# Patient Record
Sex: FEMALE | Race: WHITE | Employment: FULL TIME | URBAN - METROPOLITAN AREA
[De-identification: names, ages, dates, MRNs, and addresses within clinical notes are randomized per-mention and may not be internally consistent; named-entity substitution may affect disease eponyms.]

---

## 2017-11-01 ENCOUNTER — TRANSFERRED RECORDS (OUTPATIENT)
Dept: HEALTH INFORMATION MANAGEMENT | Facility: CLINIC | Age: 32
End: 2017-11-01

## 2017-11-01 LAB
CHOLEST SERPL-MCNC: 150 MG/DL (ref 0–199)
GLUCOSE SERPL-MCNC: 88 MG/DL (ref 70–100)
HDLC SERPL-MCNC: 63 MG/DL
HPV ABSTRACT: NORMAL
LDLC SERPL CALC-MCNC: 77 MG/DL (ref 0–129)
PAP-ABSTRACT: NORMAL
TRIGL SERPL-MCNC: 50 MG/DL (ref 0–149)
TSH SERPL-ACNC: 4.61 UIU/ML (ref 0.3–4.5)

## 2018-07-17 ENCOUNTER — OFFICE VISIT (OUTPATIENT)
Dept: FAMILY MEDICINE | Facility: CLINIC | Age: 33
End: 2018-07-17
Payer: COMMERCIAL

## 2018-07-17 VITALS
TEMPERATURE: 98.5 F | DIASTOLIC BLOOD PRESSURE: 75 MMHG | RESPIRATION RATE: 20 BRPM | BODY MASS INDEX: 21.07 KG/M2 | WEIGHT: 123.4 LBS | HEART RATE: 69 BPM | SYSTOLIC BLOOD PRESSURE: 133 MMHG | OXYGEN SATURATION: 97 % | HEIGHT: 64 IN

## 2018-07-17 DIAGNOSIS — Z30.430 ENCOUNTER FOR INSERTION OF INTRAUTERINE CONTRACEPTIVE DEVICE (IUD): Primary | ICD-10-CM

## 2018-07-17 DIAGNOSIS — Z30.46 NEXPLANON REMOVAL: ICD-10-CM

## 2018-07-17 DIAGNOSIS — Z00.00 ENCOUNTER FOR ROUTINE ADULT HEALTH EXAMINATION WITHOUT ABNORMAL FINDINGS: ICD-10-CM

## 2018-07-17 DIAGNOSIS — Z97.5 IUD (INTRAUTERINE DEVICE) IN PLACE: ICD-10-CM

## 2018-07-17 PROCEDURE — 11982 REMOVE DRUG IMPLANT DEVICE: CPT | Performed by: NURSE PRACTITIONER

## 2018-07-17 PROCEDURE — 99385 PREV VISIT NEW AGE 18-39: CPT | Mod: 25 | Performed by: NURSE PRACTITIONER

## 2018-07-17 PROCEDURE — 58300 INSERT INTRAUTERINE DEVICE: CPT | Performed by: NURSE PRACTITIONER

## 2018-07-17 RX ORDER — COPPER 313.4 MG/1
1 INTRAUTERINE DEVICE INTRAUTERINE ONCE
Qty: 1 EACH
Start: 2018-07-17 | End: 2018-08-14

## 2018-07-17 ASSESSMENT — PAIN SCALES - GENERAL: PAINLEVEL: NO PAIN (0)

## 2018-07-17 NOTE — MR AVS SNAPSHOT
After Visit Summary   7/17/2018    Marjorie Lee    MRN: [id number]           Patient Information     Date Of Birth          1985        Visit Information        Provider Department      7/17/2018 9:00 AM La Scott APRN CNP [facility]        Today's Diagnoses     Encounter for insertion of intrauterine contraceptive device (IUD)    -  1    Nexplanon removal        IUD (intrauterine device) in place          Care Instructions    We placed an IUD (intrauterine device) today for birth control.  You may experience some cramping like a period for the next few days and spotting for the next month or so.    After that, if you have the Copper IUD, your period pattern should normalize though it may be heavier and the cramping stronger than normal.  This is normal and you can use ibuprofen over the counter to help with that.      I recommend using condoms for the next seven days to protect against pregnancy and always to protect against sexually transmitted infections.      The risk for expelling the IUD is highest in the first month, therefore, come back in 4 weeks for a string check.  You should also check for the strings regularly yourself.    Congratulations on choosing such an effective birth control method!  Please contact us at any time with questions or concerns!  ______  We removed the Nexplanon today.  -Remove the gauze dressing tomorrow, you can shower normally at that time.  -The little white bandaids will fall off after about one week.  This is fine.  -There should be minimal scarring; if you have any redness, tenderness, or drainage from the site, call right away.    At [facility], we strive to deliver an exceptional experience to you, every time we see you.  If you receive a survey in the mail, please send us back your thoughts. We really do value your feedback.    Based on your medical history, these are the current health  maintenance/preventive care services that you are due for (some may have been done at this visit.)  Health Maintenance Due   Topic Date Due     PHQ-2 Q1 YR  02/17/1995     TETANUS IMMUNIZATION (SYSTEM ASSIGNED)  02/17/2001     HIV SCREEN (SYSTEM ASSIGNED)  02/17/2001     PAP SCREENING Q3 YR (SYSTEM ASSIGNED)  02/17/2004       Suggested websites for health information:  Www.[employer].org : Up to date and easily searchable information on multiple topics.  Www.medlineplus.gov : medication info, interactive tutorials, watch real surgeries online  Www.familydoctor.org : good info from the Academy of Family Physicians  Www.cdc.gov : public health info, travel advisories, epidemics (H1N1)  Www.aap.org : children's health info, normal development, vaccinations  Www.health.[facility].[place].us : [place] dept of health, public health issues in [place], N1N1    Your care team:                            Family Medicine Internal Medicine   MD Michael Sanders MD Shantel Branch-Fleming, MD Katya Georgiev PA-C Megan Hill, APRN CNP    El Baugh MD Pediatrics   Raul Piedra, LEN Scott, CNP MD Kaitlynn Ibarra APRN CNP   MD Nilda Mock MD Deborah Mielke, MD Kim Thein, APRN [facility]      Clinic hours: Monday - Thursday 7 am-7 pm; Fridays 7 am-5 pm.   Urgent care: Monday - Friday 11 am-9 pm; Saturday and Sunday 9 am-5 pm.  Pharmacy : Monday -Thursday 8 am-8 pm; Friday 8 am-6 pm; Saturday and Sunday 9 am-5 pm.     Clinic: [phone]   Pharmacy: [phone]        Preventive Health Recommendations  Female Ages 26 - 39  Yearly exam:   See your health care provider every year in order to    Review health changes.     Discuss preventive care.      Review your medicines if you your doctor has prescribed any.    Until age 30: Get a Pap test every three years (more often if you have had an abnormal result).    After age 30: Talk to your doctor about whether you should have a Pap test every 3  years or have a Pap test with HPV screening every 5 years.   You do not need a Pap test if your uterus was removed (hysterectomy) and you have not had cancer.  You should be tested each year for STDs (sexually transmitted diseases), if you're at risk.   Talk to your provider about how often to have your cholesterol checked.  If you are at risk for diabetes, you should have a diabetes test (fasting glucose).  Shots: Get a flu shot each year. Get a tetanus shot every 10 years.   Nutrition:     Eat at least 5 servings of fruits and vegetables each day.    Eat whole-grain bread, whole-wheat pasta and brown rice instead of white grains and rice.    Get adequate Calcium and Vitamin D.     Lifestyle    Exercise at least 150 minutes a week (30 minutes a day, 5 days of the week). This will help you control your weight and prevent disease.    Limit alcohol to one drink per day.    No smoking.     Wear sunscreen to prevent skin cancer.    See your dentist every six months for an exam and cleaning.            Follow-ups after your visit        Follow-up notes from your care team     Return in about 4 weeks (around 8/14/2018) for string check.      Who to contact     If you have questions or need follow up information about today's clinic visit or your schedule please contact [facility] directly at [phone].  Normal or non-critical lab and imaging results will be communicated to you by MyChart, letter or phone within 4 business days after the clinic has received the results. If you do not hear from us within 7 days, please contact the clinic through MyChart or phone. If you have a critical or abnormal lab result, we will notify you by phone as soon as possible.  Submit refill requests through [employer] or call your pharmacy and they will forward the refill request to us. Please allow 3 business days for your refill to be completed.          Additional Information About Your Visit        [employer] Information   "   [employer] lets you send messages to your doctor, view your test results, renew your prescriptions, schedule appointments and more. To sign up, go to [email or web address] . Click on \"Log in\" on the left side of the screen, which will take you to the Welcome page. Then click on \"Sign up Now\" on the right side of the page.     You will be asked to enter the access code listed below, as well as some personal information. Please follow the directions to create your username and password.     Your access code is: [email or web address]  Expires: 9/10/2018 10:27 AM     Your access code will  in 90 days. If you need help or a new code, please call your [place] clinic or [phone].        Care EveryWhere ID     This is your Care EveryWhere ID. This could be used by other organizations to access your [place] medical records  [id number]        Your Vitals Were     Pulse Temperature Respirations Height Last Period Pulse Oximetry    69 98.5  F (36.9  C) (Oral) 20 5' 3.5\" (1.613 m) 2018 (Approximate) 97%    BMI (Body Mass Index)                   21.52 kg/m2            Blood Pressure from Last 3 Encounters:   18 133/75    Weight from Last 3 Encounters:   18 123 lb 6.4 oz (56 kg)              We Performed the Following     Insertion of an IUD     ParaGard     REMOVAL NON-BIODEGRADABLE DRUG DELIVERY IMPLANT          Today's Medication Changes          These changes are accurate as of 18  9:34 AM.  If you have any questions, ask your nurse or doctor.               Start taking these medicines.        Dose/Directions    paragard intrauterine copper   Used for:  IUD (intrauterine device) in place, Encounter for insertion of intrauterine contraceptive device (IUD)   Started by:  La Scott APRN CNP        Dose:  1 each   1 each by Intrauterine route once for 1 dose   Quantity:  1 each   Refills:  0            Where to get your medicines      Some of these will need a paper prescription and " others can be bought over the counter.  Ask your nurse if you have questions.     You don't need a prescription for these medications     paragard intrauterine copper                Primary Care Provider Fax #    Provider Not In System [phone]                Equal Access to Services     AL LOJA : Hadii aad ku hadelaine Soerasmo, waaxda luqadaha, qaybta kaalmada aderezayada, darrel fraserfabiano johnstonreza funes quinton whalen. So [facility] [place] [phone].    ATENCIÓN: Si habla español, tiene a bean disposición servicios gratuitos de asistencia lingüística. Llame al [phone].    We comply with applicable federal civil rights laws and [place] laws. We do not discriminate on the basis of race, color, national origin, age, disability, sex, sexual orientation, or gender identity.            Thank you!     Thank you for choosing [facility]  for your care. Our goal is always to provide you with excellent care. Hearing back from our patients is one way we can continue to improve our services. Please take a few minutes to complete the written survey that you may receive in the mail after your visit with us. Thank you!             Your Updated Medication List - Protect others around you: Learn how to safely use, store and throw away your medicines at www.disposemymeds.org.          This list is accurate as of 7/17/18  9:34 AM.  Always use your most recent med list.                   Brand Name Dispense Instructions for use Diagnosis    etonogestrel 68 MG Impl    IMPLANON/NEXPLANON     Inject 68 mg Subcutaneous        paragard intrauterine copper     1 each    1 each by Intrauterine route once for 1 dose    IUD (intrauterine device) in place, Encounter for insertion of intrauterine contraceptive device (IUD)

## 2018-07-17 NOTE — PROGRESS NOTES
SUBJECTIVE:   CC: Marjorie Lee is an 33 year old woman who presents for preventive health visit.     Healthy Habits:    Do you get at least three servings of calcium containing foods daily (dairy, green leafy vegetables, etc.)? yes    Amount of exercise or daily activities, outside of work: over an hour(s) per day    Problems taking medications regularly not applicable    Medication side effects: No    Have you had an eye exam in the past two years? no    Do you see a dentist twice per year? yes    Do you have sleep apnea, excessive snoring or daytime drowsiness?no  Pap smear done on this date: 11/1/2017 (approximately), by this group: [facility], results were NIL, HPV neg.   Normal blood work 11/2017.      Today's PHQ-2 Score:   PHQ-2 ( 1999 Pfizer) 7/17/2018   Q1: Little interest or pleasure in doing things 0   Q2: Feeling down, depressed or hopeless 0   PHQ-2 Score 0       Abuse: Current or Past(Physical, Sexual or Emotional)- No  Do you feel safe in your environment - Yes    Social History   Substance Use Topics     Smoking status: Not on file     Smokeless tobacco: Not on file     Alcohol use Not on file     If you drink alcohol do you typically have >3 drinks per day or >7 drinks per week? No                     Reviewed orders with patient.  Reviewed health maintenance and updated orders accordingly - Yes  BP Readings from Last 3 Encounters:   07/17/18 133/75    Wt Readings from Last 3 Encounters:   07/17/18 123 lb 6.4 oz (56 kg)                  Patient Active Problem List   Diagnosis     IUD (intrauterine device) in place     History reviewed. No pertinent surgical history.    Social History   Substance Use Topics     Smoking status: Never Smoker     Smokeless tobacco: Never Used     Alcohol use Yes      Comment: occ     History reviewed. No pertinent family history.      Current Outpatient Prescriptions   Medication Sig Dispense Refill     etonogestrel (IMPLANON/NEXPLANON) 68 MG IMPL Inject 68 mg  Subcutaneous       paragard intrauterine copper 1 each by Intrauterine route once for 1 dose 1 each      No Known Allergies  No lab results found.     Mammogram not appropriate for this patient based on age.    Pertinent mammograms are reviewed under the imaging tab.  History of abnormal Pap smear: NO - age 30-65 PAP every 5 years with negative HPV co-testing recommended     Reviewed and updated as needed this visit by clinical staff  Tobacco  Allergies  Meds  Problems  Med Hx  Surg Hx  Fam Hx  Soc Hx          Reviewed and updated as needed this visit by Provider  Tobacco  Allergies  Meds  Problems  Med Hx  Surg Hx  Fam Hx  Soc Hx         Past Medical History:   Diagnosis Date     NO ACTIVE PROBLEMS       History reviewed. No pertinent surgical history.  Obstetric History       T0      L2     SAB0   TAB0   Ectopic0   Multiple0   Live Births2       # Outcome Date GA Lbr Hardy/2nd Weight Sex Delivery Anes PTL Lv   2      F    MALATHI   1  2010        MALATHI          ROS:  CONSTITUTIONAL: NEGATIVE for fever, chills, change in weight  INTEGUMENTARU/SKIN: NEGATIVE for worrisome rashes, moles or lesions  EYES: NEGATIVE for vision changes or irritation  ENT: NEGATIVE for ear, mouth and throat problems  RESP: NEGATIVE for significant cough or SOB  BREAST: NEGATIVE for masses, tenderness or discharge  CV: NEGATIVE for chest pain, palpitations or peripheral edema  GI: NEGATIVE for nausea, abdominal pain, heartburn, or change in bowel habits  : NEGATIVE for unusual urinary or vaginal symptoms. Periods are regular.  MUSCULOSKELETAL: NEGATIVE for significant arthralgias or myalgia  NEURO: NEGATIVE for weakness, dizziness or paresthesias  ENDOCRINE: NEGATIVE for temperature intolerance, skin/hair changes  HEME/ALLERGY/IMMUNE: NEGATIVE for bleeding problems  PSYCHIATRIC: NEGATIVE for changes in mood or affect    OBJECTIVE:   /75 (BP Location: Left arm, Patient Position: Chair,  "Cuff Size: Adult Regular)  Pulse 69  Temp 98.5  F (36.9  C) (Oral)  Resp 20  Ht 5' 3.5\" (1.613 m)  Wt 123 lb 6.4 oz (56 kg)  LMP 07/03/2018 (Approximate)  SpO2 97%  BMI 21.52 kg/m2  EXAM:  GENERAL: healthy, alert and no distress  EYES: Eyes grossly normal to inspection, PERRL and conjunctivae and sclerae normal  ABDOMEN: soft, nontender, no hepatosplenomegaly, no masses and bowel sounds normal   (female): normal female external genitalia, normal urethral meatus, vaginal mucosa pink, moist, well rugated, and normal cervix/adnexa/uterus without masses or discharge  MS: no gross musculoskeletal defects noted, no edema  SKIN: no suspicious lesions or rashes  NEURO: Normal strength and tone, mentation intact and speech normal  PSYCH: mentation appears normal, affect normal/bright    Diagnostic Test Results:  none     ASSESSMENT/PLAN:   1. Encounter for routine adult health examination without abnormal findings    2. Encounter for insertion of intrauterine contraceptive device (IUD)  See procedure note.  - Insertion of an IUD  - ParaGard  - paragard intrauterine copper; 1 each by Intrauterine route once for 1 dose  Dispense: 1 each    3. Nexplanon removal  See procedure note.  - REMOVAL NON-BIODEGRADABLE DRUG DELIVERY IMPLANT    4. IUD (intrauterine device) in place  - paragard intrauterine copper; 1 each by Intrauterine route once for 1 dose  Dispense: 1 each    COUNSELING:   Reviewed preventive health counseling, as reflected in patient instructions       Regular exercise       Healthy diet/nutrition       Contraception       Family planning    BP Readings from Last 1 Encounters:   No data found for BP     Estimated body mass index is 21.52 kg/(m^2) as calculated from the following:    Height as of this encounter: 5' 3.5\" (1.613 m).    Weight as of this encounter: 123 lb 6.4 oz (56 kg).       reports that she has never smoked. She has never used smokeless tobacco.      Counseling Resources:  ATP IV " Guidelines  Pooled Cohorts Equation Calculator  Breast Cancer Risk Calculator  FRAX Risk Assessment  ICSI Preventive Guidelines  Dietary Guidelines for Americans, 2010  USDA's MyPlate  ASA Prophylaxis  Lung CA Screening    Patient Instructions     We placed an IUD (intrauterine device) today for birth control.  You may experience some cramping like a period for the next few days and spotting for the next month or so.    After that, if you have the Copper IUD, your period pattern should normalize though it may be heavier and the cramping stronger than normal.  This is normal and you can use ibuprofen over the counter to help with that.      I recommend using condoms for the next seven days to protect against pregnancy and always to protect against sexually transmitted infections.      The risk for expelling the IUD is highest in the first month, therefore, come back in 4 weeks for a string check.  You should also check for the strings regularly yourself.    Congratulations on choosing such an effective birth control method!  Please contact us at any time with questions or concerns!  ______  We removed the Nexplanon today.  -Remove the gauze dressing tomorrow, you can shower normally at that time.  -The little white bandaids will fall off after about one week.  This is fine.  -There should be minimal scarring; if you have any redness, tenderness, or drainage from the site, call right away.    At [facility], we strive to deliver an exceptional experience to you, every time we see you.  If you receive a survey in the mail, please send us back your thoughts. We really do value your feedback.    Based on your medical history, these are the current health maintenance/preventive care services that you are due for (some may have been done at this visit.)  Health Maintenance Due   Topic Date Due     PHQ-2 Q1 YR  02/17/1995     TETANUS IMMUNIZATION (SYSTEM ASSIGNED)  02/17/2001     HIV SCREEN (SYSTEM  ASSIGNED)  02/17/2001     PAP SCREENING Q3 YR (SYSTEM ASSIGNED)  02/17/2004       Suggested websites for health information:  Www.[employer].org : Up to date and easily searchable information on multiple topics.  Www.medlineplus.gov : medication info, interactive tutorials, watch real surgeries online  Www.familydoctor.org : good info from the Academy of Family Physicians  Www.cdc.gov : public health info, travel advisories, epidemics (H1N1)  Www.aap.org : children's health info, normal development, vaccinations  Www.health.[facility].[place].us : [place] dept of health, public health issues in [place], N1N1    Your care team:                            Family Medicine Internal Medicine   MD Michael Sanders MD Shantel Branch-Fleming, MD Katya Georgiev PA-C Megan Hill, APRN MIGDALIA Baugh MD Pediatrics   Raul Piedra, LEN Scott, MD Kaitlynn Rosa APRN CNP   MD Nilda Mock MD Deborah Mielke, MD Kim Thein, APRN [facility]      Clinic hours: Monday - Thursday 7 am-7 pm; Fridays 7 am-5 pm.   Urgent care: Monday - Friday 11 am-9 pm; Saturday and Sunday 9 am-5 pm.  Pharmacy : Monday -Thursday 8 am-8 pm; Friday 8 am-6 pm; Saturday and Sunday 9 am-5 pm.     Clinic: [phone]   Pharmacy: [phone]        Preventive Health Recommendations  Female Ages 26 - 39  Yearly exam:   See your health care provider every year in order to    Review health changes.     Discuss preventive care.      Review your medicines if you your doctor has prescribed any.    Until age 30: Get a Pap test every three years (more often if you have had an abnormal result).    After age 30: Talk to your doctor about whether you should have a Pap test every 3 years or have a Pap test with HPV screening every 5 years.   You do not need a Pap test if your uterus was removed (hysterectomy) and you have not had cancer.  You should be tested each year for STDs (sexually transmitted diseases), if you're at  risk.   Talk to your provider about how often to have your cholesterol checked.  If you are at risk for diabetes, you should have a diabetes test (fasting glucose).  Shots: Get a flu shot each year. Get a tetanus shot every 10 years.   Nutrition:     Eat at least 5 servings of fruits and vegetables each day.    Eat whole-grain bread, whole-wheat pasta and brown rice instead of white grains and rice.    Get adequate Calcium and Vitamin D.     Lifestyle    Exercise at least 150 minutes a week (30 minutes a day, 5 days of the week). This will help you control your weight and prevent disease.    Limit alcohol to one drink per day.    No smoking.     Wear sunscreen to prevent skin cancer.    See your dentist every six months for an exam and cleaning.        MARCY Carlin [facility]

## 2018-07-17 NOTE — PROCEDURES
The patient meets and is agreeable to the following conditions:  She is parous.She is not interested in conception in the near future.She currently is in a stable, monogamous relationship.There is no previous history of pelvic inflammatory disease.  There is no previous history of ectopic pregnancy.  She is willing to check monthly for the IUD string.  She is at least 8 weeks post-partum.  There is no history of unresolved abnormal uterine bleeding.  There is no history of an unresolved abnormal PAP smear.  She has no history of Colten's disease or an allergy to copper (for ParaGard).  She has no history of diabetes, AIDS, leukemia, IV drug use or chronic steroid use.  She is willing to return annually for pelvic exams  She denies the possibility of pregnancy.  Pregnancy test today was not done as we are switching from Nexplanon  The following risks were discussed with the patient:  Possibility of pregnancy and ectopic pregnancy.  Possibility of pelvic inflammatory disease, particularly with new partners.  Risk of uterine perforation or IUD expulsion.  Possibility of difficult removal.  Spotting or heavy bleeding.  Cramping, pain or infection during or after insertion.    The patient was given patient information on the IUD and the patient education brochure from the .  The patient has given consent to proceed with placement of the IUD.  A written consent form is present in the chart.  She wishes to proceed.    PROCEDURE:  IUD Placement    Type of IUD: ParaGard    The patient was placed in a dorsal lithotomy potion and a bimanual exam was performed to determine the position of the uterus.  The speculum was placed, cervix was identified and cleaned with betadine three times.  A single tooth tenaculum was applied to the anterior lip of the cervix for stabilization.  The uterus was sounded to 7.0 cm and removed. (Target sound depth is 6.5 cm to 8.5 cm.)   The IUD insertion device was inserted without  difficulty into the uterus to manufacturers recommendationsunder sterile conditions to the sounding depth. The IUD string was then cut to 2.0 cm.    The patient tolerated this procedure without immediate complication and minimal bleeding.  The patient is to return or call immediately for any unexplained fever, abdominal or pelvic pain, excessive bleeding, possibility of pregnancy, foul-smelling discharge, sense that the IUD has been expelled.    IUD DEVICE:  LOT # [id number]  EXP Date 10/2023  ________    Removal of Nexplanon.    Informed consent reviewed and obtained.  Patient understands immediate return to fertility after removal and plans to use Paragard method of birth control.  The patient is positioned with her left arm flexed at the elbow.  Previous nexplanon insertion site identified. This area is cleansed with betadine and then injected with 3 cc of 1% lidocaine with epinephrine.  A small stab incision is made at this previous site.  The nexplanon is guided into the incision and grasped with curved clamp and removed.  It was verified to be intact.  Showed device to patient.  The incision is noted to be hemostatic and the area is wrapped with a 4x4 and Coban.  Aftercare instructions provided.    There were no complications and minimal blood loss.  Patient tolerated procedure well.      MARCY Carlin CNP

## 2018-07-17 NOTE — Clinical Note
Pap smear done on this date: 11/1/2017 (approximately), by this group: [facility], results were NIL, HPV neg.

## 2018-07-17 NOTE — PATIENT INSTRUCTIONS
We placed an IUD (intrauterine device) today for birth control.  You may experience some cramping like a period for the next few days and spotting for the next month or so.    After that, if you have the Copper IUD, your period pattern should normalize though it may be heavier and the cramping stronger than normal.  This is normal and you can use ibuprofen over the counter to help with that.      I recommend using condoms for the next seven days to protect against pregnancy and always to protect against sexually transmitted infections.      The risk for expelling the IUD is highest in the first month, therefore, come back in 4 weeks for a string check.  You should also check for the strings regularly yourself.    Congratulations on choosing such an effective birth control method!  Please contact us at any time with questions or concerns!  ______  We removed the Nexplanon today.  -Remove the gauze dressing tomorrow, you can shower normally at that time.  -The little white bandaids will fall off after about one week.  This is fine.  -There should be minimal scarring; if you have any redness, tenderness, or drainage from the site, call right away.    At [facility], we strive to deliver an exceptional experience to you, every time we see you.  If you receive a survey in the mail, please send us back your thoughts. We really do value your feedback.    Based on your medical history, these are the current health maintenance/preventive care services that you are due for (some may have been done at this visit.)  Health Maintenance Due   Topic Date Due     PHQ-2 Q1 YR  02/17/1995     TETANUS IMMUNIZATION (SYSTEM ASSIGNED)  02/17/2001     HIV SCREEN (SYSTEM ASSIGNED)  02/17/2001     PAP SCREENING Q3 YR (SYSTEM ASSIGNED)  02/17/2004       Suggested websites for health information:  Www.Count includes the [facility] Thermoset.org : Up to date and easily searchable information on multiple topics.  Www.medlineplus.gov : medication info,  interactive tutorials, watch real surgeries online  Www.familydoctor.org : good info from the Academy of Family Physicians  Www.cdc.gov : public health info, travel advisories, epidemics (H1N1)  Www.aap.org : children's health info, normal development, vaccinations  Www.health[place]state[place] : [place] dept of health, public health issues in [place], N1N1    Your care team:                            Family Medicine Internal Medicine   MD Michael Sanders MD Shantel Branch-Fleming, MD Katya Georgiev PA-C Megan Hill, APRN MIGDALIA Baugh MD Pediatrics   LEN Hicks, MD Kaitlynn Rosa APRN CNP   MD Nilda Mock MD Deborah Mielke, MD Kim Thein, APRN [facility]      Clinic hours: Monday - Thursday 7 am-7 pm; Fridays 7 am-5 pm.   Urgent care: Monday - Friday 11 am-9 pm; Saturday and Sunday 9 am-5 pm.  Pharmacy : Monday -Thursday 8 am-8 pm; Friday 8 am-6 pm; Saturday and Sunday 9 am-5 pm.     Clinic: [phone]   Pharmacy: [phone]        Preventive Health Recommendations  Female Ages 26 - 39  Yearly exam:   See your health care provider every year in order to    Review health changes.     Discuss preventive care.      Review your medicines if you your doctor has prescribed any.    Until age 30: Get a Pap test every three years (more often if you have had an abnormal result).    After age 30: Talk to your doctor about whether you should have a Pap test every 3 years or have a Pap test with HPV screening every 5 years.   You do not need a Pap test if your uterus was removed (hysterectomy) and you have not had cancer.  You should be tested each year for STDs (sexually transmitted diseases), if you're at risk.   Talk to your provider about how often to have your cholesterol checked.  If you are at risk for diabetes, you should have a diabetes test (fasting glucose).  Shots: Get a flu shot each year. Get a tetanus shot every 10 years.   Nutrition:      Eat at least 5 servings of fruits and vegetables each day.    Eat whole-grain bread, whole-wheat pasta and brown rice instead of white grains and rice.    Get adequate Calcium and Vitamin D.     Lifestyle    Exercise at least 150 minutes a week (30 minutes a day, 5 days of the week). This will help you control your weight and prevent disease.    Limit alcohol to one drink per day.    No smoking.     Wear sunscreen to prevent skin cancer.    See your dentist every six months for an exam and cleaning.

## 2018-08-14 ENCOUNTER — OFFICE VISIT (OUTPATIENT)
Dept: FAMILY MEDICINE | Facility: CLINIC | Age: 33
End: 2018-08-14
Payer: COMMERCIAL

## 2018-08-14 VITALS
HEART RATE: 68 BPM | OXYGEN SATURATION: 97 % | SYSTOLIC BLOOD PRESSURE: 133 MMHG | WEIGHT: 122.8 LBS | TEMPERATURE: 98 F | BODY MASS INDEX: 21.41 KG/M2 | DIASTOLIC BLOOD PRESSURE: 80 MMHG | RESPIRATION RATE: 16 BRPM

## 2018-08-14 DIAGNOSIS — Z97.5 IUD (INTRAUTERINE DEVICE) IN PLACE: ICD-10-CM

## 2018-08-14 DIAGNOSIS — Z30.431 ENCOUNTER FOR ROUTINE CHECKING OF INTRAUTERINE CONTRACEPTIVE DEVICE (IUD): Primary | ICD-10-CM

## 2018-08-14 PROCEDURE — 99213 OFFICE O/P EST LOW 20 MIN: CPT | Performed by: NURSE PRACTITIONER

## 2018-08-14 RX ORDER — COPPER 313.4 MG/1
1 INTRAUTERINE DEVICE INTRAUTERINE ONCE
Qty: 1 EACH
Start: 2018-08-14 | End: 2018-08-14

## 2018-08-14 RX ORDER — COPPER 313.4 MG/1
1 INTRAUTERINE DEVICE INTRAUTERINE ONCE
Qty: 1 EACH
Start: 2018-08-14 | End: 2020-02-20

## 2018-08-14 ASSESSMENT — PAIN SCALES - GENERAL: PAINLEVEL: NO PAIN (0)

## 2018-08-14 NOTE — PATIENT INSTRUCTIONS
At [facility], we strive to deliver an exceptional experience to you, every time we see you.  If you receive a survey in the mail, please send us back your thoughts. We really do value your feedback.    Based on your medical history, these are the current health maintenance/preventive care services that you are due for (some may have been done at this visit.)  Health Maintenance Due   Topic Date Due     TETANUS IMMUNIZATION (SYSTEM ASSIGNED)  02/17/2001     HIV SCREEN (SYSTEM ASSIGNED)  02/17/2001       Suggested websites for health information:  Www.[employer].org : Up to date and easily searchable information on multiple topics.  Www.medlineplus.gov : medication info, interactive tutorials, watch real surgeries online  Www.familydoctor.org : good info from the Academy of Family Physicians  Www.cdc.gov : public health info, travel advisories, epidemics (H1N1)  Www.aap.org : children's health info, normal development, vaccinations  Www.health.[facility].[place].us : [place] dept of health, public health issues in [place], N1N1    Your care team:                            Family Medicine Internal Medicine   MD Michael Sanders MD Shantel Branch-Fleming, MD Katya Georgiev PA-C Megan Hill, APRN CNP    El Baugh MD Pediatrics   Raul Piedra, PAPRICILLA Scott, MD Kaitlynn Rosa APRN CNP   MD Nilda Mock MD Deborah Mielke, MD Kim Thein, APRN [facility]      Clinic hours: Monday - Thursday 7 am-7 pm; Fridays 7 am-5 pm.   Urgent care: Monday - Friday 11 am-9 pm; Saturday and Sunday 9 am-5 pm.  Pharmacy : Monday -Thursday 8 am-8 pm; Friday 8 am-6 pm; Saturday and Sunday 9 am-5 pm.     Clinic: [phone]   Pharmacy: [phone]

## 2018-08-14 NOTE — MR AVS SNAPSHOT
After Visit Summary   8/14/2018    Marjorie Lee    MRN: [id number]           Patient Information     Date Of Birth          1985        Visit Information        Provider Department      8/14/2018 8:00 AM La Scott APRN CNP [facility]        Today's Diagnoses     Encounter for routine checking of intrauterine contraceptive device (IUD)    -  1    IUD (intrauterine device) in place          Care Instructions    At [facility], we strive to deliver an exceptional experience to you, every time we see you.  If you receive a survey in the mail, please send us back your thoughts. We really do value your feedback.    Based on your medical history, these are the current health maintenance/preventive care services that you are due for (some may have been done at this visit.)  Health Maintenance Due   Topic Date Due     TETANUS IMMUNIZATION (SYSTEM ASSIGNED)  02/17/2001     HIV SCREEN (SYSTEM ASSIGNED)  02/17/2001       Suggested websites for health information:  Www.[employer] : Up to date and easily searchable information on multiple topics.  Www.medlineplus.gov : medication info, interactive tutorials, watch real surgeries online  Www.familydoctor.org : good info from the Academy of Family Physicians  Www.cdc.gov : public health info, travel advisories, epidemics (H1N1)  Www.aap.org : children's health info, normal development, vaccinations  Www.health.state.[place] : [place] dept of health, public health issues in [place], N1N1    Your care team:                            Family Medicine Internal Medicine   MD Michael Sanders MD Shantel Branch-Fleming, MD Katya Georgiev PA-C Megan Hill, APRN CNP Nam Ho, MD Pediatrics   LEN Hicks CNP Paula Brito, MD Amelia Massimini APRN CNP Shaista Malik, MD Bethany Templen, MD Deborah Mielke, MD Kim Thein, APRN CNP      Clinic hours: Monday - Thursday 7 am-7 pm;  Fridays 7 am-5 pm.   Urgent care: Monday - Friday 11 am-9 pm; Saturday and Sunday 9 am-5 pm.  Pharmacy : Monday -Thursday 8 am-8 pm; Friday 8 am-6 pm; Saturday and Sunday 9 am-5 pm.     Clinic: [phone]   Pharmacy: [phone]              Follow-ups after your visit        Follow-up notes from your care team     Return in about 1 year (around 8/14/2019) for Physical Exam.      Who to contact     If you have questions or need follow up information about today's clinic visit or your schedule please contact [facility] directly at [phone].  Normal or non-critical lab and imaging results will be communicated to you by MyChart, letter or phone within 4 business days after the clinic has received the results. If you do not hear from us within 7 days, please contact the clinic through YouLikehart or phone. If you have a critical or abnormal lab result, we will notify you by phone as soon as possible.  Submit refill requests through [employer]cha Ninjas or call your pharmacy and they will forward the refill request to us. Please allow 3 business days for your refill to be completed.          Additional Information About Your Visit        YouLikeharFleecs Information     [employer] gives you secure access to your electronic health record. If you see a primary care provider, you can also send messages to your care team and make appointments. If you have questions, please call your primary care clinic.  If you do not have a primary care provider, please call [phone] and they will assist you.        Care EveryWhere ID     This is your Care EveryWhere ID. This could be used by other organizations to access your [facility] medical records  [id number]        Your Vitals Were     Pulse Temperature Respirations Last Period Pulse Oximetry BMI (Body Mass Index)    68 98  F (36.7  C) (Oral) 16 07/31/2018 (Approximate) 97% 21.41 kg/m2       Blood Pressure from Last 3 Encounters:   08/14/18 133/80   07/17/18 133/75    Weight  from Last 3 Encounters:   08/14/18 122 lb 12.8 oz (55.7 kg)   07/17/18 123 lb 6.4 oz (56 kg)              Today, you had the following     No orders found for display         Today's Medication Changes          These changes are accurate as of 8/14/18  8:21 AM.  If you have any questions, ask your nurse or doctor.               Start taking these medicines.        Dose/Directions    paragard intrauterine copper   Used for:  IUD (intrauterine device) in place   Started by:  La Scott APRN CNP        Dose:  1 each   1 each by Intrauterine route once for 1 dose   Quantity:  1 each   Refills:  0         Stop taking these medicines if you haven't already. Please contact your care team if you have questions.     etonogestrel 68 MG Impl   Commonly known as:  IMPLANON/NEXPLANON   Stopped by:  La Scott APRN CNP                Where to get your medicines      Some of these will need a paper prescription and others can be bought over the counter.  Ask your nurse if you have questions.     You don't need a prescription for these medications     paragard intrauterine copper                Primary Care Provider Office Phone # Fax #    M[facility]Y [facility] [phone]  [facility] [address]        Equal Access to Services     JOES LOJA : Hadii edu ku hadasho Soomaali, waaxda luqadaha, qaybta kaalmada adeegyada, waxay idiin hayramón whalen. So [facility] [phone].    ATENCIÓN: Si habla español, tiene a bean disposición servicios gratuitos de asistencia lingüística. Llame al [phone].    We comply with applicable federal civil rights laws and [place] laws. We do not discriminate on the basis of race, color, national origin, age, disability, sex, sexual orientation, or gender identity.            Thank you!     Thank you for choosing [facility]  for your care. Our goal is always to provide you with excellent  care. Hearing back from our patients is one way we can continue to improve our services. Please take a few minutes to complete the written survey that you may receive in the mail after your visit with us. Thank you!             Your Updated Medication List - Protect others around you: Learn how to safely use, store and throw away your medicines at www.disposemymeds.org.          This list is accurate as of 8/14/18  8:21 AM.  Always use your most recent med list.                   Brand Name Dispense Instructions for use Diagnosis    paragard intrauterine copper     1 each    1 each by Intrauterine route once for 1 dose    IUD (intrauterine device) in place

## 2018-08-14 NOTE — PROGRESS NOTES
SUBJECTIVE:   Marjorie Lee is a 33 year old female who presents to clinic today for the following health issues:    Concern: 1 month IUD string check.  Patient had some bleeding initially after nexplanon removal and IUD placement then stopped.  No longer bleeding now.  Denies pelvic pain.  She can feel the strings herself.      Problem list and histories reviewed & adjusted, as indicated.  Additional history: as documented    Patient Active Problem List   Diagnosis     IUD (intrauterine device) in place     History reviewed. No pertinent surgical history.    Social History   Substance Use Topics     Smoking status: Never Smoker     Smokeless tobacco: Never Used     Alcohol use Yes      Comment: occ     Family History   Problem Relation Age of Onset     Osteoarthritis Mother      Osteoarthritis Father      Breast Cancer Maternal Aunt      Other - See Comments Paternal Uncle      glioblastoma     Other - See Comments Paternal Uncle      glioblastoma         Current Outpatient Prescriptions   Medication Sig Dispense Refill     paragard intrauterine copper 1 each by Intrauterine route once for 1 dose 1 each      [DISCONTINUED] paragard intrauterine copper 1 each by Intrauterine route once for 1 dose 1 each      [DISCONTINUED] paragard intrauterine copper 1 each by Intrauterine route once for 1 dose 1 each      No Known Allergies    Reviewed and updated as needed this visit by clinical staff  Tobacco  Allergies  Meds  Med Hx  Surg Hx  Fam Hx  Soc Hx      Reviewed and updated as needed this visit by Provider  Tobacco  Allergies  Meds  Med Hx  Surg Hx  Fam Hx  Soc Hx        ROS:  Constitutional, HEENT, cardiovascular, pulmonary, gi and gu systems are negative, except as otherwise noted.    OBJECTIVE:     /80 (BP Location: Left arm, Patient Position: Chair, Cuff Size: Adult Regular)  Pulse 68  Temp 98  F (36.7  C) (Oral)  Resp 16  Wt 122 lb 12.8 oz (55.7 kg)  LMP 07/31/2018 (Approximate)  SpO2  97%  BMI 21.41 kg/m2  Body mass index is 21.41 kg/(m^2).  GENERAL: healthy, alert and no distress  ABDOMEN: soft, nontender, no hepatosplenomegaly, no masses and bowel sounds normal   (female): normal female external genitalia, normal urethral meatus , vaginal mucosa pink, moist, well rugated, normal cervix, adnexae, and uterus without masses. and IUD strings visualized at os-2 cm in length    Diagnostic Test Results:  none     ASSESSMENT/PLAN:     1. Encounter for routine checking of intrauterine contraceptive device (IUD)  IIUD strings visualized at os.  Patient likes method so far.  To follow up if has complications or cannot feel strings.    2. IUD (intrauterine device) in place  - paragard intrauterine copper; 1 each by Intrauterine route once for 1 dose  Dispense: 1 each    Patient Instructions     At [facility], we strive to deliver an exceptional experience to you, every time we see you.  If you receive a survey in the mail, please send us back your thoughts. We really do value your feedback.    Based on your medical history, these are the current health maintenance/preventive care services that you are due for (some may have been done at this visit.)  Health Maintenance Due   Topic Date Due     TETANUS IMMUNIZATION (SYSTEM ASSIGNED)  02/17/2001     HIV SCREEN (SYSTEM ASSIGNED)  02/17/2001       Suggested websites for health information:  Www.Azingo.Payward : Up to date and easily searchable information on multiple topics.  Www.medlineplus.gov : medication info, interactive tutorials, watch real surgeries online  Www.familydoctor.org : good info from the Academy of Family Physicians  Www.cdc.gov : public health info, travel advisories, epidemics (H1N1)  Www.aap.org : children's health info, normal development, vaccinations  Www.health.state.[place] : [place] dept of health, public health issues in [place], N1N1    Your care team:                            Family Medicine Internal Medicine    MD Michael Sanders MD Shantel Branch-Fleming, MD Katya Georgiev PA-C Megan Hill, MARCY Baugh MD Pediatrics   LEN Hicks, MD Kaitlynn Rosa APRN CNP   MD Nilda Mock MD Deborah Mielke, MD Kiley Villela, APRN CNP      Clinic hours: Monday - Thursday 7 am-7 pm; Fridays 7 am-5 pm.   Urgent care: Monday - Friday 11 am-9 pm; Saturday and Sunday 9 am-5 pm.  Pharmacy : Monday -Thursday 8 am-8 pm; Friday 8 am-6 pm; Saturday and Sunday 9 am-5 pm.     Clinic: [phone]   Pharmacy: [phone]          La Scott, MARCY NEGRON  [facility]

## 2019-06-19 ENCOUNTER — OFFICE VISIT (OUTPATIENT)
Dept: FAMILY MEDICINE | Facility: CLINIC | Age: 34
End: 2019-06-19
Payer: COMMERCIAL

## 2019-06-19 VITALS
RESPIRATION RATE: 12 BRPM | WEIGHT: 116 LBS | TEMPERATURE: 98.4 F | HEIGHT: 64 IN | HEART RATE: 62 BPM | OXYGEN SATURATION: 98 % | DIASTOLIC BLOOD PRESSURE: 71 MMHG | BODY MASS INDEX: 19.81 KG/M2 | SYSTOLIC BLOOD PRESSURE: 115 MMHG

## 2019-06-19 DIAGNOSIS — M79.18 BUTTOCK PAIN: ICD-10-CM

## 2019-06-19 DIAGNOSIS — K92.1 BLOOD IN STOOL: Primary | ICD-10-CM

## 2019-06-19 PROCEDURE — 99214 OFFICE O/P EST MOD 30 MIN: CPT | Performed by: NURSE PRACTITIONER

## 2019-06-19 ASSESSMENT — PAIN SCALES - GENERAL: PAINLEVEL: NO PAIN (0)

## 2019-06-19 ASSESSMENT — MIFFLIN-ST. JEOR: SCORE: 1193.23

## 2019-06-19 NOTE — PROGRESS NOTES
"Subjective     Marjorie Lee is a 34 year old female who presents to clinic today for the following health issues:    HPI   Blood in stool      Duration: few months    Description (location/character/radiation): patient states blood usually appears after intense run, training for a marathon    Intensity:  moderate    Accompanying signs and symptoms: urgency    History (similar episodes/previous evaluation): none.    Precipitating or alleviating factors: taking ibuprofen before a run.    Therapies tried and outcome: None, staying hydrated     No family hx of colon issues.  Running Grandma's [place] this weekend  No abdominal pain, nausea or vomiting.    Patient Active Problem List   Diagnosis     IUD (intrauterine device) in place     History reviewed. No pertinent surgical history.    Social History     Tobacco Use     Smoking status: Never Smoker     Smokeless tobacco: Never Used   Substance Use Topics     Alcohol use: Yes     Comment: occ     Family History   Problem Relation Age of Onset     Osteoarthritis Mother      Osteoarthritis Father      Breast Cancer Maternal Aunt      Other - See Comments Paternal Uncle         glioblastoma     Other - See Comments Paternal Uncle         glioblastoma         Current Outpatient Medications   Medication Sig Dispense Refill     Multiple Vitamins-Minerals (MULTIVITAMIN PO)        paragard intrauterine copper 1 each by Intrauterine route once for 1 dose 1 each      No Known Allergies    Reviewed and updated as needed this visit by Provider  Tobacco  Allergies  Meds  Problems  Med Hx  Surg Hx  Fam Hx         Review of Systems   ROS COMP: Constitutional, HEENT, cardiovascular, pulmonary, gi and gu systems are negative, except as otherwise noted.      Objective    /71 (BP Location: Left arm, Patient Position: Chair, Cuff Size: Adult Regular)   Pulse 62   Temp 98.4  F (36.9  C) (Oral)   Resp 12   Ht 1.613 m (5' 3.5\")   Wt 52.6 kg (116 lb)   LMP  (LMP Unknown) "   SpO2 98%   BMI 20.23 kg/m    Body mass index is 20.23 kg/m .  Physical Exam   GENERAL: healthy, alert and no distress  RESP: lungs clear to auscultation - no rales, rhonchi or wheezes  CV: regular rate and rhythm, normal S1 S2, no S3 or S4, no murmur, click or rub, no peripheral edema and peripheral pulses strong  ABDOMEN: soft, nontender, no hepatosplenomegaly, no masses and bowel sounds normal  MS: no gross musculoskeletal defects noted, no edema  PSYCH: mentation appears normal, affect normal/bright    Diagnostic Test Results:  none         Assessment & Plan     1. Blood in stool  Will refer to GI for further eval and likely colonoscopy.  - GASTROENTEROLOGY ADULT REF CONSULT ONLY    2. Buttock pain  Sounds like piriformis syndrome. She has tried supportive therapies at home without relief.   - [facility] PT, HAND, AND CHIROPRACTIC REFERRAL; Future       See Patient Instructions    Return in about 1 week (around 6/26/2019).       Return precautions discussed, including when to seek urgent/emergent care.    Patient verbalizes understanding and agrees with plan of care. Patient stable for discharge.      MARCY Agrawal [facility]

## 2019-06-26 ENCOUNTER — TELEPHONE (OUTPATIENT)
Dept: GASTROENTEROLOGY | Facility: CLINIC | Age: 34
End: 2019-06-26

## 2019-06-26 NOTE — TELEPHONE ENCOUNTER
PREVISIT INFORMATION                                                    Marjorie Lee scheduled for future visit at [facility] specialty clinics.    Patient is scheduled to see Cassy Young PA-C on 7/1/2019  Reason for visit: Blood in stool  Referring provider MARCY Guzmán CNP  Has patient seen previous specialist? Unknown  Medical Records:  Unknown    REVIEW                                                      New patient packet mailed to patient: No  Medication reconciliation complete: N/A      Current Outpatient Medications   Medication Sig Dispense Refill     Multiple Vitamins-Minerals (MULTIVITAMIN PO)        paragard intrauterine copper 1 each by Intrauterine route once for 1 dose 1 each        Allergies: Patient has no known allergies.      PLAN/FOLLOW-UP NEEDED                                                      LPN left a message for patient on confirmed voicemail.     Will route to care team for follow-up.    Patient Reminders Given:  Please, make sure you bring an updated list of your medications.   If you are having a procedure, please, present 15 minutes early.  If you need to cancel or reschedule,please call [phone].    Willa Amezquita LPN

## 2019-07-01 ENCOUNTER — OFFICE VISIT (OUTPATIENT)
Dept: GASTROENTEROLOGY | Facility: CLINIC | Age: 34
End: 2019-07-01
Payer: COMMERCIAL

## 2019-07-01 ENCOUNTER — HOSPITAL ENCOUNTER (OUTPATIENT)
Facility: AMBULATORY SURGERY CENTER | Age: 34
End: 2019-07-01
Attending: INTERNAL MEDICINE
Payer: COMMERCIAL

## 2019-07-01 VITALS
OXYGEN SATURATION: 98 % | HEART RATE: 60 BPM | BODY MASS INDEX: 20.26 KG/M2 | SYSTOLIC BLOOD PRESSURE: 117 MMHG | WEIGHT: 118.7 LBS | HEIGHT: 64 IN | DIASTOLIC BLOOD PRESSURE: 79 MMHG

## 2019-07-01 DIAGNOSIS — R74.01 ELEVATED AST (SGOT): ICD-10-CM

## 2019-07-01 DIAGNOSIS — K62.89 RECTAL PAIN: ICD-10-CM

## 2019-07-01 DIAGNOSIS — R19.7 BLOODY DIARRHEA: Primary | ICD-10-CM

## 2019-07-01 LAB
ALBUMIN SERPL-MCNC: 3.8 G/DL (ref 3.4–5)
ALP SERPL-CCNC: 53 U/L (ref 40–150)
ALT SERPL W P-5'-P-CCNC: 39 U/L (ref 0–50)
ANION GAP SERPL CALCULATED.3IONS-SCNC: 5 MMOL/L (ref 3–14)
AST SERPL W P-5'-P-CCNC: 70 U/L (ref 0–45)
BASOPHILS # BLD AUTO: 0 10E9/L (ref 0–0.2)
BASOPHILS NFR BLD AUTO: 0.6 %
BILIRUB SERPL-MCNC: 0.4 MG/DL (ref 0.2–1.3)
BUN SERPL-MCNC: 13 MG/DL (ref 7–30)
CALCIUM SERPL-MCNC: 8.7 MG/DL (ref 8.5–10.1)
CHLORIDE SERPL-SCNC: 108 MMOL/L (ref 94–109)
CO2 SERPL-SCNC: 26 MMOL/L (ref 20–32)
CREAT SERPL-MCNC: 0.72 MG/DL (ref 0.52–1.04)
DIFFERENTIAL METHOD BLD: NORMAL
EOSINOPHIL # BLD AUTO: 0.1 10E9/L (ref 0–0.7)
EOSINOPHIL NFR BLD AUTO: 0.8 %
ERYTHROCYTE [DISTWIDTH] IN BLOOD BY AUTOMATED COUNT: 13 % (ref 10–15)
GFR SERPL CREATININE-BSD FRML MDRD: >90 ML/MIN/{1.73_M2}
GLUCOSE SERPL-MCNC: 87 MG/DL (ref 70–99)
HCT VFR BLD AUTO: 41.9 % (ref 35–47)
HGB BLD-MCNC: 13.8 G/DL (ref 11.7–15.7)
IMM GRANULOCYTES # BLD: 0 10E9/L (ref 0–0.4)
IMM GRANULOCYTES NFR BLD: 0.2 %
LYMPHOCYTES # BLD AUTO: 2.8 10E9/L (ref 0.8–5.3)
LYMPHOCYTES NFR BLD AUTO: 44.7 %
MCH RBC QN AUTO: 30.9 PG (ref 26.5–33)
MCHC RBC AUTO-ENTMCNC: 32.9 G/DL (ref 31.5–36.5)
MCV RBC AUTO: 94 FL (ref 78–100)
MONOCYTES # BLD AUTO: 0.6 10E9/L (ref 0–1.3)
MONOCYTES NFR BLD AUTO: 10.2 %
NEUTROPHILS # BLD AUTO: 2.7 10E9/L (ref 1.6–8.3)
NEUTROPHILS NFR BLD AUTO: 43.5 %
PLATELET # BLD AUTO: 200 10E9/L (ref 150–450)
POTASSIUM SERPL-SCNC: 4 MMOL/L (ref 3.4–5.3)
PROT SERPL-MCNC: 7.4 G/DL (ref 6.8–8.8)
RBC # BLD AUTO: 4.47 10E12/L (ref 3.8–5.2)
SODIUM SERPL-SCNC: 139 MMOL/L (ref 133–144)
T4 FREE SERPL-MCNC: 0.79 NG/DL (ref 0.76–1.46)
TSH SERPL DL<=0.005 MIU/L-ACNC: 5.32 MU/L (ref 0.4–4)
WBC # BLD AUTO: 6.3 10E9/L (ref 4–11)

## 2019-07-01 PROCEDURE — 99204 OFFICE O/P NEW MOD 45 MIN: CPT | Performed by: PHYSICIAN ASSISTANT

## 2019-07-01 PROCEDURE — 86706 HEP B SURFACE ANTIBODY: CPT | Performed by: PHYSICIAN ASSISTANT

## 2019-07-01 PROCEDURE — 85025 COMPLETE CBC W/AUTO DIFF WBC: CPT | Performed by: PHYSICIAN ASSISTANT

## 2019-07-01 PROCEDURE — 86704 HEP B CORE ANTIBODY TOTAL: CPT | Performed by: PHYSICIAN ASSISTANT

## 2019-07-01 PROCEDURE — 87340 HEPATITIS B SURFACE AG IA: CPT | Performed by: PHYSICIAN ASSISTANT

## 2019-07-01 PROCEDURE — 80053 COMPREHEN METABOLIC PANEL: CPT | Performed by: PHYSICIAN ASSISTANT

## 2019-07-01 PROCEDURE — 83540 ASSAY OF IRON: CPT | Performed by: PHYSICIAN ASSISTANT

## 2019-07-01 PROCEDURE — 36415 COLL VENOUS BLD VENIPUNCTURE: CPT | Performed by: PHYSICIAN ASSISTANT

## 2019-07-01 PROCEDURE — 86803 HEPATITIS C AB TEST: CPT | Performed by: PHYSICIAN ASSISTANT

## 2019-07-01 PROCEDURE — 82728 ASSAY OF FERRITIN: CPT | Performed by: PHYSICIAN ASSISTANT

## 2019-07-01 PROCEDURE — 84443 ASSAY THYROID STIM HORMONE: CPT | Performed by: PHYSICIAN ASSISTANT

## 2019-07-01 PROCEDURE — 83550 IRON BINDING TEST: CPT | Performed by: PHYSICIAN ASSISTANT

## 2019-07-01 PROCEDURE — 84439 ASSAY OF FREE THYROXINE: CPT | Performed by: PHYSICIAN ASSISTANT

## 2019-07-01 ASSESSMENT — MIFFLIN-ST. JEOR: SCORE: 1205.48

## 2019-07-01 ASSESSMENT — PAIN SCALES - GENERAL: PAINLEVEL: NO PAIN (0)

## 2019-07-01 NOTE — PROGRESS NOTES
GASTROENTEROLOGY NEW PATIENT CLINIC VISIT    CC/REFERRING MD:    La Scott    REASON FOR CONSULTATION:   Referred by Karie Acevedo for Consult (bloody diarrhea, rectal bleeding)      HISTORY OF PRESENT ILLNESS:    Marjorie Lee is 34 year old female who presents for evaluation of bloody diarrhea that has occurred about 12 times in the past several months. Symptoms usually occur after an aggressive run.  She has been running since 6 grade and has run half-marathons and marathons without any issues in the past. In the last year she admits she has been training more aggressively than before for a marathon. Bloody diarrhea, abdominal cramping and fecal urgency that occur after these aggressive runs. She will experience 4-5 bloody loose BM's for several hours until symptoms eventually dissipate usually that day or following day. On one occasion she had taken an ibuprofen which made her bleeding worse. She denies taking NSAID's excessively or regularly and after that incident she avoids NSAID's. Her last episode was about 2 weeks ago when she finished training for a marathon.       She denies any abdominal pain, n/v or diarrhea outside of those episodes. She normally has 1-2 bowel movements a day without difficulty. No diarrhea or constipation.  No acid reflux or heartburn. No urinary symptoms/complaints.     She does not smoke. Drinks alcohol occ/socially.     No previous abdominal surgeries.     Family history is significant for maternal grandmother with colon cancer diagnosed in her late 70's. Both of her parents have history of pre cancerous polyps. No family hx of IBD.       PREVIOUS ENDOSCOPY:  No previous endoscopies     PROBLEM LIST  Patient Active Problem List    Diagnosis Date Noted     IUD (intrauterine device) in place 07/17/2018     Priority: Medium       PERTINENT PAST MEDICAL HISTORY:  (I personally reviewed this history with the patient at today's visit)  Past  Medical History:   Diagnosis Date     NO ACTIVE PROBLEMS          PREVIOUS SURGERIES: (I personally reviewed this history with the patient at today's visit)   No previous abdominal surgeries.     ALLERGIES:   No Known Allergies    PERTINENT MEDICATIONS:    Current Outpatient Medications:      Multiple Vitamins-Minerals (MULTIVITAMIN PO), , Disp: , Rfl:      paragard intrauterine copper, 1 each by Intrauterine route once for 1 dose, Disp: 1 each, Rfl:     SOCIAL HISTORY:  Social History     Socioeconomic History     Marital status:      Spouse name: Not on file     Number of children: Not on file     Years of education: Not on file     Highest education level: Not on file   Occupational History     Employer: [employer]     Financial resource strain: Not on file     Food insecurity:     Worry: Not on file     Inability: Not on file     Transportation needs:     Medical: Not on file     Non-medical: Not on file   Tobacco Use     Smoking status: Never Smoker     Smokeless tobacco: Never Used   Substance and Sexual Activity     Alcohol use: Yes     Comment: occ     Drug use: No     Sexual activity: Yes     Partners: Male     Birth control/protection: Implant   Lifestyle     Physical activity:     Days per week: Not on file     Minutes per session: Not on file     Stress: Not on file   Relationships     Social connections:     Talks on phone: Not on file     Gets together: Not on file     Attends [employer] service: Not on file     Active member of club or organization: Not on file     Attends meetings of clubs or organizations: Not on file     Relationship status: Not on file     Intimate partner violence:     Fear of current or ex partner: Not on file     Emotionally abused: Not on file     Physically abused: Not on file     Forced sexual activity: Not on file   Other Topics Concern     Not on file   Social History Narrative     Not on file       FAMILY HISTORY: (I personally reviewed this history  "with the patient at today's visit)  Family History   Problem Relation Age of Onset     Osteoarthritis Mother      Osteoarthritis Father      Breast Cancer Maternal Aunt      Other - See Comments Paternal Uncle         glioblastoma     Other - See Comments Paternal Uncle         glioblastoma       ROS:    No fevers or chills  No weight loss  No blurry vision, double vision or change in vision  No sore throat  No lymphadenopathy  No headache, paraesthesias, or weakness in a limb  No shortness of breath or wheezing  No chest pain or pressure  No arthralgias or myalgias  No rashes or skin changes  No odynophagia or dysphagia  +bloody diarrhea after aggressive runs.   No dysuria, frequency or urgency  No hot/cold intolerance or polyria  No anxiety or depression  PHYSICAL EXAMINATION:  Constitutional: aaox3, cooperative, pleasant, not dyspneic/diaphoretic, no acute distress   Vitals reviewed: /79 (BP Location: Left arm, Patient Position: Sitting, Cuff Size: Adult Regular)   Pulse 60   Ht 1.613 m (5' 3.5\")   Wt 53.8 kg (118 lb 11.2 oz)   LMP  (LMP Unknown)   SpO2 98%   BMI 20.70 kg/m     Wt:   Wt Readings from Last 2 Encounters:   06/19/19 52.6 kg (116 lb)   08/14/18 55.7 kg (122 lb 12.8 oz)          Eyes: Sclera anicteric/injected  Ears/nose/mouth/throat: Normal oropharynx without ulcers or exudate, mucus membranes moist, hearing intact  Neck: supple, thyroid normal size  CV: No edema, RRR  Respiratory: Unlabored breathing, CTAB  Lymph: No submandibular, supraclavicular or inguinal lymphadenopathy  Abd: Nondistended, no masses, +bs, no hepatosplenomegaly, nontender, no peritoneal signs  Skin: warm, perfused, no jaundice  Psych: Normal affect  MSK: Normal gait      ASSESSMENT/PLAN:    Marjorie Lee is a 34 year old female who presents for evaluation of bloody diarrhea and rectal pain that occurs after aggressive runs. She is a runner and has ran half-marathons as well as marathons without any difficulty in the " past. In the last year she admits to training more aggressively and with these runs she has noted episodes of bloody diarrhea and abdominal cramping afterwards. Symptoms would typically resolve within a day but has occurred on several occassions. Her last episode was about 2 weeks ago after she finished training for a marathon. She notes no abdominal cramping or rectal bleeding at this time. Her symptoms are most consistent with an ischemic colitis related to poor perfusion after aggressive and long runs. We discussed avoiding aggressive runs or at least staying well hydrated prior to runs. Also discussed avoiding NSAID's which she does since the one time she took an ibuprofen she noticed worsening symptoms. Recommended colonoscopy to r/o any injury to her colon and to r/o any other potential causes to her rectal bleeding. She agrees with this plan. There are no recent labs so we will check today a cbc, cmp and tsh.     Follow up 2-3 weeks after colonoscopy to review results.       Bloody diarrhea  Rectal pain    Orders Placed This Encounter   Procedures     CBC with platelets differential     Comprehensive metabolic panel     TSH with free T4 reflex     GASTROENTEROLOGY ADULT REF PROCEDURE ONLY [facility] [phone]; (Dr. Win)       Thank you for this consultation.  It was a pleasure to participate in the care of this patient; please contact us with any further questions.      This note was created with voice recognition software, and while reviewed for accuracy, typos may remain.     Cassy Young PA-C  Gastroenterology  [facility]

## 2019-07-01 NOTE — NURSING NOTE
"Marjorie Lee's goals for this visit include:   Chief Complaint   Patient presents with     Consult     Patient reports she is having blood in her stool at least a dozen times in the past couple months; Patient reports this happened when she was training for a marathon; Denies constipation       She requests these members of her care team be copied on today's visit information: PCP    PCP: La Scott    Referring Provider:  MARCY Campos CNP  [id number]  [place] [address]    /79 (BP Location: Left arm, Patient Position: Sitting, Cuff Size: Adult Regular)   Pulse 60   Ht 1.613 m (5' 3.5\")   Wt 53.8 kg (118 lb 11.2 oz)   LMP  (LMP Unknown)   SpO2 98%   BMI 20.70 kg/m      Do you need any medication refills at today's visit? No    Willa Amezquita LPN      "

## 2019-07-01 NOTE — PATIENT INSTRUCTIONS
Please call [phone] to schedule a colonoscopy with Dr. Win.    Follow up 2-3 weeks after your colonoscopy.

## 2019-07-01 NOTE — LETTER
7/1/2019         RE: Marjorie Lee  [address]  [place] [address]        Dear Colleague,    Thank you for referring your patient, Marjorie Lee, to [facility]. Please see a copy of my visit note below.            GASTROENTEROLOGY NEW PATIENT CLINIC VISIT    CC/REFERRING MD:    La Scott    REASON FOR CONSULTATION:   Referred by Karie Acevedo for Consult (bloody diarrhea, rectal bleeding)      HISTORY OF PRESENT ILLNESS:    Marjorie Lee is 34 year old female who presents for evaluation of bloody diarrhea that has occurred about 12 times in the past several months. Symptoms usually occur after an aggressive run.  She has been running since 6 grade and has run half-marathons and marathons without any issues in the past. In the last year she admits she has been training more aggressively than before for a marathon. Bloody diarrhea and cramping occur after these aggressive runs, 4-5 BM's for several hours until symptoms eventually dissipate usually that day or following day. On one occasion she had taken an ibuprofen which made her bleeding worse. She denies taking NSAID's excessively or regularly and after that incident she avoids NSAID's. Her last episode was about 2 weeks ago when she finished training for a marathon.       She denies any abdominal pain, n/v or diarrhea outside of those episodes. She normally has 1-2 bowel movements a day without difficulty. No diarrhea or constipation.      She does not smoke. Drinks alcohol occ/socially.     No previous abdominal surgeries.     Family history is significant for maternal grandmother with colon cancer diagnosed in her late 70's. Both of her parents have history of pre cancerous polyps. No family hx of IBD.       PREVIOUS ENDOSCOPY:  No previous endoscopies     PROBLEM LIST  Patient Active Problem List    Diagnosis Date Noted     IUD (intrauterine device) in place 07/17/2018     Priority:  Medium       PERTINENT PAST MEDICAL HISTORY:  (I personally reviewed this history with the patient at today's visit)  Past Medical History:   Diagnosis Date     NO ACTIVE PROBLEMS          PREVIOUS SURGERIES: (I personally reviewed this history with the patient at today's visit)   No previous abdominal surgeries.     ALLERGIES:   No Known Allergies    PERTINENT MEDICATIONS:    Current Outpatient Medications:      Multiple Vitamins-Minerals (MULTIVITAMIN PO), , Disp: , Rfl:      paragard intrauterine copper, 1 each by Intrauterine route once for 1 dose, Disp: 1 each, Rfl:     SOCIAL HISTORY:  Social History     Socioeconomic History     Marital status:      Spouse name: Not on file     Number of children: Not on file     Years of education: Not on file     Highest education level: Not on file   Occupational History     Employer: [employer]     Financial resource strain: Not on file     Food insecurity:     Worry: Not on file     Inability: Not on file     Transportation needs:     Medical: Not on file     Non-medical: Not on file   Tobacco Use     Smoking status: Never Smoker     Smokeless tobacco: Never Used   Substance and Sexual Activity     Alcohol use: Yes     Comment: occ     Drug use: No     Sexual activity: Yes     Partners: Male     Birth control/protection: Implant   Lifestyle     Physical activity:     Days per week: Not on file     Minutes per session: Not on file     Stress: Not on file   Relationships     Social connections:     Talks on phone: Not on file     Gets together: Not on file     Attends [employer] service: Not on file     Active member of club or organization: Not on file     Attends meetings of clubs or organizations: Not on file     Relationship status: Not on file     Intimate partner violence:     Fear of current or ex partner: Not on file     Emotionally abused: Not on file     Physically abused: Not on file     Forced sexual activity: Not on file   Other Topics  "Concern     Not on file   Social History Narrative     Not on file       FAMILY HISTORY: (I personally reviewed this history with the patient at today's visit)  Family History   Problem Relation Age of Onset     Osteoarthritis Mother      Osteoarthritis Father      Breast Cancer Maternal Aunt      Other - See Comments Paternal Uncle         glioblastoma     Other - See Comments Paternal Uncle         glioblastoma       ROS:    No fevers or chills  No weight loss  No blurry vision, double vision or change in vision  No sore throat  No lymphadenopathy  No headache, paraesthesias, or weakness in a limb  No shortness of breath or wheezing  No chest pain or pressure  No arthralgias or myalgias  No rashes or skin changes  No odynophagia or dysphagia  +bloody diarrhea after aggressive runs.   No dysuria, frequency or urgency  No hot/cold intolerance or polyria  No anxiety or depression  PHYSICAL EXAMINATION:  Constitutional: aaox3, cooperative, pleasant, not dyspneic/diaphoretic, no acute distress   Vitals reviewed: /79 (BP Location: Left arm, Patient Position: Sitting, Cuff Size: Adult Regular)   Pulse 60   Ht 1.613 m (5' 3.5\")   Wt 53.8 kg (118 lb 11.2 oz)   LMP  (LMP Unknown)   SpO2 98%   BMI 20.70 kg/m      Wt:   Wt Readings from Last 2 Encounters:   06/19/19 52.6 kg (116 lb)   08/14/18 55.7 kg (122 lb 12.8 oz)          Eyes: Sclera anicteric/injected  Ears/nose/mouth/throat: Normal oropharynx without ulcers or exudate, mucus membranes moist, hearing intact  Neck: supple, thyroid normal size  CV: No edema, RRR  Respiratory: Unlabored breathing, CTAB  Lymph: No submandibular, supraclavicular or inguinal lymphadenopathy  Abd: Nondistended, no masses, +bs, no hepatosplenomegaly, nontender, no peritoneal signs  Skin: warm, perfused, no jaundice  Psych: Normal affect  MSK: Normal gait      ASSESSMENT/PLAN:    Marjorie Lee is a 34 year old female who presents for evaluation of bloody diarrhea and rectal pain " that occurs after aggressive runs. She is a runner and has ran half-marathons as well as marathons without any difficulty in the past. In the last year she admits to training more aggressively and with these runs she has noted episodes of bloody diarrhea and abdominal cramping afterwards. Symptoms would typically resolve within a day but has occurred on several occassions. Her last episode was about 2 weeks ago after she finished training for a marathon. She notes no abdominal cramping or rectal bleeding at this time. Her symptoms are most consistent with an ischemic colitis related to poor perfusion after aggressive and long runs. We discussed avoiding aggressive runs or at least staying well hydrated prior to runs. Also discussed avoiding NSAID's which she does since the one time she took an ibuprofen she noticed worsening symptoms. Recommended colonoscopy to r/o any injury to her colon and to r/o any other potential causes to her rectal bleeding. She agrees with this plan. There are no recent labs so we will check today a cbc, cmp and tsh.     Follow up 2-3 weeks after colonoscopy to review results.       Bloody diarrhea  Rectal pain    Orders Placed This Encounter   Procedures     CBC with platelets differential     Comprehensive metabolic panel     TSH with free T4 reflex     GASTROENTEROLOGY ADULT REF PROCEDURE ONLY [facility] [phone]; (Dr. Win)       Thank you for this consultation.  It was a pleasure to participate in the care of this patient; please contact us with any further questions.      This note was created with voice recognition software, and while reviewed for accuracy, typos may remain.     Cassy Young PA-C  Gastroenterology  [facility]      Again, thank you for allowing me to participate in the care of your patient.        Sincerely,        Cassy Young PA-C

## 2019-07-02 DIAGNOSIS — R74.01 ELEVATED SGOT (AST): Primary | ICD-10-CM

## 2019-07-02 LAB
FERRITIN SERPL-MCNC: 28 NG/ML (ref 12–150)
HBV CORE AB SERPL QL IA: NONREACTIVE
HBV SURFACE AB SERPL IA-ACNC: >1000 M[IU]/ML
HBV SURFACE AG SERPL QL IA: NONREACTIVE
HCV AB SERPL QL IA: NONREACTIVE
IRON SATN MFR SERPL: 18 % (ref 15–46)
IRON SERPL-MCNC: 61 UG/DL (ref 35–180)
TIBC SERPL-MCNC: 344 UG/DL (ref 240–430)

## 2019-08-05 ENCOUNTER — TELEPHONE (OUTPATIENT)
Dept: SURGERY | Facility: CLINIC | Age: 34
End: 2019-08-05

## 2019-08-05 NOTE — TELEPHONE ENCOUNTER
Location: [facility]  Is this a cancellation or reschedule?  no  The name of the procedure: colonoscopy  Provider scheduled with: Angel  Date 09/03/2019 Time 7:30am

## 2019-08-29 RX ORDER — FLUMAZENIL 0.1 MG/ML
0.2 INJECTION, SOLUTION INTRAVENOUS
Status: CANCELLED | OUTPATIENT
Start: 2019-08-29 | End: 2019-08-29

## 2019-08-29 RX ORDER — ONDANSETRON 4 MG/1
4 TABLET, ORALLY DISINTEGRATING ORAL EVERY 6 HOURS PRN
Status: CANCELLED | OUTPATIENT
Start: 2019-08-29

## 2019-08-29 RX ORDER — ONDANSETRON 2 MG/ML
4 INJECTION INTRAMUSCULAR; INTRAVENOUS EVERY 6 HOURS PRN
Status: CANCELLED | OUTPATIENT
Start: 2019-08-29

## 2019-08-29 RX ORDER — NALOXONE HYDROCHLORIDE 0.4 MG/ML
.1-.4 INJECTION, SOLUTION INTRAMUSCULAR; INTRAVENOUS; SUBCUTANEOUS
Status: CANCELLED | OUTPATIENT
Start: 2019-08-29 | End: 2019-08-30

## 2019-09-03 ENCOUNTER — SURGERY (OUTPATIENT)
Age: 34
End: 2019-09-03
Payer: COMMERCIAL

## 2019-09-03 ENCOUNTER — HOSPITAL ENCOUNTER (OUTPATIENT)
Facility: AMBULATORY SURGERY CENTER | Age: 34
Discharge: HOME | End: 2019-09-03
Attending: INTERNAL MEDICINE | Admitting: INTERNAL MEDICINE
Payer: COMMERCIAL

## 2019-09-03 VITALS
RESPIRATION RATE: 16 BRPM | TEMPERATURE: 97.5 F | DIASTOLIC BLOOD PRESSURE: 64 MMHG | OXYGEN SATURATION: 99 % | SYSTOLIC BLOOD PRESSURE: 104 MMHG | HEART RATE: 70 BPM

## 2019-09-03 LAB — COLONOSCOPY: NORMAL

## 2019-09-03 PROCEDURE — 45380 COLONOSCOPY AND BIOPSY: CPT | Performed by: INTERNAL MEDICINE

## 2019-09-03 PROCEDURE — G8907 PT DOC NO EVENTS ON DISCHARG: HCPCS

## 2019-09-03 PROCEDURE — G8918 PT W/O PREOP ORDER IV AB PRO: HCPCS

## 2019-09-03 PROCEDURE — 45380 COLONOSCOPY AND BIOPSY: CPT | Mod: 74

## 2019-09-03 PROCEDURE — 88305 TISSUE EXAM BY PATHOLOGIST: CPT | Performed by: INTERNAL MEDICINE

## 2019-09-03 RX ORDER — LIDOCAINE 40 MG/G
CREAM TOPICAL
Status: DISCONTINUED | OUTPATIENT
Start: 2019-09-03 | End: 2019-09-04 | Stop reason: HOSPADM

## 2019-09-03 RX ORDER — ONDANSETRON 2 MG/ML
4 INJECTION INTRAMUSCULAR; INTRAVENOUS
Status: DISCONTINUED | OUTPATIENT
Start: 2019-09-03 | End: 2019-09-04 | Stop reason: HOSPADM

## 2019-09-03 RX ORDER — FENTANYL CITRATE 50 UG/ML
INJECTION, SOLUTION INTRAMUSCULAR; INTRAVENOUS PRN
Status: DISCONTINUED | OUTPATIENT
Start: 2019-09-03 | End: 2019-09-03 | Stop reason: HOSPADM

## 2019-09-03 RX ADMIN — FENTANYL CITRATE 50 MCG: 50 INJECTION, SOLUTION INTRAMUSCULAR; INTRAVENOUS at 10:16

## 2019-09-03 RX ADMIN — FENTANYL CITRATE 25 MCG: 50 INJECTION, SOLUTION INTRAMUSCULAR; INTRAVENOUS at 10:30

## 2019-09-03 RX ADMIN — FENTANYL CITRATE 25 MCG: 50 INJECTION, SOLUTION INTRAMUSCULAR; INTRAVENOUS at 10:34

## 2019-09-03 RX ADMIN — FENTANYL CITRATE 25 MCG: 50 INJECTION, SOLUTION INTRAMUSCULAR; INTRAVENOUS at 10:18

## 2019-09-03 RX ADMIN — FENTANYL CITRATE 25 MCG: 50 INJECTION, SOLUTION INTRAMUSCULAR; INTRAVENOUS at 10:21

## 2019-09-05 LAB — COPATH REPORT: NORMAL

## 2019-09-09 DIAGNOSIS — K92.1 HEMATOCHEZIA: Primary | ICD-10-CM

## 2019-09-09 DIAGNOSIS — R19.7 DIARRHEA, UNSPECIFIED TYPE: ICD-10-CM

## 2019-09-10 ENCOUNTER — ANCILLARY PROCEDURE (OUTPATIENT)
Dept: CT IMAGING | Facility: CLINIC | Age: 34
End: 2019-09-10
Attending: INTERNAL MEDICINE
Payer: COMMERCIAL

## 2019-09-10 DIAGNOSIS — R19.7 DIARRHEA, UNSPECIFIED TYPE: ICD-10-CM

## 2019-09-10 DIAGNOSIS — K92.1 HEMATOCHEZIA: ICD-10-CM

## 2019-09-10 PROCEDURE — 74177 CT ABD & PELVIS W/CONTRAST: CPT | Performed by: RADIOLOGY

## 2019-09-10 RX ORDER — IOPAMIDOL 755 MG/ML
73 INJECTION, SOLUTION INTRAVASCULAR ONCE
Status: COMPLETED | OUTPATIENT
Start: 2019-09-10 | End: 2019-09-10

## 2019-09-10 RX ADMIN — IOPAMIDOL 73 ML: 755 INJECTION, SOLUTION INTRAVASCULAR at 11:32

## 2019-09-14 ASSESSMENT — ENCOUNTER SYMPTOMS
SORE THROAT: 0
ABDOMINAL PAIN: 0
EYE PAIN: 0
DIARRHEA: 0
FEVER: 0
ARTHRALGIAS: 0
HEMATURIA: 0
CHILLS: 0
SHORTNESS OF BREATH: 0
FREQUENCY: 0
NERVOUS/ANXIOUS: 1
MYALGIAS: 0
DYSURIA: 0
HEMATOCHEZIA: 1
PALPITATIONS: 1
HEADACHES: 0
BREAST MASS: 1
WEAKNESS: 0
DIZZINESS: 0
CONSTIPATION: 0
COUGH: 0
JOINT SWELLING: 0
NAUSEA: 0
HEARTBURN: 0
PARESTHESIAS: 0

## 2019-09-17 ENCOUNTER — OFFICE VISIT (OUTPATIENT)
Dept: FAMILY MEDICINE | Facility: CLINIC | Age: 34
End: 2019-09-17
Payer: COMMERCIAL

## 2019-09-17 VITALS
SYSTOLIC BLOOD PRESSURE: 129 MMHG | HEIGHT: 64 IN | DIASTOLIC BLOOD PRESSURE: 75 MMHG | HEART RATE: 74 BPM | BODY MASS INDEX: 20.04 KG/M2 | WEIGHT: 117.4 LBS | OXYGEN SATURATION: 95 % | TEMPERATURE: 98.6 F

## 2019-09-17 DIAGNOSIS — R74.01 ELEVATED SGOT (AST): ICD-10-CM

## 2019-09-17 DIAGNOSIS — D22.9 MULTIPLE NEVI: ICD-10-CM

## 2019-09-17 DIAGNOSIS — R93.3 ABNORMAL COLONOSCOPY: ICD-10-CM

## 2019-09-17 DIAGNOSIS — R74.01 ELEVATED AST (SGOT): ICD-10-CM

## 2019-09-17 DIAGNOSIS — M79.605 PAIN OF LEFT LOWER EXTREMITY: ICD-10-CM

## 2019-09-17 DIAGNOSIS — G57.01 PIRIFORMIS SYNDROME, RIGHT: ICD-10-CM

## 2019-09-17 DIAGNOSIS — Z13.1 SCREENING FOR DIABETES MELLITUS: ICD-10-CM

## 2019-09-17 DIAGNOSIS — K62.5 RECTAL BLEEDING: ICD-10-CM

## 2019-09-17 DIAGNOSIS — Z00.00 ROUTINE GENERAL MEDICAL EXAMINATION AT A HEALTH CARE FACILITY: Primary | ICD-10-CM

## 2019-09-17 DIAGNOSIS — R79.89 ABNORMAL TSH: ICD-10-CM

## 2019-09-17 DIAGNOSIS — Z13.6 CARDIOVASCULAR SCREENING; LDL GOAL LESS THAN 130: ICD-10-CM

## 2019-09-17 LAB
ALBUMIN SERPL-MCNC: 3.8 G/DL (ref 3.4–5)
ALP SERPL-CCNC: 48 U/L (ref 40–150)
ALT SERPL W P-5'-P-CCNC: 28 U/L (ref 0–50)
AST SERPL W P-5'-P-CCNC: 21 U/L (ref 0–45)
BILIRUB DIRECT SERPL-MCNC: 0.1 MG/DL (ref 0–0.2)
BILIRUB SERPL-MCNC: 0.5 MG/DL (ref 0.2–1.3)
CHOLEST SERPL-MCNC: 147 MG/DL
GLUCOSE BLD-MCNC: 87 MG/DL (ref 70–99)
HDLC SERPL-MCNC: 74 MG/DL
LDLC SERPL CALC-MCNC: 66 MG/DL
NONHDLC SERPL-MCNC: 73 MG/DL
PROT SERPL-MCNC: 7.6 G/DL (ref 6.8–8.8)
TRIGL SERPL-MCNC: 33 MG/DL
TSH SERPL DL<=0.005 MIU/L-ACNC: 3.72 MU/L (ref 0.4–4)

## 2019-09-17 PROCEDURE — 83516 IMMUNOASSAY NONANTIBODY: CPT | Mod: 91 | Performed by: PHYSICIAN ASSISTANT

## 2019-09-17 PROCEDURE — 84443 ASSAY THYROID STIM HORMONE: CPT | Performed by: NURSE PRACTITIONER

## 2019-09-17 PROCEDURE — 99395 PREV VISIT EST AGE 18-39: CPT | Performed by: NURSE PRACTITIONER

## 2019-09-17 PROCEDURE — 80061 LIPID PANEL: CPT | Performed by: NURSE PRACTITIONER

## 2019-09-17 PROCEDURE — 83516 IMMUNOASSAY NONANTIBODY: CPT | Performed by: PHYSICIAN ASSISTANT

## 2019-09-17 PROCEDURE — 36415 COLL VENOUS BLD VENIPUNCTURE: CPT | Performed by: NURSE PRACTITIONER

## 2019-09-17 PROCEDURE — 80076 HEPATIC FUNCTION PANEL: CPT | Performed by: NURSE PRACTITIONER

## 2019-09-17 PROCEDURE — 86708 HEPATITIS A ANTIBODY: CPT | Performed by: PHYSICIAN ASSISTANT

## 2019-09-17 PROCEDURE — 82947 ASSAY GLUCOSE BLOOD QUANT: CPT | Performed by: NURSE PRACTITIONER

## 2019-09-17 ASSESSMENT — ENCOUNTER SYMPTOMS
HEADACHES: 0
DIARRHEA: 0
MYALGIAS: 0
FEVER: 0
NERVOUS/ANXIOUS: 1
ARTHRALGIAS: 0
HEMATURIA: 0
ABDOMINAL PAIN: 0
BREAST MASS: 1
PALPITATIONS: 1
PARESTHESIAS: 0
NAUSEA: 0
DYSURIA: 0
DIZZINESS: 0
COUGH: 0
FREQUENCY: 0
WEAKNESS: 0
EYE PAIN: 0
JOINT SWELLING: 0
SORE THROAT: 0
SHORTNESS OF BREATH: 0
CHILLS: 0
HEARTBURN: 0
CONSTIPATION: 0
HEMATOCHEZIA: 1

## 2019-09-17 ASSESSMENT — PAIN SCALES - GENERAL: PAINLEVEL: NO PAIN (0)

## 2019-09-17 ASSESSMENT — MIFFLIN-ST. JEOR: SCORE: 1199.58

## 2019-09-17 NOTE — PATIENT INSTRUCTIONS
Preventive Health Recommendations  Female Ages 26 - 39  Yearly exam:   See your health care provider every year in order to    Review health changes.     Discuss preventive care.      Review your medicines if you your doctor has prescribed any.    Until age 30: Get a Pap test every three years (more often if you have had an abnormal result).    After age 30: Talk to your doctor about whether you should have a Pap test every 3 years or have a Pap test with HPV screening every 5 years.   You do not need a Pap test if your uterus was removed (hysterectomy) and you have not had cancer.  You should be tested each year for STDs (sexually transmitted diseases), if you're at risk.   Talk to your provider about how often to have your cholesterol checked.  If you are at risk for diabetes, you should have a diabetes test (fasting glucose).  Shots: Get a flu shot each year. Get a tetanus shot every 10 years.   Nutrition:     Eat at least 5 servings of fruits and vegetables each day.    Eat whole-grain bread, whole-wheat pasta and brown rice instead of white grains and rice.    Get adequate Calcium and Vitamin D.     Lifestyle    Exercise at least 150 minutes a week (30 minutes a day, 5 days of the week). This will help you control your weight and prevent disease.    Limit alcohol to one drink per day.    No smoking.     Wear sunscreen to prevent skin cancer.    See your dentist every six months for an exam and cleaning.      At [facility], we strive to deliver an exceptional experience to you, every time we see you.  If you receive a survey in the mail, please send us back your thoughts. We really do value your feedback.    Based on your medical history, these are the current health maintenance/preventive care services that you are due for (some may have been done at this visit.)  Health Maintenance Due   Topic Date Due     HIV SCREENING  02/17/1998     DTAP/TDAP/TD IMMUNIZATION (1 - Tdap) 02/17/2008      PREVENTIVE CARE VISIT  07/17/2019     INFLUENZA VACCINE (1) 09/01/2019         Suggested websites for health information:  Www.[facility].org : Up to date and easily searchable information on multiple topics.  Www.medlineplus.gov : medication info, interactive tutorials, watch real surgeries online  Www.familydoctor.org : good info from the Academy of Family Physicians  Www.cdc.gov : public health info, travel advisories, epidemics (H1N1)  Www.aap.org : children's health info, normal development, vaccinations  Www.health.[facility].[place].us : [place] dept of health, public health issues in [place], N1N1    Your care team:                            Family Medicine Internal Medicine   MD Michael Sanders MD Shantel Branch-Fleming, MD Katya Georgiev PA-C Nam Ho, MD Pediatrics   LEN Hicks, MIGDALIA Saunders APRN MD Nilda Mendoza MD Deborah Mielke, MD Kim Thein, APRN CNP      Clinic hours: Monday - Thursday 7 am-7 pm; Fridays 7 am-5 pm.   Urgent care: Monday - Friday 11 am-9 pm; Saturday and Sunday 9 am-5 pm.  Pharmacy : Monday -Thursday 8 am-8 pm; Friday 8 am-6 pm; Saturday and Sunday 9 am-5 pm.     Clinic: [phone]   Pharmacy: [phone]

## 2019-09-17 NOTE — PROGRESS NOTES
SUBJECTIVE:   CC: Marjorie Lee is an 34 year old woman who presents for preventive health visit.     Healthy Habits:     Getting at least 3 servings of Calcium per day:  Yes    Bi-annual eye exam:  Yes    Dental care twice a year:  Yes    Sleep apnea or symptoms of sleep apnea:  None    Diet:  Regular (no restrictions)    Frequency of exercise:  4-5 days/week    Duration of exercise:  Greater than 60 minutes    Taking medications regularly:  Yes    Medication side effects:  Not applicable    PHQ-2 Total Score: 0    Additional concerns today:  Yes        Today's PHQ-2 Score:   PHQ-2 ( 1999 Pfizer) 9/14/2019   Q1: Little interest or pleasure in doing things 0   Q2: Feeling down, depressed or hopeless 0   PHQ-2 Score 0   Q1: Little interest or pleasure in doing things Not at all   Q2: Feeling down, depressed or hopeless Not at all   PHQ-2 Score 0       Abuse: Current or Past(Physical, Sexual or Emotional)- No  Do you feel safe in your environment? Yes    Social History     Tobacco Use     Smoking status: Never Smoker     Smokeless tobacco: Never Used   Substance Use Topics     Alcohol use: Yes     Comment: occ     If you drink alcohol do you typically have >3 drinks per day or >7 drinks per week? No    Alcohol Use 9/14/2019   Prescreen: >3 drinks/day or >7 drinks/week? No       Reviewed orders with patient.  Reviewed health maintenance and updated orders accordingly - Yes  BP Readings from Last 3 Encounters:   09/17/19 129/75   09/03/19 104/64   07/01/19 117/79    Wt Readings from Last 3 Encounters:   09/17/19 53.3 kg (117 lb 6.4 oz)   07/01/19 53.8 kg (118 lb 11.2 oz)   06/19/19 52.6 kg (116 lb)                  Patient Active Problem List   Diagnosis     IUD (intrauterine device) in place     Abnormal colonoscopy     Elevated AST (SGOT)     Rectal bleeding     Abnormal TSH     Past Surgical History:   Procedure Laterality Date     COLONOSCOPY N/A 9/3/2019    Procedure: Colonoscopy, With Polypectomy And Biopsy;   Surgeon: Radha Neri DO;  Location: [facility]     COLONOSCOPY WITH CO2 INSUFFLATION N/A 9/3/2019    Procedure: COLONOSCOPY, WITH CO2 INSUFFLATION;  Surgeon: Radha Neri DO;  Location: [facility]       Social History     Tobacco Use     Smoking status: Never Smoker     Smokeless tobacco: Never Used   Substance Use Topics     Alcohol use: Yes     Comment: occ     Family History   Problem Relation Age of Onset     Osteoarthritis Mother      Osteoarthritis Father      Breast Cancer Maternal Aunt      Other - See Comments Paternal Uncle         glioblastoma     Other - See Comments Paternal Uncle         glioblastoma     Colon Cancer Maternal Grandmother          Current Outpatient Medications   Medication Sig Dispense Refill     Multiple Vitamins-Minerals (MULTIVITAMIN PO)        paragard intrauterine copper 1 each by Intrauterine route once for 1 dose 1 each      No Known Allergies    Mammogram not appropriate for this patient based on age.    Pertinent mammograms are reviewed under the imaging tab.  History of abnormal Pap smear: NO - age 30-65 PAP every 5 years with negative HPV co-testing recommended     Reviewed and updated as needed this visit by clinical staff  Tobacco  Allergies  Meds  Med Hx  Surg Hx  Fam Hx  Soc Hx        Reviewed and updated as needed this visit by Provider        Past Medical History:   Diagnosis Date     NO ACTIVE PROBLEMS       Past Surgical History:   Procedure Laterality Date     COLONOSCOPY N/A 9/3/2019    Procedure: Colonoscopy, With Polypectomy And Biopsy;  Surgeon: Radha Neri DO;  Location: [facility]     COLONOSCOPY WITH CO2 INSUFFLATION N/A 9/3/2019    Procedure: COLONOSCOPY, WITH CO2 INSUFFLATION;  Surgeon: Radha Neri DO;  Location: [facility]     OB History    Para Term  AB Living   2 0 0 0 0 2   SAB TAB Ectopic Multiple Live Births   0 0 0 0 2      # Outcome Date GA Lbr Hardy/2nd Weight Sex Delivery Anes PTL Lv   2      F    MALATHI   1  " 2010        MALATHI       Review of Systems   Constitutional: Negative for chills and fever.   HENT: Negative for congestion, ear pain, hearing loss and sore throat.    Eyes: Negative for pain and visual disturbance.   Respiratory: Negative for cough and shortness of breath.    Cardiovascular: Positive for palpitations. Negative for chest pain and peripheral edema.   Gastrointestinal: Positive for hematochezia. Negative for abdominal pain, constipation, diarrhea, heartburn and nausea.   Breasts:  Positive for breast mass. Negative for tenderness and discharge.   Genitourinary: Negative for dysuria, frequency, genital sores, hematuria, pelvic pain, urgency, vaginal bleeding and vaginal discharge.   Musculoskeletal: Negative for arthralgias, joint swelling and myalgias.   Skin: Negative for rash.   Neurological: Negative for dizziness, weakness, headaches and paresthesias.   Psychiatric/Behavioral: Negative for mood changes. The patient is nervous/anxious.           OBJECTIVE:   /75 (BP Location: Left arm, Patient Position: Chair, Cuff Size: Adult Regular)   Pulse 74   Temp 98.6  F (37  C) (Tympanic)   Ht 1.613 m (5' 3.5\")   Wt 53.3 kg (117 lb 6.4 oz)   LMP 2019 (Exact Date)   SpO2 95%   Breastfeeding? No   BMI 20.47 kg/m    Physical Exam  GENERAL: healthy, alert and no distress  EYES: Eyes grossly normal to inspection, PERRL and conjunctivae and sclerae normal  HENT: ear canals and TM's normal, nose and mouth without ulcers or lesions  NECK: no adenopathy, no asymmetry, masses, or scars and thyroid normal to palpation  RESP: lungs clear to auscultation - no rales, rhonchi or wheezes  BREAST: normal without masses, tenderness or nipple discharge and no palpable axillary masses or adenopathy  CV: regular rate and rhythm, normal S1 S2, no S3 or S4, no murmur, click or rub, no peripheral edema and peripheral pulses strong  ABDOMEN: soft, nontender, no hepatosplenomegaly, no masses and bowel " "sounds normal  MS: no gross musculoskeletal defects noted, no edema; TTP over right piriformis  SKIN: multiple benign-appearing nevi  NEURO: Normal strength and tone, mentation intact and speech normal  PSYCH: mentation appears normal, affect normal/bright    Diagnostic Test Results:  Labs reviewed in Epic  No results found for this or any previous visit (from the past 24 hour(s)).    ASSESSMENT/PLAN:   1. Routine general medical examination at a health care facility    2. Abnormal colonoscopy  Following with GI.  See notes.        3. Elevated AST (SGOT)  Rechecking today.   - Tissue transglutaminase esperanza IgA and IgG  - Hepatitis Antibody A IgG  - Hepatic panel (Albumin, ALT, AST, Bili, Alk Phos, TP)    4. Rectal bleeding  Resolved now.  Following with GI.      5. Abnormal TSH  - TSH with free T4 reflex    6. Piriformis syndrome, right  - [facility], AND CHIROPRACTIC REFERRAL; Future    7. Pain of left lower extremity  Along IT band    8. Multiple nevi  - SKIN CARE REFERRAL    9. CARDIOVASCULAR SCREENING; LDL GOAL LESS THAN 130  - Lipid panel reflex to direct LDL Fasting    10. Screening for diabetes mellitus  - Glucose, whole blood    COUNSELING:  Reviewed preventive health counseling, as reflected in patient instructions       Regular exercise       Healthy diet/nutrition       Contraception    Estimated body mass index is 20.47 kg/m  as calculated from the following:    Height as of this encounter: 1.613 m (5' 3.5\").    Weight as of this encounter: 53.3 kg (117 lb 6.4 oz).         reports that she has never smoked. She has never used smokeless tobacco.      Counseling Resources:  ATP IV Guidelines  Pooled Cohorts Equation Calculator  Breast Cancer Risk Calculator  FRAX Risk Assessment  ICSI Preventive Guidelines  Dietary Guidelines for Americans, 2010  [employer] MyPlate  ASA Prophylaxis  Lung CA Screening    La Scott, MARCY CNP  [facility]  "

## 2019-09-17 NOTE — PROGRESS NOTES
"   SUBJECTIVE:   CC: Marjorie Lee is an 34 year old woman who presents for preventive health visit.     Healthy Habits:    Do you get at least three servings of calcium containing foods daily (dairy, green leafy vegetables, etc.)? { :872469::\"yes\"}    Amount of exercise or daily activities, outside of work: { :581568}    Problems taking medications regularly { :648295::\"No\"}    Medication side effects: { :583736::\"No\"}    Have you had an eye exam in the past two years? { :429126}    Do you see a dentist twice per year? { :837009}    Do you have sleep apnea, excessive snoring or daytime drowsiness?{ :611040}  {Outside tests to abstract? :532574}    {additional problems to add (Optional):078030}    Today's PHQ-2 Score:   PHQ-2 ( 1999 Pfizer) 9/14/2019 6/19/2019   Q1: Little interest or pleasure in doing things 0 0   Q2: Feeling down, depressed or hopeless 0 0   PHQ-2 Score 0 0   Q1: Little interest or pleasure in doing things Not at all -   Q2: Feeling down, depressed or hopeless Not at all -   PHQ-2 Score 0 -     {PHQ-2 LOOK IN ASSESSMENTS (Optional) :129116}  Abuse: Current or Past(Physical, Sexual or Emotional)- {YES/NO/NA:654204}  Do you feel safe in your environment? {YES/NO/NA:798708}    Social History     Tobacco Use     Smoking status: Never Smoker     Smokeless tobacco: Never Used   Substance Use Topics     Alcohol use: Yes     Comment: occ     If you drink alcohol do you typically have >3 drinks per day or >7 drinks per week? {ETOH :098039}                     Reviewed orders with patient.  Reviewed health maintenance and updated orders accordingly - {Yes/No:009001::\"Yes\"}  {Chronicprobdata (Optional):717694}    {Mammo Decision Support (Optional):059347}    Pertinent mammograms are reviewed under the imaging tab.  History of abnormal Pap smear: {PAP HX:495140}     Reviewed and updated as needed this visit by clinical staff  Allergies  Meds         Reviewed and updated as needed this visit by Provider     " "   {HISTORY OPTIONS (Optional):611001}    ROS:  { :993874}    OBJECTIVE:   LMP 08/27/2019   EXAM:  {Exam Choices:392353}    {Diagnostic Test Results (Optional):388193::\"Diagnostic Test Results:\",\"Labs reviewed in Epic\"}    ASSESSMENT/PLAN:   {Diag Picklist:540856}    COUNSELING:   {FEMALE COUNSELING MESSAGES:078368::\"Reviewed preventive health counseling, as reflected in patient instructions\"}    Estimated body mass index is 20.7 kg/m  as calculated from the following:    Height as of 7/1/19: 1.613 m (5' 3.5\").    Weight as of 7/1/19: 53.8 kg (118 lb 11.2 oz).    {Weight Management Plan (ACO) Complete if BMI is abnormal-  Ages 18-64  BMI >24.9.  Age 65+ with BMI <23 or >30 (Optional):002798}     reports that she has never smoked. She has never used smokeless tobacco.  {Tobacco Cessation -- Complete if patient is a smoker (Optional):379652}    Counseling Resources:  ATP IV Guidelines  Pooled Cohorts Equation Calculator  Breast Cancer Risk Calculator  FRAX Risk Assessment  ICSI Preventive Guidelines  Dietary Guidelines for Americans, 2010  USDA's MyPlate  ASA Prophylaxis  Lung CA Screening    MARCY Carlin [facility]  "

## 2019-09-18 LAB
HAV IGG SER QL IA: REACTIVE
TTG IGA SER-ACNC: <1 U/ML
TTG IGG SER-ACNC: <1 U/ML

## 2019-09-24 ENCOUNTER — OFFICE VISIT (OUTPATIENT)
Dept: GASTROENTEROLOGY | Facility: CLINIC | Age: 34
End: 2019-09-24
Payer: COMMERCIAL

## 2019-09-24 VITALS
OXYGEN SATURATION: 96 % | WEIGHT: 115.4 LBS | BODY MASS INDEX: 19.7 KG/M2 | SYSTOLIC BLOOD PRESSURE: 108 MMHG | DIASTOLIC BLOOD PRESSURE: 67 MMHG | HEART RATE: 76 BPM | HEIGHT: 64 IN

## 2019-09-24 DIAGNOSIS — R93.3 ABNORMAL COLONOSCOPY: ICD-10-CM

## 2019-09-24 DIAGNOSIS — R19.7 DIARRHEA, UNSPECIFIED TYPE: Primary | ICD-10-CM

## 2019-09-24 PROCEDURE — 99214 OFFICE O/P EST MOD 30 MIN: CPT | Performed by: PHYSICIAN ASSISTANT

## 2019-09-24 ASSESSMENT — MIFFLIN-ST. JEOR: SCORE: 1190.51

## 2019-09-24 ASSESSMENT — PAIN SCALES - GENERAL: PAINLEVEL: NO PAIN (0)

## 2019-09-24 NOTE — NURSING NOTE
"Marjorie Lee's goals for this visit include:   Chief Complaint   Patient presents with     Consult     Follow up; Patient reports symptoms have improved; Had abdominal pain last week when provider palpitated her abdomen, but pain has subsided; patient denies bloody stools       She requests these members of her care team be copied on today's visit information: PCP    PCP: La Scott    Referring Provider:  La Scott, APRN CNP  [address] [place] [address]    /67 (BP Location: Left arm, Patient Position: Sitting, Cuff Size: Adult Regular)   Pulse 76   Ht 1.613 m (5' 3.5\")   Wt 52.3 kg (115 lb 6.4 oz)   LMP 08/27/2019 (Exact Date)   SpO2 96%   BMI 20.12 kg/m      Do you need any medication refills at today's visit? No    Willa Amezquita, JANY        "

## 2019-09-24 NOTE — PROGRESS NOTES
GASTROENTEROLOGY FOLLOW UP CLINIC VISIT    CC/REFERRING MD:    La Scott    REASON FOR CONSULTATION:   Referred by La Scott for Consult (Follow up; Patient reports symptoms have improved; Had abdominal pain last week when provider palpitated her abdomen, but pain has subsided; patient denies bloody stools)      HISTORY OF PRESENT ILLNESS:    Marjorie Lee is 34 year old female who presents for follow up. She was initially seen 2 months ago in July 2019 for about 12 episodes of bloody diarrhea in the proceeding months. Symptoms always occurred after an aggressive run. She is athletic and is a runner but she admits to training more aggressively earlier this year. Training sessions would be followed by bloody diarrhea, abdominal cramping fecal urgency. After several hours of 4-5 bloody diarrheal episodes symptoms would eventually dissipate. She has taken perhaps one ibuprofen during one of these episodes over 2 months ago which she notes exacerbated her symptoms. She has not taken any NSAID's since that one dose.     Last episode of bloody diarrhea was before our initial office visit. She has not had any more reoccurrences since reducing her aggressive training. She does continue to exercise and run and is without symptoms when doing so. She does have some loose stools, but no abdominal pain and no rectal bleeding. She was evaluated recently with a colonoscopy. Noted to have tortuous colon and multiple ulcers in rectum, sigmoid colon, descending colon and ascending colon. Scope unable to pass hepatic flexure due to significant looping and patient discomfort. Biopsies are consistent with acute inflammation either from nsaid injury vs infection. She however as mentioned has only taken NSAID once and then discontinued. Symptoms were more concerning for an ischemic colitis. Follow up with CT abd/pelvis revealed patent central abdominopelvic arteries.      Family history is significant for maternal grandmother with colon cancer diagnosed in her late 70's. Both of her parents have history of pre cancerous polyps. No family hx of IBD.         PREVIOUS ENDOSCOPY:    Colonoscopy 9/3/2019  Impression:       - Multiple ulcers in the rectum, in the sigmoid colon, in the descending colon and in the ascending colon. Biopsied.                             - Tortuous colon with signifcant looping and patient discomfort. Extent reached was hepatic flexure despite increasing sedation, position changes and manual pressure.     SPECIMEN(S):   A: Colon biopsy, ascending   B: Colon biopsy, descending   C: Rectal biopsy     FINAL DIAGNOSIS:   A. COLON BIOPSY, ASCENDING:   - Colonic mucosa with focal active inflammation   - Negative for significant crypt glandular distortion, pseudo-pyloric gland metaplasia, granuloma formation and dysplasia     B. COLON BIOPSY, DESCENDING:   - Colonic mucosa with focal active inflammation   - Negative for significant crypt glandular distortion, pseudo-pyloric gland or Paneth metaplasia, granuloma formation and dysplasia     C. RECTAL BIOPSY:   - Colonic mucosa with focal active inflammation   - Negative for significant crypt glandular distortion, pseudo-pyloric gland or Paneth metaplasia, granuloma formation and dysplasia     COMMENT:     The acute inflammation is patchy and differential includes drug effect (such as NSAIDs) versus infection.   Crohn's disease is less likely as there is no evidence of chronicity (no pseudo-pyloric gland metaplasia or   Paneth cell metaplasia on the left side).  Dr. ANDRE Garcia has reviewed parts A, B and concurs.     I have personally reviewed all specimens and/or slides, including the listed special stains, and used them   with my medical judgement to determine or confirm the final diagnosis.     Electronically signed out by:     Trino Henry M.D., [facility]         PERTINENT PAST MEDICAL HISTORY:    Past  "Medical History:   Diagnosis Date     NO ACTIVE PROBLEMS        PREVIOUS SURGERIES:   Past Surgical History:   Procedure Laterality Date     COLONOSCOPY N/A 9/3/2019    Procedure: Colonoscopy, With Polypectomy And Biopsy;  Surgeon: Radha Neri DO;  Location: [facility]     COLONOSCOPY WITH CO2 INSUFFLATION N/A 9/3/2019    Procedure: COLONOSCOPY, WITH CO2 INSUFFLATION;  Surgeon: Radha Neri DO;  Location: [facility]       ALLERGIES:   No Known Allergies    PERTINENT MEDICATIONS:    Current Outpatient Medications:      Multiple Vitamins-Minerals (MULTIVITAMIN PO), Take 1 tablet by mouth daily , Disp: , Rfl:      paragard intrauterine copper, 1 each by Intrauterine route once for 1 dose, Disp: 1 each, Rfl:     FAMILY HISTORY:   Family History   Problem Relation Age of Onset     Osteoarthritis Mother      Osteoarthritis Father      Breast Cancer Maternal Aunt      Other - See Comments Paternal Uncle         glioblastoma     Other - See Comments Paternal Uncle         glioblastoma     Colon Cancer Maternal Grandmother           ROS:    No fevers or chills  No weight loss  No blurry vision, double vision or change in vision  No sore throat  No lymphadenopathy  No headache, paraesthesias, or weakness in a limb  No shortness of breath or wheezing  No chest pain or pressure  No arthralgias or myalgias  No rashes or skin changes  No odynophagia or dysphagia  No BRBPR, hematochezia, melena  No dysuria, frequency or urgency  No hot/cold intolerance or polyria  No anxiety or depression  PHYSICAL EXAMINATION:  Constitutional: aaox3, cooperative, pleasant, not dyspneic/diaphoretic, no acute distress  Vitals reviewed: /67 (BP Location: Left arm, Patient Position: Sitting, Cuff Size: Adult Regular)   Pulse 76   Ht 1.613 m (5' 3.5\")   Wt 52.3 kg (115 lb 6.4 oz)   LMP 08/27/2019 (Exact Date)   SpO2 96%   BMI 20.12 kg/m     Wt:   Wt Readings from Last 2 Encounters:   09/24/19 52.3 kg (115 lb 6.4 oz)   09/17/19 53.3 kg " (117 lb 6.4 oz)          Eyes: Sclera anicteric/injected  Ears/nose/mouth/throat: Normal oropharynx without ulcers or exudate, mucus membranes moist, hearing intact  Neck: supple, thyroid normal size  CV: No edema  Respiratory: Unlabored breathing  Abd:Nondistended, no masses, +bs, no hepatosplenomegaly, nontender, no peritoneal signs  Skin: warm, perfused, no jaundice  Psych: Normal affect  MSK: Normal gait      PERTINENT STUDIES:   Most recent CBC:  Recent Labs   Lab Test 07/01/19 0818   WBC 6.3   HGB 13.8   HCT 41.9        Most recent hepatic panel:  Recent Labs   Lab Test 09/17/19  1041 07/01/19 0818   ALT 28 39   AST 21 70*     Most recent creatinine:  Recent Labs   Lab Test 07/01/19 0818   CR 0.72       RADIOLOGY:      Examination: CT ABDOMEN PELVIS W CONTRAST, 9/10/2019 11:35 AM     Comparison: None.     History: Diarrhea; GI bleed; recurrent bloody diarrhea with exertion -  c-scope with ulcers throughout colon (L >R).  Please remark on patency  of blood vessels.; Hematochezia; Diarrhea, unspecified type.     Technique: Volumetric helical acquisition of CT images from the lung  bases through the symphysis pubis after the uneventful administration  of IV contrast.  Contrast dose: iso roro 370 73ml  Total DLP: 307 mGy*cm.     Findings:  Lower chest:   Minimal bibasilar, dependent, subpleural reticular/ground glass  attenuation, consistent with atelectasis. No focal consolidation. No  pneumothorax or pleural effusion. No suspicious nodules or masses.      The heart size is normal. No pericardial effusion.     Abdomen:  Patchy, scattered areas of colonic mucosal edema and thickening. No  dilated loops of bowel. Prominent mucosal thickening and edema of the  colon at the hepatic flexure with luminal narrowing. Haziness,  adenopathy and fat stranding along the right colon. Normal appendix.     The liver, spleen, gallbladder, pancreas, and adrenal glands appear  within normal limits. Mild central  intrahepatic biliary dilatation.  The right kidney demonstrates too small to characterize hypodensity  and cortical sulci (series 2 image 30), likely cyst.  No pelvic  masses. Intrauterine device in appropriate position. The urinary  bladder is distended but otherwise appears within normal limits.      Aorta and central major abdominal arteries are patent and nondilated.  Prominent IVC and hepatic veins.     Bones and soft tissues:   No suspicious lytic or blastic osseous lesions. Trace anasarca.                                                                      Impression:  1.  Evidence of inflammation involving the hepatic flexure colon, which is edematous and mildly thickened with prominent, likely reactive, regional mesenteric lymph nodes. Findings are favored to represent infectious or inflammatory colitis. Sequela of prior ischemia remains a consideration, given history. No pneumatosis or portal venous gas.  2.  Patent central abdominopelvic arteries. This study is not tailored to assess distal arterial patency.     I have personally reviewed the examination and initial interpretation and I agree with the findings.     MADDISON FARRIS, DO        ASSESSMENT/PLAN:    Marjorie Lee is a 34 year old female who presents for follow up today. Initially seen for bloody diarrhea with exertion, concerning for ischemic colitis. Symptoms have resolved since avoiding aggressive activity however she does continue to exercise daily and regularly. Recent colonoscopy with multiple ulcers consistent with acute issue thought to be NSAID related or infection. Unlikely to be crohn's disease. Given her history, more consistent with ischemic colitis. CT abd/pelvis also done recently to evaluate patency of abdominal arteries. Central adominopelvic arteries patent. She was noted to have inflammation along hepatic flexure of colon which is thought to be either infectious, inflammatory or sequale of prior ischemia. She is not having  rectal bleeding at this time. Biopsies are not consistent with crohn's disease. Since she continues to have loose stools, will check stool studies to r/o undlrying infectious etiology. Otherwise again, likely to be related to ischemic colitis. If any abdominal pain or rectal bleeding returns, would have low yield to repeat colonoscopy. Recommended she avoid aggressive activity. Continue to avoid NSAIDs. We should repeat colonoscopy in about 6 months to check for healing and to assess ileum which was not able to be evaluated during initial colonoscopy.     Follow up in 6 months, sooner if needed.     Diarrhea, unspecified type  Abnormal colonoscopy      Thank you for this consultation.  It was a pleasure to participate in the care of this patient; please contact us with any further questions.  A total of 25 minutes, face to face, was spent with this patient, >50% of which was counseling regarding the above delineated issues.    This note was created with voice recognition software, and while reviewed for accuracy, typos may remain.     Cassy Young PA-C  Gastroenterology   [facility]

## 2019-09-24 NOTE — Clinical Note
Do you think this patient should have a repeat colonoscopy, she is asymptomatic besides looser stools. She continues to have evidence of inflammation on CT scan however after reducing aggressive training/running. She does still exercise regularly so unsure if this is contributing. Leaning towards advising repeat in 6 months to make sure no chronicity. Thoughts? Thanks!

## 2019-09-24 NOTE — PATIENT INSTRUCTIONS
Please submit stool samples when completed.     Report any returning symptoms such as abdominal pain, worsening diarrhea or rectal bleeding.     Follow up as needed.

## 2019-09-26 DIAGNOSIS — R19.7 DIARRHEA, UNSPECIFIED TYPE: ICD-10-CM

## 2019-09-26 LAB
C DIFF TOX B STL QL: NEGATIVE
SPECIMEN SOURCE: NORMAL

## 2019-09-26 PROCEDURE — 2894A VOIDCORRECT: CPT | Performed by: PHYSICIAN ASSISTANT

## 2019-09-26 PROCEDURE — 87209 SMEAR COMPLEX STAIN: CPT | Performed by: PHYSICIAN ASSISTANT

## 2019-09-26 PROCEDURE — 87506 IADNA-DNA/RNA PROBE TQ 6-11: CPT | Performed by: PHYSICIAN ASSISTANT

## 2019-09-26 PROCEDURE — 87493 C DIFF AMPLIFIED PROBE: CPT | Mod: 59 | Performed by: PHYSICIAN ASSISTANT

## 2019-09-26 PROCEDURE — 87329 GIARDIA AG IA: CPT | Mod: 59 | Performed by: PHYSICIAN ASSISTANT

## 2019-09-26 PROCEDURE — 87177 OVA AND PARASITES SMEARS: CPT | Performed by: PHYSICIAN ASSISTANT

## 2019-09-26 PROCEDURE — 87328 CRYPTOSPORIDIUM AG IA: CPT | Performed by: PHYSICIAN ASSISTANT

## 2019-09-27 LAB
C COLI+JEJUNI+LARI FUSA STL QL NAA+PROBE: NOT DETECTED
C PARVUM AG STL QL IA: NEGATIVE
EC STX1 GENE STL QL NAA+PROBE: NOT DETECTED
EC STX2 GENE STL QL NAA+PROBE: NOT DETECTED
ENTERIC PATHOGEN COMMENT: NORMAL
G LAMBLIA AG STL QL IA: NEGATIVE
NOROV GI+II ORF1-ORF2 JNC STL QL NAA+PR: NOT DETECTED
O+P STL MICRO: NORMAL
O+P STL MICRO: NORMAL
RVA NSP5 STL QL NAA+PROBE: NOT DETECTED
SALMONELLA SP RPOD STL QL NAA+PROBE: NOT DETECTED
SHIGELLA SP+EIEC IPAH STL QL NAA+PROBE: NOT DETECTED
SPECIMEN SOURCE: NORMAL
SPECIMEN SOURCE: NORMAL
V CHOL+PARA RFBL+TRKH+TNAA STL QL NAA+PR: NOT DETECTED
Y ENTERO RECN STL QL NAA+PROBE: NOT DETECTED

## 2019-10-15 ENCOUNTER — OFFICE VISIT (OUTPATIENT)
Dept: FAMILY MEDICINE | Facility: CLINIC | Age: 34
End: 2019-10-15
Payer: COMMERCIAL

## 2019-10-15 VITALS — DIASTOLIC BLOOD PRESSURE: 64 MMHG | SYSTOLIC BLOOD PRESSURE: 110 MMHG

## 2019-10-15 DIAGNOSIS — Z12.83 SKIN CANCER SCREENING: Primary | ICD-10-CM

## 2019-10-15 DIAGNOSIS — D22.9 MULTIPLE BENIGN MELANOCYTIC NEVI: ICD-10-CM

## 2019-10-15 PROCEDURE — 99202 OFFICE O/P NEW SF 15 MIN: CPT | Performed by: FAMILY MEDICINE

## 2019-10-15 NOTE — PATIENT INSTRUCTIONS
"FUTURE APPOINTMENTS  Follow up in 3 year(s) for a full-body skin cancer screening.    SUN PROTECTION INSTRUCTIONS  Sun damage can lead to skin cancer and premature aging of the skin.      The best way to protect from sun damage to your skin is to avoid the sun during peak hours (10 am - 2 pm) even on overcast days.    Never use tanning beds. Tanning beds are associated with much higher risks of skin cancer.    All tanning damages the skin. Aim for ivory skin year round and you will have less trouble with your skin in years to come. There is no merit in getting \"a base tan\" before a warm weather vacation, as any tanning indicates your body's response to sun damage.    Stop smoking. Smokers have higher rates of skin cancer and also have premature skin wrinkling.    Use UPF sun-protective clothing, which while more expensive initially provides longer lasting coverage without having to worry about remembering to re-apply.  1. Wear a wide-brimmed hat and sunglasses.   2. Wear sun-protective clothing.  [employer] and other [employer] make sun protective clothing that are stylish, comfortable and cool.   [employer] and other [employer] make UV arm sleeves suitable for golfing, gardening and other activities.    Sunscreen instructions:  1. Use sunscreens with Zinc Oxide, Titanium Dioxide or Avobenzone to protect from UVA rays.  2. Use SPF 30-50+ to protect from UVB rays.  3. Re-apply every 2 hours even if water resistant.  4. Apply on your face every day even when cloudy and even in the winter. UVA \"aging rays\" penetrate window glass and are just as strong in the winter as in the summer.    FYI  You should use about 3 tablespoons of sunscreen to protect your whole body. Thus a typical eight ounce bottle of sunscreen should last 4 applications. Remember, that the SPF rating is compromised if you don t apply enough. Most people only apply 1/2 - 1/3 of the amount they need. Also don t forget areas such as your ears, " feet, upper back and harder to reach places. Keep in mind that these amounts should be increased for larger body sizes.    Sunscreens with titanium dioxide and/or zinc oxide in the active ingredients are physical blockers as opposed to chemical blockers. Chemical-free sunscreens should not irritate the skin.    Spray-on sunscreens may be used for touch-up application only, not as a base layer. Also, use with caution around small children due to inhalation risk.    SPF means sun protection factor, which is just the degree to which the sunscreen can protect against UVB rays. There is no rating system for UVA rays. SPF is calculated as the time skin will burn when sunscreen is applied vs. skin without sunscreen.    Water resistant sunscreens should be re-applied every 1-2 hours.    Product Recommendations:    Consider use of sunscreen sticks with Zinc Oxide and Titanium Dioxide active ingredients such as Neutrogena Pure&Free Baby Sunscreen Stick.    Good examples include: Blue Lizard, EltaMD, Solbar    Good daily moisturizers with SPF: Vanicream, CeraVe.    For sensitive skin, consider : SkinMedica Essential Defense Mineral Shield Broad Spectrum SPF 35    Men: consider use of Neutrogena Triple Protect Facial Lotion    Avoid retinyl palmitate products.  Avoid combination products that include both sunscreen and insect repellant, as sunscreen should be applied every 2 hours, but insect repellant should not be applied as frequently.    For more information:  https://www.skincancer.org/prevention/sun-protection/sunscreen/sunscreens-safe-and-effective

## 2019-10-15 NOTE — PROGRESS NOTES
[facility] - PRIMARY CARE SKIN    CC: skin cancer screening (full-body)  SUBJECTIVE:   Marjorie Lee is a(n) 34 year old female who presents to clinic today for a full-body skin exam.    Bothersome lesions noticed by the patient or other skin concerns :  Issue One: Lesions on the back.  Issue Two: A rough spot on the left side of the face    Personal Medical History  Skin cancer: NO  Other: ?rosacea    Family Medical History  Skin cancer: YES - basal cell carcinoma in MGF  Eczema Psoriasis Autoimmune   YES - in son NO NO     Sun Exposure History  Previous history of significant sun exposure:  Blistering sunburns: YES.  Tanning beds: NO.  Sunscreen usage: YES, SPF: 30.    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    Refer to electronic medical record (EMR) for past medical history and medications.    INTEGUMENTARY/SKIN: POSITIVE for changing lesion  ROS: 14 point review of systems was negative except the symptoms listed above in the HPI.    This document serves as a record of the services and decisions personally performed and made by Shahnaz Moreno MD and was created by Ryan Roach, a trained medical scribe, based on personal observations and provider statements to the medical scribe.  October 15, 2019 10:24 AM   Ryan Roach    OBJECTIVE:   GENERAL: healthy, alert and no distress.  SKIN: [place] Skin Type - II.  This patient was examined from the top of the head to the bottom of the feet  including scalp, face, neck, trunk, buttocks, both arms, both legs, both hands, both feet, and all fingers and toes. The dermatoscope was used to help evaluate pigmented lesions.  Skin Pertinent Findings:  Face: erythema of the cheeks, some papules.    Upper extremities, lower extremities, trunk: Scattered brown macules of various sizes and shapes most consistent with (benign) melanocytic nevi.    Right dorsal third toe: 2 mm in size brown macule(s) most consistent with benign nevus(i).    ASSESSMENT:  "    Encounter Diagnoses   Name Primary?     Skin cancer screening Yes     Multiple benign melanocytic nevi      MDM: . Facial symptoms may represent an early rosacea. The patient defers treatment at this time.    PLAN:   Patient Instructions   FUTURE APPOINTMENTS  Follow up in 3 year(s) for a full-body skin cancer screening.    SUN PROTECTION INSTRUCTIONS  Sun damage can lead to skin cancer and premature aging of the skin.      The best way to protect from sun damage to your skin is to avoid the sun during peak hours (10 am - 2 pm) even on overcast days.    Never use tanning beds. Tanning beds are associated with much higher risks of skin cancer.    All tanning damages the skin. Aim for ivory skin year round and you will have less trouble with your skin in years to come. There is no merit in getting \"a base tan\" before a warm weather vacation, as any tanning indicates your body's response to sun damage.    Stop smoking. Smokers have higher rates of skin cancer and also have premature skin wrinkling.    Use UPF sun-protective clothing, which while more expensive initially provides longer lasting coverage without having to worry about remembering to re-apply.  1. Wear a wide-brimmed hat and sunglasses.   2. Wear sun-protective clothing.  [employer] and other [employer] make sun protective clothing that are stylish, comfortable and cool.   [facility] and other [employer] LS[employer] make UV arm sleeves suitable for golfing, gardening and other activities.    Sunscreen instructions:  1. Use sunscreens with Zinc Oxide, Titanium Dioxide or Avobenzone to protect from UVA rays.  2. Use SPF 30-50+ to protect from UVB rays.  3. Re-apply every 2 hours even if water resistant.  4. Apply on your face every day even when cloudy and even in the winter. UVA \"aging rays\" penetrate window glass and are just as strong in the winter as in the summer.    FYI  You should use about 3 tablespoons of sunscreen to protect your whole body. Thus " a typical eight ounce bottle of sunscreen should last 4 applications. Remember, that the SPF rating is compromised if you don t apply enough. Most people only apply 1/2 - 1/3 of the amount they need. Also don t forget areas such as your ears, feet, upper back and harder to reach places. Keep in mind that these amounts should be increased for larger body sizes.    Sunscreens with titanium dioxide and/or zinc oxide in the active ingredients are physical blockers as opposed to chemical blockers. Chemical-free sunscreens should not irritate the skin.    Spray-on sunscreens may be used for touch-up application only, not as a base layer. Also, use with caution around small children due to inhalation risk.    SPF means sun protection factor, which is just the degree to which the sunscreen can protect against UVB rays. There is no rating system for UVA rays. SPF is calculated as the time skin will burn when sunscreen is applied vs. skin without sunscreen.    Water resistant sunscreens should be re-applied every 1-2 hours.    Product Recommendations:    Consider use of sunscreen sticks with Zinc Oxide and Titanium Dioxide active ingredients such as Neutrogena Pure&Free Baby Sunscreen Stick.    Good examples include: Blue Lizard, EltaMD, Solbar    Good daily moisturizers with SPF: Vanicream, CeraVe.    For sensitive skin, consider : SkinMedica Essential Defense Mineral Shield Broad Spectrum SPF 35    Men: consider use of Neutrogena Triple Protect Facial Lotion    Avoid retinyl palmitate products.  Avoid combination products that include both sunscreen and insect repellant, as sunscreen should be applied every 2 hours, but insect repellant should not be applied as frequently.    For more information:  https://www.skincancer.org/prevention/sun-protection/sunscreen/sunscreens-safe-and-effective      The patient was counseled about sunscreens and sun avoidance. The patient was counseled to check the skin regularly and report any  lesion that is new, changing, itching, scabbing, bleeding or otherwise bothersome. The patient was discharged ambulatory and in stable condition.    Educational brochures given to patient: skin cancer.    TT: 25 minutes.  CT: 15 minutes.    The information in this document, created by the medical scribe for me, accurately reflects the services I personally performed and the decisions made by me. I have reviewed and approved this document for accuracy prior to leaving the patient care area.  October 15, 2019 10:24 AM  Shahnaz Moreno MD  [facility] [place]

## 2019-10-15 NOTE — LETTER
10/15/2019         RE: Marjorie Lee  [address]  [place] [address]        Dear Colleague,    Thank you for referring your patient, Marjorie Lee, to the [facility]. Please see a copy of my visit note below.    [facility] - PRIMARY CARE SKIN    CC: skin cancer screening (full-body)  SUBJECTIVE:   Marjorie Lee is a(n) 34 year old female who presents to clinic today for a full-body skin exam.    Bothersome lesions noticed by the patient or other skin concerns :  Issue One: Lesions on the back.  Issue Two: A rough spot on the left side of the face    Personal Medical History  Skin cancer: NO  Other: ?rosacea    Family Medical History  Skin cancer: YES - basal cell carcinoma in MGF  Eczema Psoriasis Autoimmune   YES - in son NO NO     Sun Exposure History  Previous history of significant sun exposure:  Blistering sunburns: YES.  Tanning beds: NO.  Sunscreen usage: YES, SPF: 30.    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    Refer to electronic medical record (EMR) for past medical history and medications.    INTEGUMENTARY/SKIN: POSITIVE for changing lesion  ROS: 14 point review of systems was negative except the symptoms listed above in the HPI.    This document serves as a record of the services and decisions personally performed and made by Shahnaz Moreno MD and was created by Ryan Roach, a trained medical scribe, based on personal observations and provider statements to the medical scribe.  October 15, 2019 10:24 AM   Ryan Roach    OBJECTIVE:   GENERAL: healthy, alert and no distress.  SKIN: Cas[place] Skin Type - II.  This patient was examined from the top of the head to the bottom of the feet  including scalp, face, neck, trunk, buttocks, both arms, both legs, both hands, both feet, and all fingers and toes. The dermatoscope was used to help evaluate pigmented lesions.  Skin Pertinent Findings:  Face: erythema of the cheeks, some papules.    Upper  "extremities, lower extremities, trunk: Scattered brown macules of various sizes and shapes most consistent with (benign) melanocytic nevi.    Right dorsal third toe: 2 mm in size brown macule(s) most consistent with benign nevus(i).    ASSESSMENT:     Encounter Diagnoses   Name Primary?     Skin cancer screening Yes     Multiple benign melanocytic nevi      MDM: . Facial symptoms may represent an early rosacea. The patient defers treatment at this time.    PLAN:   Patient Instructions   FUTURE APPOINTMENTS  Follow up in 3 year(s) for a full-body skin cancer screening.    SUN PROTECTION INSTRUCTIONS  Sun damage can lead to skin cancer and premature aging of the skin.      The best way to protect from sun damage to your skin is to avoid the sun during peak hours (10 am - 2 pm) even on overcast days.    Never use tanning beds. Tanning beds are associated with much higher risks of skin cancer.    All tanning damages the skin. Aim for ivory skin year round and you will have less trouble with your skin in years to come. There is no merit in getting \"a base tan\" before a warm weather vacation, as any tanning indicates your body's response to sun damage.    Stop smoking. Smokers have higher rates of skin cancer and also have premature skin wrinkling.    Use UPF sun-protective clothing, which while more expensive initially provides longer lasting coverage without having to worry about remembering to re-apply.  1. Wear a wide-brimmed hat and sunglasses.   2. Wear sun-protective clothing.  [employer] and other [employer] make sun protective clothing that are stylish, comfortable and cool.   [employer] and other [employer] make UV arm sleeves suitable for golfing, gardening and other activities.    Sunscreen instructions:  1. Use sunscreens with Zinc Oxide, Titanium Dioxide or Avobenzone to protect from UVA rays.  2. Use SPF 30-50+ to protect from UVB rays.  3. Re-apply every 2 hours even if water resistant.  4. Apply " "on your face every day even when cloudy and even in the winter. UVA \"aging rays\" penetrate window glass and are just as strong in the winter as in the summer.    FYI  You should use about 3 tablespoons of sunscreen to protect your whole body. Thus a typical eight ounce bottle of sunscreen should last 4 applications. Remember, that the SPF rating is compromised if you don t apply enough. Most people only apply 1/2 - 1/3 of the amount they need. Also don t forget areas such as your ears, feet, upper back and harder to reach places. Keep in mind that these amounts should be increased for larger body sizes.    Sunscreens with titanium dioxide and/or zinc oxide in the active ingredients are physical blockers as opposed to chemical blockers. Chemical-free sunscreens should not irritate the skin.    Spray-on sunscreens may be used for touch-up application only, not as a base layer. Also, use with caution around small children due to inhalation risk.    SPF means sun protection factor, which is just the degree to which the sunscreen can protect against UVB rays. There is no rating system for UVA rays. SPF is calculated as the time skin will burn when sunscreen is applied vs. skin without sunscreen.    Water resistant sunscreens should be re-applied every 1-2 hours.    Product Recommendations:    Consider use of sunscreen sticks with Zinc Oxide and Titanium Dioxide active ingredients such as Neutrogena Pure&Free Baby Sunscreen Stick.    Good examples include: Blue Lizard, EltaMD, Solbar    Good daily moisturizers with SPF: Vanicream, CeraVe.    For sensitive skin, consider : SkinMedica Essential Defense Mineral Shield Broad Spectrum SPF 35    Men: consider use of Neutrogena Triple Protect Facial Lotion    Avoid retinyl palmitate products.  Avoid combination products that include both sunscreen and insect repellant, as sunscreen should be applied every 2 hours, but insect repellant should not be applied as frequently.    For " more information:  https://www.skincancer.org/prevention/sun-protection/sunscreen/sunscreens-safe-and-effective      The patient was counseled about sunscreens and sun avoidance. The patient was counseled to check the skin regularly and report any lesion that is new, changing, itching, scabbing, bleeding or otherwise bothersome. The patient was discharged ambulatory and in stable condition.    Educational brochures given to patient: skin cancer.    TT: 25 minutes.  CT: 15 minutes.    The information in this document, created by the medical scribe for me, accurately reflects the services I personally performed and the decisions made by me. I have reviewed and approved this document for accuracy prior to leaving the patient care area.  October 15, 2019 10:24 AM  Shahnaz Moreno MD  [facility] [place]    Again, thank you for allowing me to participate in the care of your patient.        Sincerely,        Shahnaz Moreno MD

## 2020-02-20 ENCOUNTER — OFFICE VISIT (OUTPATIENT)
Dept: FAMILY MEDICINE | Facility: CLINIC | Age: 35
End: 2020-02-20
Payer: COMMERCIAL

## 2020-02-20 VITALS
TEMPERATURE: 99 F | OXYGEN SATURATION: 99 % | DIASTOLIC BLOOD PRESSURE: 76 MMHG | RESPIRATION RATE: 16 BRPM | HEART RATE: 67 BPM | BODY MASS INDEX: 20.66 KG/M2 | SYSTOLIC BLOOD PRESSURE: 122 MMHG | HEIGHT: 64 IN | WEIGHT: 121 LBS

## 2020-02-20 DIAGNOSIS — N63.21 BREAST LUMP ON LEFT SIDE AT 2 O'CLOCK POSITION: ICD-10-CM

## 2020-02-20 DIAGNOSIS — Z01.818 PREOP GENERAL PHYSICAL EXAM: Primary | ICD-10-CM

## 2020-02-20 DIAGNOSIS — R93.3 ABNORMAL COLONOSCOPY: ICD-10-CM

## 2020-02-20 PROBLEM — R74.01 ELEVATED AST (SGOT): Status: RESOLVED | Noted: 2019-09-17 | Resolved: 2020-02-20

## 2020-02-20 PROBLEM — R79.89 ABNORMAL TSH: Status: RESOLVED | Noted: 2019-09-17 | Resolved: 2020-02-20

## 2020-02-20 PROCEDURE — 99214 OFFICE O/P EST MOD 30 MIN: CPT | Performed by: NURSE PRACTITIONER

## 2020-02-20 ASSESSMENT — PAIN SCALES - GENERAL: PAINLEVEL: NO PAIN (0)

## 2020-02-20 ASSESSMENT — MIFFLIN-ST. JEOR: SCORE: 1214.88

## 2020-02-20 NOTE — PROGRESS NOTES
Faxed Pre-op notes, no labs and no Ekg to [facility], [phone], right fax confirmed at 10:43 am today, 2/20/2020.  Marjorie Gonzalez [facility]  2nd Floor  Primary Care

## 2020-02-20 NOTE — PROGRESS NOTES
[address] [address]  [phone]  Dept: [phone]    PRE-OP EVALUATION:  Today's date: 2020    Marjorie Lee (: 1985) presents for pre-operative evaluation assessment as requested by Radha Nieto.  She requires evaluation and anesthesia risk assessment prior to undergoing surgery/procedure for evaluation of inflammatory area of colon previously seen on colonoscopy in 2019. .    Fax number for surgical facility:   Primary Physician: La Scott  Type of Anesthesia Anticipated: to be determined    Patient has a Health Care Directive or Living Will:  YES will bring in copy    Preop Questions 2020   Who is doing your surgery? Dr. Miguel   What are you having done? Colonoscopy under general anesthesia   Date of Surgery/Procedure: 3/5/20   Facility or Hospital where procedure/surgery will be performed: [facility]   1.  Do you have a history of Heart attack, stroke, stent, coronary bypass surgery, or other heart surgery? No   2.  Do you ever have any pain or discomfort in your chest? No   3.  Do you have a history of  Heart Failure? No   4.   Are you troubled by shortness of breath when:  walking on a level surface, or up a slight hill, or at night? No   5.  Do you currently have a cold, bronchitis or other respiratory infection? No   6.  Do you have a cough, shortness of breath, or wheezing? No   7.  Do you sometimes get pains in the calves of your legs when you walk? No   8. Do you or anyone in your family have previous history of blood clots? No   9.  Do you or does anyone in your family have a serious bleeding problem such as prolonged bleeding following surgeries or cuts? No   10. Have you ever had problems with anemia or been told to take iron pills? No   11. Have you had any abnormal blood loss such as black, tarry or bloody stools, or abnormal vaginal bleeding? No   12. Have  you ever had a blood transfusion? No   13. Have you or any of your relatives ever had problems with anesthesia? No   14. Do you have sleep apnea, excessive snoring or daytime drowsiness? No   15. Do you have any prosthetic heart valves? No   16. Do you have prosthetic joints? No   17. Is there any chance that you may be pregnant? No         HPI:     HPI related to upcoming procedure: Rectal bleeding in April 2019 prompted GI referral.  Patient did colonoscopy in September 2019 which showed multiple ulcers in the rectum, sigmoid colon, and descending colon.  6 month follow up colonoscopy was recommended.  She denies any further episodes of bleeding, fatigue, weight loss, night sweats, diarrhea, constipation, or abdominal pain.         See problem list for active medical problems.  Problems all longstanding and stable, except as noted/documented.  See ROS for pertinent symptoms related to these conditions.      MEDICAL HISTORY:     Patient Active Problem List    Diagnosis Date Noted     Elevated AST (SGOT) 09/17/2019     Priority: Medium     Rectal bleeding 09/17/2019     Priority: Medium     Abnormal TSH 09/17/2019     Priority: Medium     Abnormal colonoscopy 09/03/2019     Priority: Medium     IUD (intrauterine device) in place 07/17/2018     Priority: Medium      Past Medical History:   Diagnosis Date     NO ACTIVE PROBLEMS      Past Surgical History:   Procedure Laterality Date     COLONOSCOPY N/A 9/3/2019    Procedure: Colonoscopy, With Polypectomy And Biopsy;  Surgeon: Radha Neri DO;  Location: [facility] OR     COLONOSCOPY WITH CO2 INSUFFLATION N/A 9/3/2019    Procedure: COLONOSCOPY, WITH CO2 INSUFFLATION;  Surgeon: Radha Neri DO;  Location: [facility] OR     Current Outpatient Medications   Medication Sig Dispense Refill     Multiple Vitamins-Minerals (MULTIVITAMIN PO) Take 1 tablet by mouth daily        OTC products: no recent use of OTC ASA, NSAIDS or Steroids and no use of herbal medications or other  "supplements    No Known Allergies   Latex Allergy: NO    Social History     Tobacco Use     Smoking status: Never Smoker     Smokeless tobacco: Never Used   Substance Use Topics     Alcohol use: Yes     Comment: occ     History   Drug Use No       REVIEW OF SYSTEMS:   CONSTITUTIONAL: NEGATIVE for fever, chills, change in weight  INTEGUMENTARY/SKIN: NEGATIVE for worrisome rashes, moles or lesions  EYES: NEGATIVE for vision changes or irritation  ENT/MOUTH: NEGATIVE for ear, mouth and throat problems  RESP: NEGATIVE for significant cough or SOB  BREAST: lump on left breast  CV: NEGATIVE for chest pain, palpitations or peripheral edema  GI: NEGATIVE for nausea, abdominal pain, heartburn, or change in bowel habits  : NEGATIVE for frequency, dysuria, or hematuria  MUSCULOSKELETAL: NEGATIVE for significant arthralgias or myalgia  NEURO: NEGATIVE for weakness, dizziness or paresthesias  ENDOCRINE: NEGATIVE for temperature intolerance, skin/hair changes  HEME: NEGATIVE for bleeding problems  PSYCHIATRIC: NEGATIVE for changes in mood or affect    EXAM:   /76 (BP Location: Left arm, Patient Position: Chair, Cuff Size: Adult Regular)   Pulse 67   Temp 99  F (37.2  C) (Oral)   Resp 16   Ht 1.619 m (5' 3.75\")   Wt 54.9 kg (121 lb)   LMP  (LMP Unknown)   SpO2 99%   Breastfeeding No   BMI 20.93 kg/m      GENERAL APPEARANCE: healthy, alert and no distress     EYES: EOMI, PERRL     HENT: ear canals and TM's normal and nose and mouth without ulcers or lesions     NECK: no adenopathy, no asymmetry, masses, or scars and thyroid normal to palpation     RESP: lungs clear to auscultation - no rales, rhonchi or wheezes     BREAST: mass left breast at 2 oclock position- 2 cm in size, firm, nonmobile.     CV: regular rates and rhythm, normal S1 S2, no S3 or S4 and no murmur, click or rub     ABDOMEN:  soft, nontender, no HSM or masses and bowel sounds normal     MS: extremities normal- no gross deformities noted, no evidence " of inflammation in joints, FROM in all extremities.     SKIN: no suspicious lesions or rashes     NEURO: Normal strength and tone, sensory exam grossly normal, mentation intact and speech normal     PSYCH: mentation appears normal. and affect normal/bright     LYMPHATICS: No cervical adenopathy    DIAGNOSTICS:   No labs or EKG required for low risk surgery (cataract, skin procedure, breast biopsy, etc)    Recent Labs   Lab Test 07/01/19  0818   HGB 13.8         POTASSIUM 4.0   CR 0.72        IMPRESSION:   Reason for surgery/procedure: previous abnormal colonoscopy    The proposed surgical procedure is considered LOW risk.    REVISED CARDIAC RISK INDEX  The patient has the following serious cardiovascular risks for perioperative complications such as (MI, PE, VFib and 3  AV Block):  No serious cardiac risks  INTERPRETATION: 0 risks: Class I (very low risk - 0.4% complication rate)    The patient has the following additional risks for perioperative complications:  No identified additional risks      ICD-10-CM    1. Preop general physical exam Z01.818    2. Abnormal colonoscopy R93.3    3. Breast lump on left side at 2 o'clock position N63.21 US Breast Bilateral Complete 4 Quadrants     MA Diagnostic Digital Bilateral   I addition to the preop exam, I addressed the issue of left breast lump.  Per patient it is not worsening but it is persistent.  We will send for imaging and follow up accordingly.      RECOMMENDATIONS:     --Patient is on no chronic medications    APPROVAL GIVEN to proceed with proposed procedure, without further diagnostic evaluation       Signed Electronically by: MARCY Carlin CNP    Copy of this evaluation report is provided to requesting physician.    [facility] Preop Guidelines    Revised Cardiac Risk Index

## 2020-02-20 NOTE — PATIENT INSTRUCTIONS
Schedule imaging at [place]:  [phone]  General schedule #: [phone]    Patient Instructions     Please avoid fish oil, aspirin,  ibuprofen, motrin, advil, aleve, or naproxen 7 days prior to surgery.    Ok to take all prescribed medications prior to surgery unless otherwise discussed at your visit.    If you develop fever >100.4 or cough come in so we can possibly treat it prior to your surgery.  If not, we may need to delay your surgery.    Before Your Surgery      Call your surgeon if there is any change in your health. This includes signs of a cold or flu (such as a sore throat, runny nose, cough, rash or fever).    Do not smoke, drink alcohol or take over the counter medicine (unless your surgeon or primary care doctor tells you to) for the 24 hours before and after surgery.    If you take prescribed drugs: Follow your doctor s orders about which medicines to take and which to stop until after surgery.    Eating and drinking prior to surgery: follow the instructions from your surgeon    Take a shower or bath the night before surgery. Use the soap your surgeon gave you to gently clean your skin. If you do not have soap from your surgeon, use your regular soap. Do not shave or scrub the surgery site.  Wear clean pajamas and have clean sheets on your bed.       At [facility], we strive to deliver an exceptional experience to you, every time we see you. If you receive a survey, please complete it as we do value your feedback.  If you have MyChart, you can expect to receive results automatically within 24 hours of their completion.  Your provider will send a note interpreting your results as well.   If you do not have MyChart, you should receive your results in about a week by mail.    Your care team:                            Family Medicine Internal Medicine   MD Michael Sanders MD Shantel Branch-Fleming, MD Katya Georgiev PA-C Megan Hill, APRN CNP     El Baugh MD Pediatrics   LEN Hicks, MD Kaitlynn Rosa APRN CNP   MD Nilda Mock MD Deborah Mielke, MD Kim Thein, APRN CNP      Clinic hours: Monday - Thursday 7 am-7 pm; Fridays 7 am-5 pm.   Urgent care: Monday - Friday 11 am-9 pm; Saturday and Sunday 9 am-5 pm.  Pharmacy : Monday -Thursday 8 am-8 pm; Friday 8 am-6 pm; Saturday and Sunday 9 am-5 pm.     Clinic: [phone]   Pharmacy: [phone]

## 2020-02-26 RX ORDER — SODIUM, POTASSIUM,MAG SULFATES 17.5-3.13G
1 SOLUTION, RECONSTITUTED, ORAL ORAL SEE ADMIN INSTRUCTIONS
Qty: 2 BOTTLE | Refills: 0 | Status: SHIPPED | OUTPATIENT
Start: 2020-02-26 | End: 2020-09-22

## 2020-02-26 RX ORDER — BISACODYL 5 MG/1
15 TABLET, DELAYED RELEASE ORAL SEE ADMIN INSTRUCTIONS
Qty: 3 TABLET | Refills: 0 | Status: SHIPPED | OUTPATIENT
Start: 2020-02-26 | End: 2020-09-22

## 2020-02-27 ENCOUNTER — ANCILLARY PROCEDURE (OUTPATIENT)
Dept: MAMMOGRAPHY | Facility: CLINIC | Age: 35
End: 2020-02-27
Attending: NURSE PRACTITIONER
Payer: COMMERCIAL

## 2020-02-27 ENCOUNTER — ANCILLARY PROCEDURE (OUTPATIENT)
Dept: ULTRASOUND IMAGING | Facility: CLINIC | Age: 35
End: 2020-02-27
Attending: NURSE PRACTITIONER
Payer: COMMERCIAL

## 2020-02-27 DIAGNOSIS — N63.21 BREAST LUMP ON LEFT SIDE AT 2 O'CLOCK POSITION: ICD-10-CM

## 2020-02-27 PROCEDURE — G0279 TOMOSYNTHESIS, MAMMO: HCPCS

## 2020-02-27 PROCEDURE — 76642 ULTRASOUND BREAST LIMITED: CPT | Mod: LT

## 2020-02-27 PROCEDURE — 77066 DX MAMMO INCL CAD BI: CPT

## 2020-03-04 ENCOUNTER — ANESTHESIA EVENT (OUTPATIENT)
Dept: SURGERY | Facility: AMBULATORY SURGERY CENTER | Age: 35
End: 2020-03-04

## 2020-03-04 RX ORDER — ACETAMINOPHEN 325 MG/1
975 TABLET ORAL ONCE
Status: CANCELLED | OUTPATIENT
Start: 2020-03-04 | End: 2020-03-04

## 2020-03-04 RX ORDER — FENTANYL CITRATE 50 UG/ML
25-50 INJECTION, SOLUTION INTRAMUSCULAR; INTRAVENOUS
Status: CANCELLED | OUTPATIENT
Start: 2020-03-04

## 2020-03-04 RX ORDER — ONDANSETRON 2 MG/ML
4 INJECTION INTRAMUSCULAR; INTRAVENOUS EVERY 30 MIN PRN
Status: CANCELLED | OUTPATIENT
Start: 2020-03-04

## 2020-03-04 RX ORDER — FLUMAZENIL 0.1 MG/ML
0.2 INJECTION, SOLUTION INTRAVENOUS
Status: CANCELLED | OUTPATIENT
Start: 2020-03-04 | End: 2020-03-05

## 2020-03-04 RX ORDER — MEPERIDINE HYDROCHLORIDE 25 MG/ML
12.5 INJECTION INTRAMUSCULAR; INTRAVENOUS; SUBCUTANEOUS
Status: CANCELLED | OUTPATIENT
Start: 2020-03-04

## 2020-03-04 RX ORDER — NALOXONE HYDROCHLORIDE 0.4 MG/ML
.1-.4 INJECTION, SOLUTION INTRAMUSCULAR; INTRAVENOUS; SUBCUTANEOUS
Status: CANCELLED | OUTPATIENT
Start: 2020-03-04 | End: 2020-03-05

## 2020-03-04 RX ORDER — SODIUM CHLORIDE, SODIUM LACTATE, POTASSIUM CHLORIDE, CALCIUM CHLORIDE 600; 310; 30; 20 MG/100ML; MG/100ML; MG/100ML; MG/100ML
INJECTION, SOLUTION INTRAVENOUS CONTINUOUS
Status: CANCELLED | OUTPATIENT
Start: 2020-03-04

## 2020-03-04 RX ORDER — ONDANSETRON 4 MG/1
4 TABLET, ORALLY DISINTEGRATING ORAL EVERY 30 MIN PRN
Status: CANCELLED | OUTPATIENT
Start: 2020-03-04

## 2020-03-04 RX ORDER — ONDANSETRON 4 MG/1
4 TABLET, ORALLY DISINTEGRATING ORAL EVERY 6 HOURS PRN
Status: CANCELLED | OUTPATIENT
Start: 2020-03-04

## 2020-03-04 RX ORDER — ONDANSETRON 2 MG/ML
4 INJECTION INTRAMUSCULAR; INTRAVENOUS EVERY 6 HOURS PRN
Status: CANCELLED | OUTPATIENT
Start: 2020-03-04

## 2020-03-05 ENCOUNTER — SURGERY (OUTPATIENT)
Age: 35
End: 2020-03-05
Payer: COMMERCIAL

## 2020-03-05 ENCOUNTER — ANESTHESIA (OUTPATIENT)
Dept: SURGERY | Facility: AMBULATORY SURGERY CENTER | Age: 35
End: 2020-03-05
Payer: COMMERCIAL

## 2020-03-05 ENCOUNTER — HOSPITAL ENCOUNTER (OUTPATIENT)
Facility: AMBULATORY SURGERY CENTER | Age: 35
Discharge: HOME | End: 2020-03-05
Attending: INTERNAL MEDICINE | Admitting: INTERNAL MEDICINE
Payer: COMMERCIAL

## 2020-03-05 VITALS — HEART RATE: 62 BPM

## 2020-03-05 VITALS
SYSTOLIC BLOOD PRESSURE: 106 MMHG | HEART RATE: 53 BPM | TEMPERATURE: 99 F | OXYGEN SATURATION: 100 % | RESPIRATION RATE: 18 BRPM | DIASTOLIC BLOOD PRESSURE: 69 MMHG

## 2020-03-05 DIAGNOSIS — Z12.11 SPECIAL SCREENING FOR MALIGNANT NEOPLASMS, COLON: Primary | ICD-10-CM

## 2020-03-05 LAB
COLONOSCOPY: NORMAL
HCG UR QL: NEGATIVE

## 2020-03-05 PROCEDURE — 88312 SPECIAL STAINS GROUP 1: CPT | Performed by: INTERNAL MEDICINE

## 2020-03-05 PROCEDURE — 88305 TISSUE EXAM BY PATHOLOGIST: CPT | Performed by: INTERNAL MEDICINE

## 2020-03-05 PROCEDURE — 45380 COLONOSCOPY AND BIOPSY: CPT | Performed by: INTERNAL MEDICINE

## 2020-03-05 PROCEDURE — 45380 COLONOSCOPY AND BIOPSY: CPT

## 2020-03-05 PROCEDURE — G8907 PT DOC NO EVENTS ON DISCHARG: HCPCS

## 2020-03-05 PROCEDURE — 81025 URINE PREGNANCY TEST: CPT | Performed by: ANESTHESIOLOGY

## 2020-03-05 PROCEDURE — G8918 PT W/O PREOP ORDER IV AB PRO: HCPCS

## 2020-03-05 RX ORDER — PROPOFOL 10 MG/ML
INJECTION, EMULSION INTRAVENOUS PRN
Status: DISCONTINUED | OUTPATIENT
Start: 2020-03-05 | End: 2020-03-05

## 2020-03-05 RX ORDER — LIDOCAINE 40 MG/G
CREAM TOPICAL
Status: DISCONTINUED | OUTPATIENT
Start: 2020-03-05 | End: 2020-03-06 | Stop reason: HOSPADM

## 2020-03-05 RX ORDER — PROPOFOL 10 MG/ML
INJECTION, EMULSION INTRAVENOUS CONTINUOUS PRN
Status: DISCONTINUED | OUTPATIENT
Start: 2020-03-05 | End: 2020-03-05

## 2020-03-05 RX ORDER — SODIUM CHLORIDE, SODIUM LACTATE, POTASSIUM CHLORIDE, CALCIUM CHLORIDE 600; 310; 30; 20 MG/100ML; MG/100ML; MG/100ML; MG/100ML
INJECTION, SOLUTION INTRAVENOUS CONTINUOUS
Status: DISCONTINUED | OUTPATIENT
Start: 2020-03-05 | End: 2020-03-06 | Stop reason: HOSPADM

## 2020-03-05 RX ORDER — ONDANSETRON 2 MG/ML
4 INJECTION INTRAMUSCULAR; INTRAVENOUS
Status: DISCONTINUED | OUTPATIENT
Start: 2020-03-05 | End: 2020-03-06 | Stop reason: HOSPADM

## 2020-03-05 RX ORDER — LIDOCAINE HYDROCHLORIDE 20 MG/ML
INJECTION, SOLUTION INFILTRATION; PERINEURAL PRN
Status: DISCONTINUED | OUTPATIENT
Start: 2020-03-05 | End: 2020-03-05

## 2020-03-05 RX ADMIN — PROPOFOL 200 MCG/KG/MIN: 10 INJECTION, EMULSION INTRAVENOUS at 08:37

## 2020-03-05 RX ADMIN — SODIUM CHLORIDE, SODIUM LACTATE, POTASSIUM CHLORIDE, CALCIUM CHLORIDE: 600; 310; 30; 20 INJECTION, SOLUTION INTRAVENOUS at 08:32

## 2020-03-05 RX ADMIN — LIDOCAINE HYDROCHLORIDE 60 MG: 20 INJECTION, SOLUTION INFILTRATION; PERINEURAL at 08:37

## 2020-03-05 RX ADMIN — PROPOFOL 70 MG: 10 INJECTION, EMULSION INTRAVENOUS at 08:37

## 2020-03-05 NOTE — ANESTHESIA CARE TRANSFER NOTE
Patient: Marjorie Lee    Procedure(s):  COLONOSCOPY, WITH CO2 INSUFFLATION  Colonoscopy, With Polypectomy And Biopsy    Diagnosis: Diarrhea, unspecified type [R19.7]  Diagnosis Additional Information: No value filed.    Anesthesia Type:   MAC     Note:  Airway :Nasal Cannula  Patient transferred to:Phase II  Handoff Report: Identifed the Patient, Identified the Reponsible Provider, Reviewed the pertinent medical history, Discussed the surgical course, Reviewed Intra-OP anesthesia mangement and issues during anesthesia, Set expectations for post-procedure period and Allowed opportunity for questions and acknowledgement of understanding      Vitals: (Last set prior to Anesthesia Care Transfer)    CRNA VITALS  3/5/2020 0847 - 3/5/2020 0922      3/5/2020             Pulse:  60    SpO2:  99 %                Electronically Signed By: MARCY Hernandez CRNA  March 5, 2020  9:22 AM

## 2020-03-05 NOTE — ANESTHESIA POSTPROCEDURE EVALUATION
Anesthesia POST Procedure Evaluation    Patient: Marjorie Lee   MRN:     [id number] Gender:   female   Age:    35 year old :      1985        Preoperative Diagnosis: Diarrhea, unspecified type [R19.7]   Procedure(s):  COLONOSCOPY, WITH CO2 INSUFFLATION  Colonoscopy, With Polypectomy And Biopsy   Postop Comments: No value filed.     Anesthesia Type: MAC       Disposition: Outpatient   Postop Pain Control: Uneventful            Sign Out: Well controlled pain   PONV: No   Neuro/Psych: Uneventful            Sign Out: Acceptable/Baseline neuro status   Airway/Respiratory: Uneventful            Sign Out: Acceptable/Baseline resp. status   CV/Hemodynamics: Uneventful            Sign Out: Acceptable CV status   Other NRE: NONE   DID A NON-ROUTINE EVENT OCCUR? No    Event details/Postop Comments:  Patient doing well post-operatively.  No significant issues.  Hemodynamically stable, pain well controlled, nausea well controlled.  Stable for discharge from the PACU           Last Anesthesia Record Vitals:  CRNA VITALS  3/5/2020 0847 - 3/5/2020 0947      3/5/2020             Pulse:  60    SpO2:  99 %          Last PACU Vitals:  Vitals Value Taken Time   /69 3/5/2020  9:15 AM   Temp     Pulse 65 3/5/2020  9:15 AM   Resp 18 3/5/2020  9:15 AM   SpO2 99 % 3/5/2020  9:15 AM   Temp src     NIBP 93/52 3/5/2020  9:12 AM   Pulse 60 3/5/2020  9:14 AM   SpO2 99 % 3/5/2020  9:14 AM   Resp     Temp     Ht Rate 63 3/5/2020  9:13 AM   Temp 2           Electronically Signed By: Trino Carbajal MD, 2020, 11:16 AM

## 2020-03-05 NOTE — DISCHARGE INSTRUCTIONS
[facility]  Same-Day Surgery   Adult Discharge Orders & Instructions   For 24 hours after surgery  1. Get plenty of rest.  A responsible adult must stay with you for at least 24 hours after you leave the hospital.   2. Do not drive or use heavy equipment.  If you have weakness or tingling, don't drive or use heavy equipment until this feeling goes away.  3. Do not drink alcohol.  4. Avoid strenuous or risky activities.  Ask for help when climbing stairs.   5. You may feel lightheaded.  IF so, sit for a few minutes before standing.  Have someone help you get up.   6. If you have nausea (feel sick to your stomach): Drink only clear liquids such as apple juice, ginger ale, broth or 7-Up.  Rest may also help.  Be sure to drink enough fluids.  Move to a regular diet as you feel able.  7. You may have a slight fever. Call the doctor if your fever is over 100 F (37.7 C) (taken under the tongue) or lasts longer than 24 hours.  8. You may have a dry mouth, a sore throat, muscle aches or trouble sleeping.  These should go away after 24 hours.  9. Do not make important or legal decisions.   Call your doctor for any of the followin.  Signs of infection (fever, growing tenderness at the surgery site, a large amount of drainage or bleeding, severe pain, foul-smelling drainage, redness, swelling).    2. It has been over 8 to 10 hours since surgery and you are still not able to urinate (pass water).    3.  Headache for over 24 hours.    4.  Numbness, tingling or weakness the day after surgery (if you had spinal anesthesia).  To contact a doctor, call _____[phone]______________________

## 2020-03-05 NOTE — ANESTHESIA PREPROCEDURE EVALUATION
"Anesthesia Pre-Procedure Evaluation    Patient: Marjorie Lee   MRN:     [id number] Gender:   female   Age:    35 year old :      1985        Preoperative Diagnosis: Diarrhea, unspecified type [R19.7]   Procedure(s):  COLONOSCOPY, WITH CO2 INSUFFLATION     LABS:  CBC:   Lab Results   Component Value Date    WBC 6.3 2019    HGB 13.8 2019    HCT 41.9 2019     2019     BMP:   Lab Results   Component Value Date     2019    POTASSIUM 4.0 2019    CHLORIDE 108 2019    CO2 26 2019    BUN 13 2019    CR 0.72 2019    GLC 87 2019    GLC 88 2017     COAGS: No results found for: PTT, INR, FIBR  POC: No results found for: BGM, HCG, HCGS  OTHER:   Lab Results   Component Value Date    ALEYDA 8.7 2019    ALBUMIN 3.8 2019    PROTTOTAL 7.6 2019    ALT 28 2019    AST 21 2019    ALKPHOS 48 2019    BILITOTAL 0.5 2019    TSH 3.72 2019    T4 0.79 2019        Preop Vitals    BP Readings from Last 3 Encounters:   20 122/76   10/15/19 110/64   19 108/67    Pulse Readings from Last 3 Encounters:   20 67   19 76   19 74      Resp Readings from Last 3 Encounters:   20 16   19 16   19 12    SpO2 Readings from Last 3 Encounters:   20 99%   19 96%   19 95%      Temp Readings from Last 1 Encounters:   20 37.2  C (99  F) (Oral)    Ht Readings from Last 1 Encounters:   20 1.619 m (5' 3.75\")      Wt Readings from Last 1 Encounters:   20 54.9 kg (121 lb)    Estimated body mass index is 20.93 kg/m  as calculated from the following:    Height as of 20: 1.619 m (5' 3.75\").    Weight as of 20: 54.9 kg (121 lb).     LDA:        Past Medical History:   Diagnosis Date     NO ACTIVE PROBLEMS       Past Surgical History:   Procedure Laterality Date     COLONOSCOPY N/A 9/3/2019    Procedure: Colonoscopy, With Polypectomy And " Biopsy;  Surgeon: Radha Neri DO;  Location: [facility] OR     COLONOSCOPY WITH CO2 INSUFFLATION N/A 9/3/2019    Procedure: COLONOSCOPY, WITH CO2 INSUFFLATION;  Surgeon: Radha Neri DO;  Location: [facility] OR      No Known Allergies     Anesthesia Evaluation     . Pt has had prior anesthetic.     No history of anesthetic complications          ROS/MED HX    ENT/Pulmonary:  - neg pulmonary ROS     Neurologic:  - neg neurologic ROS     Cardiovascular:  - neg cardiovascular ROS       METS/Exercise Tolerance:  >4 METS   Hematologic:  - neg hematologic  ROS       Musculoskeletal:  - neg musculoskeletal ROS       GI/Hepatic: Comment: Rectal bleeding, abnormal colonoscopy        Renal/Genitourinary:  - ROS Renal section negative       Endo:  - neg endo ROS       Psychiatric:  - neg psychiatric ROS       Infectious Disease:  - neg infectious disease ROS       Malignancy:      - no malignancy   Other:                         PHYSICAL EXAM:   Mental Status/Neuro: A/A/O   Airway: Facies: Feasible  Mallampati: I  Mouth/Opening: Full  TM distance: > 6 cm  Neck ROM: Full   Respiratory: Auscultation: CTAB     Resp. Rate: Normal     Resp. Effort: Normal      CV: Rhythm: Regular  Rate: Age appropriate  Heart: Normal Sounds  Edema: None   Comments:      Dental: Normal Dentition                Assessment:   ASA SCORE: 2    H&P: History and physical reviewed and following examination; no interval change.    NPO Status: NPO Appropriate     Plan:   Anes. Type:  MAC   Pre-Medication: None   Induction:  IV (Standard)   Airway: Native Airway   Access/Monitoring: PIV   Maintenance: Propofol Sedation     Postop Plan:   Postop Pain: None  Postop Sedation/Airway: Not planned  Disposition: Outpatient     PONV Management: Adult Risk Factors: Female   Prevention:, Propofol     CONSENT: Direct conversation   Plan and risks discussed with: Patient   Blood Products: Consent Deferred (Minimal Blood Loss)                   Trino Carbajal MD

## 2020-03-10 LAB — COPATH REPORT: NORMAL

## 2020-09-22 ENCOUNTER — VIRTUAL VISIT (OUTPATIENT)
Dept: GASTROENTEROLOGY | Facility: CLINIC | Age: 35
End: 2020-09-22
Payer: COMMERCIAL

## 2020-09-22 DIAGNOSIS — R93.3 ABNORMAL COLONOSCOPY: Primary | ICD-10-CM

## 2020-09-22 PROCEDURE — 99213 OFFICE O/P EST LOW 20 MIN: CPT | Mod: TEL | Performed by: PHYSICIAN ASSISTANT

## 2020-09-22 RX ORDER — ACETAMINOPHEN 160 MG
1 TABLET,DISINTEGRATING ORAL
COMMUNITY
End: 2022-06-16

## 2020-09-22 RX ORDER — ASCORBIC ACID 500 MG
500 TABLET ORAL
COMMUNITY
End: 2021-03-25

## 2020-09-22 RX ORDER — LANOLIN ALCOHOL/MO/W.PET/CERES
1000 CREAM (GRAM) TOPICAL
COMMUNITY
End: 2021-03-25

## 2020-09-22 NOTE — PROGRESS NOTES
"Marjorie Lee is a 35 year old female who is being evaluated via a billable telephone visit.      The patient has been notified of following:     \"This telephone visit will be conducted via a call between you and your physician/provider. We have found that certain health care needs can be provided without the need for a physical exam.  This service lets us provide the care you need with a short phone conversation.  If a prescription is necessary we can send it directly to your pharmacy.  If lab work is needed we can place an order for that and you can then stop by our lab to have the test done at a later time.    Telephone visits are billed at different rates depending on your insurance coverage. During this emergency period, for some insurers they may be billed the same as an in-person visit.  Please reach out to your insurance provider with any questions.    If during the course of the call the physician/provider feels a telephone visit is not appropriate, you will not be charged for this service.\"    Patient has given verbal consent for Telephone visit?  Yes    What phone number would you like to be contacted at? [phone]    How would you like to obtain your AVS? Cheryle Amezquita LPN      Phone call duration: 15 minutes    Cassy Young PA-C      GASTROENTEROLOGY FOLLOW UP TELEPHONE VISIT    CC/REFERRING MD:    La Scott    REASON FOR CONSULTATION:  RECHECK (Follow up colonoscopy)    HISTORY OF PRESENT ILLNESS:    Marjorie Lee is 35 year old female who presents for follow up of abnormal colonoscopy.     She was initially seen in July 2019 for concerns with bloody diarrhea and which was thought to be related to ischemic colitis. Please see previous note for more detail. Briefly, she was noting multiple episodes of bloody diarrhea in April and May of 2019. At that time she was aggressively training for a marathon and was only noting symptoms after aggressive runs. We further evaluated " with a colonoscopy in September 2019 which reveale dmultiple ulcers in rectum, sigmoid, descending and ascending colon. The scope was unable to pass hepatic flexure at that time due to significant looping and patient discomfort. Biopsies were consistent with acute inflammation likely from nsaid injury or infection. She had prior taken NSAID on one occ and discontinued. We further evaluated with stool studies at that time which were negative for infection. Her symptoms and clinical picture were more concerning for ischemic colitis. CT abd/pelvis at that time did show patent abdominopelvic arteries. She underwent a repeat colonoscopy in March 2020 which continues to show multiple ulcers in the entire examined colon as well as multiple ulcers in the terminal ileum. Biopsies are consistent with acute inflammation, no signs of chronicity.     She presents via telephoen visit today to follow up in this regard. She overall feels well. She does note a flare of diarrhea and abd cramping once every few months. She has not been able to identify trigger. Symptoms usually last 4-6 hours and resolve on it's own. She does not have any rectal bleeding. She stays away from NSAID's but does admit to taking for 3 days a few weeks ago due to pain from a fall.     PMHx, PSHx, Social Hx, Family Hx have been reviewed.     PREVIOUS ENDOSCOPY:    Colonoscopy 3/5/2020  Impression:       - Hemorrhoids found on perianal exam.                             - Multiple ulcers in the entire examined colon. Biopsied. Similar to previous exam.                             - Erythematous mucosa in the ascending colon and in the cecum. Biopsied.                             - Multiple ulcers in the terminal ileum. Biopsied.     TO ORIGINAL REPORT   Status: Signed Out   Date Ordered:3/10/2020   Date Reported:3/10/2020 13:12   Signed Out By: Solange Montejo M.D., [facility], Physicians     INTERPRETATION:   ZN and PAS stains have been performed on blocks A1,  B1, and C1 on account   of ileal granuloma and other foci of   loose histiocytes.  These stains are negative respectively for acid-fast   bacteria and fungi. Initial diagnosis   and comment are unchanged.     ORIGINAL REPORT:     SPECIMEN(S):   A: Ileum biopsy   B: Colon biopsy, ascending   C: Colon biopsy, descending   D: Rectal biopsy     FINAL DIAGNOSIS:   A. Ileum, Biopsy:   Ileal mucosa with focal active inflammation loose cluster of histiocytes   without true granuloma (See Comment)     B. Ascending Colon, Biopsy:   Colonic mucosa with focal neutrophilic cryptitis and granuloma formation;   no crypt architectural distortion, metaplasias, dysplasia, or other features of chronic colitis apparent (See    Comment)     C. Descending Colon, Biopsy:   Colonic mucosa with focal neutrophilic cryptitis and loose clusters of histiocytes without true granuloma; no   crypt architectural distortion, metaplasias, dysplasia, or other features of chronic colitis apparent (See   Comment)     D. Rectal, Biopsy:   Rectal mucosa with no significant histologic abnormality     COMMENT:   Parts A to C show evidence of patchy acute inflammation, and a small granuloma in the ascending colon, but without the chronic features one expects with Crohn or ulcerative colitis.    The possibility of an infective or other reactive, (e.g. to drugs) rather than IBD etiology for diarrhea   therefore persists, as discussed at the time of earlier biopsy. Clinical correlation is required. Additional   stains (GMS, ZN/AFB) have been ordered in view of the isolated granuloma and loose histiocytes; report of findings will follow.     I have personally reviewed all specimens and/or slides, including the listed special stains, and used them   with my medical judgement to determine or confirm the final diagnosis.     Electronically signed out by:     Solange Montejo M.D., [facility], [facility]       Colonoscopy 9/3/2019  Impression:       - Multiple ulcers in  the rectum, in the sigmoid colon, in the descending colon and in the ascending colon. Biopsied.                             - Tortuous colon with signifcant looping and patient discomfort. Extent reached was hepatic flexure despite increasing sedation, position changes and manual pressure.      SPECIMEN(S):   A: Colon biopsy, ascending   B: Colon biopsy, descending   C: Rectal biopsy     FINAL DIAGNOSIS:   A. COLON BIOPSY, ASCENDING:   - Colonic mucosa with focal active inflammation   - Negative for significant crypt glandular distortion, pseudo-pyloric gland metaplasia, granuloma formation and dysplasia     B. COLON BIOPSY, DESCENDING:   - Colonic mucosa with focal active inflammation   - Negative for significant crypt glandular distortion, pseudo-pyloric gland or Paneth metaplasia, granuloma formation and dysplasia     C. RECTAL BIOPSY:   - Colonic mucosa with focal active inflammation   - Negative for significant crypt glandular distortion, pseudo-pyloric gland or Paneth metaplasia, granuloma formation and dysplasia     COMMENT:     The acute inflammation is patchy and differential includes drug effect (such as NSAIDs) versus infection.   Crohn's disease is less likely as there is no evidence of chronicity (no pseudo-pyloric gland metaplasia or   Paneth cell metaplasia on the left side).  Dr. ANDRE Garcia has reviewed parts A, B and concurs.     I have personally reviewed all specimens and/or slides, including the listed special stains, and used them   with my medical judgement to determine or confirm the final diagnosis.     Electronically signed out by:     Trino Henry M.D., [facility]     PERTINENT STUDIES: (I personally reviewed these laboratory studies today)  Most recent CBC:   Recent Labs   Lab Test 07/01/19  0818   WBC 6.3   HGB 13.8   HCT 41.9        Most recent hepatic panel:  Recent Labs   Lab Test 09/17/19  1041 07/01/19  0818   ALT 28 39   AST 21 70*     Most recent creatinine:  Recent Labs    Lab Test 07/01/19  0818   CR 0.72       TSH   Date Value Ref Range Status   09/17/2019 3.72 0.40 - 4.00 mU/L Final         ASSESSMENT/PLAN:    Marjorie Lee is a 35 year old female who presents for follow up from abnormal colonoscopy. See previous notes for more detail. She was initially seen in July of 2019 for bloody diarrhea with exertion, concerning for ischemic colitis. She was evaluated at that time with colonoscopy which revelaed multiple ulcers. CT abd/pelvis at that time did show patent abdominopelvic arteries. A repeat colonoscopy in March of 2020 however continued to show ulcers throughout the colon. She notes that she is overall feeling well at this time. She does have an occ flare up of abdominal cramping and diarrhea that typically lasts 4-6 hours and occurs once every few months. She otherwise has a normal BM daily. She is avoiding NSAID's as previously recommended however she has taken it recently for pain due to a fall. Advised to avoid NSAID's.     It is unclear why she continued to show ulcers in her colon and terminal ileum on repeat colonoscopy. She was asymptomatic at that time. Biopsies did not show any signs of chronicity as would be expected with an IBD. She is overall doing well however does note occasional symptoms (once every few months) of abdominal cramping and diarrhea that typically resolve within a few hours. She has not had any further episodes of rectal bleeding since last year. Advised patient to contact our office should she develop persistent diarrhea, abd pain or rectal bleeding etc. Consider MRE and fecal calpro level at this time.     Follow up in 6 months, sooner if needed.     Abnormal colonoscopy    Thank you for this consultation.  It was a pleasure to participate in the care of this patient; please contact us with any further questions.      Cassy Young PA-C  Gastroenterology  [facility]

## 2020-09-26 ASSESSMENT — ENCOUNTER SYMPTOMS
FEVER: 0
FREQUENCY: 0
BREAST MASS: 0
HEADACHES: 0
CONSTIPATION: 0
DIARRHEA: 0
DIZZINESS: 0
EYE PAIN: 0
HEMATURIA: 0
HEMATOCHEZIA: 0
CHILLS: 0
COUGH: 0
NERVOUS/ANXIOUS: 1
ABDOMINAL PAIN: 0

## 2020-09-28 ENCOUNTER — OFFICE VISIT (OUTPATIENT)
Dept: FAMILY MEDICINE | Facility: CLINIC | Age: 35
End: 2020-09-28
Payer: COMMERCIAL

## 2020-09-28 VITALS
RESPIRATION RATE: 16 BRPM | HEART RATE: 60 BPM | TEMPERATURE: 98.7 F | DIASTOLIC BLOOD PRESSURE: 78 MMHG | OXYGEN SATURATION: 99 % | WEIGHT: 123.8 LBS | BODY MASS INDEX: 21.13 KG/M2 | HEIGHT: 64 IN | SYSTOLIC BLOOD PRESSURE: 130 MMHG

## 2020-09-28 DIAGNOSIS — Z23 NEED FOR INFLUENZA VACCINATION: ICD-10-CM

## 2020-09-28 DIAGNOSIS — Z00.00 ROUTINE GENERAL MEDICAL EXAMINATION AT A HEALTH CARE FACILITY: Primary | ICD-10-CM

## 2020-09-28 DIAGNOSIS — Z23 NEED FOR TDAP VACCINATION: ICD-10-CM

## 2020-09-28 PROBLEM — N63.21 BREAST LUMP ON LEFT SIDE AT 2 O'CLOCK POSITION: Status: RESOLVED | Noted: 2020-02-20 | Resolved: 2020-09-28

## 2020-09-28 PROCEDURE — 90715 TDAP VACCINE 7 YRS/> IM: CPT | Performed by: NURSE PRACTITIONER

## 2020-09-28 PROCEDURE — 90471 IMMUNIZATION ADMIN: CPT | Performed by: NURSE PRACTITIONER

## 2020-09-28 PROCEDURE — 90472 IMMUNIZATION ADMIN EACH ADD: CPT | Performed by: NURSE PRACTITIONER

## 2020-09-28 PROCEDURE — 99395 PREV VISIT EST AGE 18-39: CPT | Mod: 25 | Performed by: NURSE PRACTITIONER

## 2020-09-28 PROCEDURE — 90686 IIV4 VACC NO PRSV 0.5 ML IM: CPT | Performed by: NURSE PRACTITIONER

## 2020-09-28 RX ORDER — COPPER 313.4 MG/1
INTRAUTERINE DEVICE INTRAUTERINE
COMMUNITY
End: 2020-10-14

## 2020-09-28 ASSESSMENT — MIFFLIN-ST. JEOR: SCORE: 1227.58

## 2020-09-28 ASSESSMENT — PAIN SCALES - GENERAL: PAINLEVEL: NO PAIN (0)

## 2020-09-28 NOTE — PROGRESS NOTES
Prior to immunization administration, verified patients identity using patient s name and date of birth. Please see Immunization Activity for additional information.     Screening Questionnaire for Adult Immunization    Are you sick today?   No   Do you have allergies to medications, food, a vaccine component or latex?   No   Have you ever had a serious reaction after receiving a vaccination?   No   Do you have a long-term health problem with heart, lung, kidney, or metabolic disease (e.g., diabetes), asthma, a blood disorder, no spleen, complement component deficiency, a cochlear implant, or a spinal fluid leak?  Are you on long-term aspirin therapy?   No   Do you have cancer, leukemia, HIV/AIDS, or any other immune system problem?   No   Do you have a parent, brother, or sister with an immune system problem?   No   In the past 3 months, have you taken medications that affect  your immune system, such as prednisone, other steroids, or anticancer drugs; drugs for the treatment of rheumatoid arthritis, Crohn s disease, or psoriasis; or have you had radiation treatments?   No   Have you had a seizure, or a brain or other nervous system problem?   No   During the past year, have you received a transfusion of blood or blood    products, or been given immune (gamma) globulin or antiviral drug?   No   For women: Are you pregnant or is there a chance you could become       pregnant during the next month?   No   Have you received any vaccinations in the past 4 weeks?   No     Immunization questionnaire answers were all negative.        Per orders of VINI Scott, injection of Tdap, Influenza given by Tamanna Bridges MA. Patient instructed to remain in clinic for 15 minutes afterwards, and to report any adverse reaction to me immediately.       Screening performed by Tamanna Bridges MA on 9/28/2020 at 9:44 AM.

## 2020-09-28 NOTE — PROGRESS NOTES
SUBJECTIVE:   CC: Marjorie Lee is an 35 year old woman who presents for preventive health visit.       Patient has been advised of split billing requirements and indicates understanding: Yes  Healthy Habits:    Do you get at least three servings of calcium containing foods daily (dairy, green leafy vegetables, etc.)? yes    Amount of exercise or daily activities, outside of work: 6 day(s) per week    Problems taking medications regularly No    Medication side effects: No    Have you had an eye exam in the past two years? yes    Do you see a dentist twice per year? yes    Do you have sleep apnea, excessive snoring or daytime drowsiness?no          Today's PHQ-2 Score:   PHQ-2 ( 1999 Pfizer) 9/26/2020 2/20/2020   Q1: Little interest or pleasure in doing things 0 0   Q2: Feeling down, depressed or hopeless 1 0   PHQ-2 Score 1 0   Q1: Little interest or pleasure in doing things Not at all -   Q2: Feeling down, depressed or hopeless Several days -   PHQ-2 Score 1 -     States there are ups and downs since COVID started.  Deals with the stress with running, bible study.  Has support from family.  Has never done therapy before but is aware of resources through [employer].  She will look into these if she desires.      Abuse: Current or Past(Physical, Sexual or Emotional)- No  Do you feel safe in your environment? Yes        Social History     Tobacco Use     Smoking status: Never Smoker     Smokeless tobacco: Never Used   Substance Use Topics     Alcohol use: Yes     Comment: occ     If you drink alcohol do you typically have >3 drinks per day or >7 drinks per week? No                     Reviewed orders with patient.  Reviewed health maintenance and updated orders accordingly - Yes  BP Readings from Last 3 Encounters:   09/28/20 130/78   03/05/20 106/69   02/20/20 122/76    Wt Readings from Last 3 Encounters:   09/28/20 56.2 kg (123 lb 12.8 oz)   02/20/20 54.9 kg (121 lb)   09/24/19 52.3 kg (115 lb 6.4 oz)                   Patient Active Problem List   Diagnosis     IUD (intrauterine device) in place     Abnormal colonoscopy     Rectal bleeding     Past Surgical History:   Procedure Laterality Date     COLONOSCOPY N/A 9/3/2019    Procedure: Colonoscopy, With Polypectomy And Biopsy;  Surgeon: Radha Neri DO;  Location: [facility] OR     COLONOSCOPY N/A 3/5/2020    Procedure: Colonoscopy, With Polypectomy And Biopsy;  Surgeon: Radha Neri DO;  Location: [facility] OR     COLONOSCOPY WITH CO2 INSUFFLATION N/A 9/3/2019    Procedure: COLONOSCOPY, WITH CO2 INSUFFLATION;  Surgeon: Radha Neri DO;  Location: [facility]     COLONOSCOPY WITH CO2 INSUFFLATION N/A 3/5/2020    Procedure: COLONOSCOPY, WITH CO2 INSUFFLATION;  Surgeon: Radha Neri DO;  Location: [facility] OR       Social History     Tobacco Use     Smoking status: Never Smoker     Smokeless tobacco: Never Used   Substance Use Topics     Alcohol use: Yes     Comment: occ     Family History   Problem Relation Age of Onset     Osteoarthritis Mother      Osteoarthritis Father      Breast Cancer Maternal Aunt      Other - See Comments Paternal Uncle         glioblastoma     Other - See Comments Paternal Uncle         glioblastoma     Colon Cancer Maternal Grandmother          Current Outpatient Medications   Medication Sig Dispense Refill     Cholecalciferol (VITAMIN D3) 50 MCG (2000 UT) CAPS Take 1 capsule by mouth 3 times a week       cyanocobalamin (VITAMIN B-12) 1000 MCG tablet Take 1,000 mcg by mouth 3 times a week       Multiple Vitamins-Minerals (MULTIVITAMIN PO) Take 1 tablet by mouth daily        paragard intrauterine copper device        vitamin C (ASCORBIC ACID) 500 MG tablet Take 500 mg by mouth 3 times a week       Allergies   Allergen Reactions     No Known Allergies        Mammogram not appropriate for this patient based on age.    Pertinent mammograms are reviewed under the imaging tab.  History of abnormal Pap smear: NO - age 30-65 PAP every 5 years with negative HPV  co-testing recommended     Reviewed and updated as needed this visit by clinical staff  Tobacco  Allergies  Meds         Reviewed and updated as needed this visit by Provider        Past Medical History:   Diagnosis Date     NO ACTIVE PROBLEMS       Past Surgical History:   Procedure Laterality Date     COLONOSCOPY N/A 9/3/2019    Procedure: Colonoscopy, With Polypectomy And Biopsy;  Surgeon: Radha Neri DO;  Location: [facility]     COLONOSCOPY N/A 3/5/2020    Procedure: Colonoscopy, With Polypectomy And Biopsy;  Surgeon: Radha Neri DO;  Location: [facility]     COLONOSCOPY WITH CO2 INSUFFLATION N/A 9/3/2019    Procedure: COLONOSCOPY, WITH CO2 INSUFFLATION;  Surgeon: Radha Neri DO;  Location: [facility]     COLONOSCOPY WITH CO2 INSUFFLATION N/A 3/5/2020    Procedure: COLONOSCOPY, WITH CO2 INSUFFLATION;  Surgeon: Radha Neri DO;  Location: [facility]     OB History    Para Term  AB Living   2 0 0 0 0 2   SAB TAB Ectopic Multiple Live Births   0 0 0 0 2      # Outcome Date GA Lbr Hardy/2nd Weight Sex Delivery Anes PTL Lv   2      F    MALATHI   1  2010        MALATHI       ROS:  CONSTITUTIONAL: NEGATIVE for fever, chills, change in weight  INTEGUMENTARU/SKIN: NEGATIVE for worrisome rashes, moles or lesions  EYES: NEGATIVE for vision changes or irritation  ENT: NEGATIVE for ear, mouth and throat problems  RESP: NEGATIVE for significant cough or SOB  BREAST: NEGATIVE for masses, tenderness or discharge  CV: NEGATIVE for chest pain, palpitations or peripheral edema  GI: NEGATIVE for nausea, abdominal pain, heartburn, or change in bowel habits  : NEGATIVE for unusual urinary or vaginal symptoms. Periods are regular.  MUSCULOSKELETAL: NEGATIVE for significant arthralgias or myalgia  NEURO: NEGATIVE for weakness, dizziness or paresthesias  PSYCHIATRIC: NEGATIVE for changes in mood or affect    OBJECTIVE:   /78 (BP Location: Left arm, Patient Position: Sitting, Cuff Size: Adult  "Regular)   Pulse 60   Temp 98.7  F (37.1  C) (Oral)   Resp 16   Ht 1.619 m (5' 3.75\")   Wt 56.2 kg (123 lb 12.8 oz)   SpO2 99%   BMI 21.42 kg/m    EXAM:  GENERAL: healthy, alert and no distress  EYES: Eyes grossly normal to inspection, PERRL and conjunctivae and sclerae normal  HENT: ear canals and TM's normal, nose and mouth without ulcers or lesions  NECK: no adenopathy, no asymmetry, masses, or scars and thyroid normal to palpation  RESP: lungs clear to auscultation - no rales, rhonchi or wheezes  CV: regular rate and rhythm, normal S1 S2, no S3 or S4, no murmur, click or rub, no peripheral edema and peripheral pulses strong  ABDOMEN: soft, nontender, no hepatosplenomegaly, no masses and bowel sounds normal  MS: no gross musculoskeletal defects noted, no edema  SKIN: no suspicious lesions or rashes  NEURO: Normal strength and tone, mentation intact and speech normal  PSYCH: mentation appears normal, affect normal/bright    Diagnostic Test Results:  Labs reviewed in Epic  none     ASSESSMENT/PLAN:   1. Routine general medical examination at a health care facility  Up to date on preventative care.  Labs normal last year so will wait to repeat for another 1-2 years.  She is considering switching to Mirena IUD and will schedule for this if desires.     2. Need for influenza vaccination  - INFLUENZA VACCINE IM > 6 MONTHS VALENT IIV4 [72001]    3. Need for Tdap vaccination  - TDAP, IM (10 - 64 YRS) - Adacel    Patient has been advised of split billing requirements and indicates understanding: Yes  COUNSELING:   Reviewed preventive health counseling, as reflected in patient instructions       Regular exercise       Healthy diet/nutrition       Vision screening       Hearing screening       Contraception       Family planning    Estimated body mass index is 21.42 kg/m  as calculated from the following:    Height as of this encounter: 1.619 m (5' 3.75\").    Weight as of this encounter: 56.2 kg (123 lb 12.8 " oz).        She reports that she has never smoked. She has never used smokeless tobacco.      Counseling Resources:  ATP IV Guidelines  Pooled Cohorts Equation Calculator  Breast Cancer Risk Calculator  BRCA-Related Cancer Risk Assessment: FHS-7 Tool  FRAX Risk Assessment  ICSI Preventive Guidelines  Dietary Guidelines for Americans, 2010  USDA's MyPlate  ASA Prophylaxis  Lung CA Screening    MARCY Carlin CNP  [facility]  Answers for HPI/ROS submitted by the patient on 9/26/2020   Annual Exam:  Frequency of exercise:: 6-7 days/week  Getting at least 3 servings of Calcium per day:: Yes  Diet:: Regular (no restrictions), Other  Taking medications regularly:: Yes  Medication side effects:: None  Bi-annual eye exam:: Yes  Dental care twice a year:: NO  Sleep apnea or symptoms of sleep apnea:: None  abdominal pain: No  Blood in stool: No  Blood in urine: No  chest pain: No  chills: No  congestion: No  constipation: No  cough: No  diarrhea: No  dizziness: No  ear pain: No  eye pain: No  nervous/anxious: Yes  fever: No  frequency: No  genital sores: No  headaches: No  pelvic pain: No  vaginal discharge: No  breast mass: No  Additional concerns today:: Yes  Duration of exercise:: Greater than 60 minutes

## 2020-09-29 ENCOUNTER — MYC MEDICAL ADVICE (OUTPATIENT)
Dept: GASTROENTEROLOGY | Facility: CLINIC | Age: 35
End: 2020-09-29

## 2020-09-29 DIAGNOSIS — R19.7 DIARRHEA, UNSPECIFIED TYPE: ICD-10-CM

## 2020-09-29 DIAGNOSIS — R93.3 ABNORMAL COLONOSCOPY: Primary | ICD-10-CM

## 2020-09-29 NOTE — PROGRESS NOTES
Patient seen on 9/22/2020. Reviewed with Dr. Win. Recommended further eval with labs, stool studies and MRE. Orders placed. My chart message sent to patient. See my chart encounter 9/29/2020.     Cassy Young PA-C  Gastroenterology  [facility]

## 2020-10-05 DIAGNOSIS — R19.7 DIARRHEA, UNSPECIFIED TYPE: ICD-10-CM

## 2020-10-05 LAB — ERYTHROCYTE [SEDIMENTATION RATE] IN BLOOD BY WESTERGREN METHOD: 10 MM/H (ref 0–20)

## 2020-10-05 PROCEDURE — 86140 C-REACTIVE PROTEIN: CPT | Performed by: PHYSICIAN ASSISTANT

## 2020-10-05 PROCEDURE — 85652 RBC SED RATE AUTOMATED: CPT | Performed by: PHYSICIAN ASSISTANT

## 2020-10-05 PROCEDURE — 36415 COLL VENOUS BLD VENIPUNCTURE: CPT | Performed by: PHYSICIAN ASSISTANT

## 2020-10-06 DIAGNOSIS — R19.7 DIARRHEA, UNSPECIFIED TYPE: ICD-10-CM

## 2020-10-06 LAB — CRP SERPL-MCNC: <2.9 MG/L (ref 0–8)

## 2020-10-06 PROCEDURE — 87209 SMEAR COMPLEX STAIN: CPT | Performed by: PHYSICIAN ASSISTANT

## 2020-10-06 PROCEDURE — 83993 ASSAY FOR CALPROTECTIN FECAL: CPT | Performed by: PHYSICIAN ASSISTANT

## 2020-10-06 PROCEDURE — 87177 OVA AND PARASITES SMEARS: CPT | Performed by: PHYSICIAN ASSISTANT

## 2020-10-07 LAB
CALPROTECTIN STL-MCNT: 112 MG/KG (ref 0–49.9)
O+P STL MICRO: NORMAL
O+P STL MICRO: NORMAL
SPECIMEN SOURCE: NORMAL

## 2020-10-13 ENCOUNTER — MYC MEDICAL ADVICE (OUTPATIENT)
Dept: FAMILY MEDICINE | Facility: CLINIC | Age: 35
End: 2020-10-13

## 2020-10-14 ENCOUNTER — OFFICE VISIT (OUTPATIENT)
Dept: FAMILY MEDICINE | Facility: CLINIC | Age: 35
End: 2020-10-14
Payer: COMMERCIAL

## 2020-10-14 VITALS
BODY MASS INDEX: 20.66 KG/M2 | HEIGHT: 64 IN | RESPIRATION RATE: 16 BRPM | SYSTOLIC BLOOD PRESSURE: 138 MMHG | TEMPERATURE: 97.4 F | WEIGHT: 121 LBS | OXYGEN SATURATION: 97 % | HEART RATE: 64 BPM | DIASTOLIC BLOOD PRESSURE: 75 MMHG

## 2020-10-14 DIAGNOSIS — Z30.433 ENCOUNTER FOR REMOVAL AND REINSERTION OF IUD: Primary | ICD-10-CM

## 2020-10-14 PROCEDURE — 58300 INSERT INTRAUTERINE DEVICE: CPT | Performed by: NURSE PRACTITIONER

## 2020-10-14 PROCEDURE — 58301 REMOVE INTRAUTERINE DEVICE: CPT | Performed by: NURSE PRACTITIONER

## 2020-10-14 PROCEDURE — 99207 PR DROP WITH A PROCEDURE: CPT | Performed by: NURSE PRACTITIONER

## 2020-10-14 ASSESSMENT — PAIN SCALES - GENERAL: PAINLEVEL: NO PAIN (0)

## 2020-10-14 ASSESSMENT — MIFFLIN-ST. JEOR: SCORE: 1214.88

## 2020-10-14 NOTE — PATIENT INSTRUCTIONS
Left message for patient, [facility] [place] [facility], unsure if they are switching pharmacy or need a new script.    Routed to PSR for letter as well.   We placed an IUD (intrauterine device) today for birth control.  You may experience some cramping like a period for the next few days and spotting for the next month or so.    Nothing in the vagina (tampons, intercourse, etc.) for the next 48 hours.    If you have the hormonal IUD (Mirena, Liletta, Lynnette) your bleeding pattern will remain irregular but lessen over time.  The first month, you may spot more often.  Some women don't get their period at all, some will get it every so often, but it will not be a regular, monthly cycle like you may be used to.    The risk for expelling the IUD is highest in the first month, therefore, come back in 4 weeks for a string check.  You should also check for the strings regularly yourself.    Congratulations on choosing such an effective birth control method!  Please contact us at any time with questions or concerns!

## 2020-10-14 NOTE — TELEPHONE ENCOUNTER
Please call patient.  IUD insertion on menses is definitely ok to do and sometimes makes insertion easier.  However, she is not scheduled in a procedure slot.  Please reschedule her for another day for a 40 minute appointment.    MARCY Carlin CNP

## 2020-10-14 NOTE — PROGRESS NOTES
"Subjective     Marjorie Lee is a 35 year old female who presents to clinic today for the following health issues:    HPI         Removal of IUD and insertion on Mirena    Previously consulted by me at her annual physical.     Currently on menses.       Objective    /75 (BP Location: Right arm, Patient Position: Chair, Cuff Size: Adult Regular)   Pulse 64   Temp 97.4  F (36.3  C) (Tympanic)   Resp 16   Ht 1.619 m (5' 3.75\")   Wt 54.9 kg (121 lb)   SpO2 97%   BMI 20.93 kg/m    Body mass index is 20.93 kg/m .            Assessment & Plan     Encounter for removal and reinsertion of IUD  See procedure note.   - REMOVE INTRAUTERINE DEVICE  - MD INSERTION OF IUD FOR THERAPEUTIC PURPOSES        Patient Instructions   We placed an IUD (intrauterine device) today for birth control.  You may experience some cramping like a period for the next few days and spotting for the next month or so.    Nothing in the vagina (tampons, intercourse, etc.) for the next 48 hours.    If you have the hormonal IUD (Mirena, Liletta, Lynnette) your bleeding pattern will remain irregular but lessen over time.  The first month, you may spot more often.  Some women don't get their period at all, some will get it every so often, but it will not be a regular, monthly cycle like you may be used to.    The risk for expelling the IUD is highest in the first month, therefore, come back in 4 weeks for a string check.  You should also check for the strings regularly yourself.    Congratulations on choosing such an effective birth control method!  Please contact us at any time with questions or concerns!        Return in about 4 weeks (around 11/11/2020), or if symptoms worsen or fail to improve.    La Scott, MARCY CNP  [facility]      "

## 2020-10-14 NOTE — PROCEDURES
After verifying consent, the cervix was visualized using a speculum.  The white IUD strings were visible.  The IUD strings were grasped with ring forceps and removed from cervix without difficulty.  Paragard device was verified to be intact and was shown to patient.  The patient tolerated the procedure well and no bleeding was visualized.    The patient meets and is agreeable to the following conditions:  She is parous.  She is not interested in conception in the near future.  She currently is in a stable, monogamous relationship.  There is no previous history of pelvic inflammatory disease.  There is no previous history of ectopic pregnancy.  She is willing to check monthly for the IUD string.  She is at least 8 weeks post-partum.  There is no history of unresolved abnormal uterine bleeding.  There is no history of an unresolved abnormal PAP smear.  She has no history of Colten's disease or an allergy to copper (for ParaGard).  She has no history of diabetes, AIDS, leukemia, IV drug use or chronic steroid use.  She is willing to return annually for pelvic exams  She denies the possibility of pregnancy.      The following risks were discussed with the patient:  Possibility of pregnancy and ectopic pregnancy.  Possibility of pelvic inflammatory disease, particularly with new partners.  Risk of uterine perforation or IUD expulsion.  Possibility of difficult removal.  Spotting or heavy bleeding.  Cramping, pain or infection during or after insertion.    The patient was given patient information on the IUD and the patient education brochure from the .  The patient has given consent to proceed with placement of the IUD.  A written consent form is present in the chart.  She wishes to proceed.    PROCEDURE:  IUD Placement    Type of IUD: Mirena    The patient was placed in a dorsal lithotomy potion and a bimanual exam was performed to determine the position of the uterus.  The speculum was placed, cervix was  identified and cleaned with betadine three times.   A single tooth tenaculum was applied to the anterior lip of the cervix for stabilization.  The uterus was sounded to 8.0 cm and removed. (Target sound depth is 6.5 cm to 8.5 cm.)   The IUD insertion device was inserted without difficulty into the uterus to manufacturers recommendationsunder sterile conditions to the sounding depth. The IUD string was then cut to 2.0 cm.    The patient tolerated this procedure without immediate complication and minimal bleeding.  The patient is to return or call immediately for any unexplained fever, abdominal or pelvic pain, excessive bleeding, possibility of pregnancy, foul-smelling discharge, sense that the IUD has been expelled.    IUD DEVICE:  LOT # [id number]  EXP Date June 2022    ND:  MIRENA:  77407-117-34      MARCY Carlin CNP

## 2020-10-22 ENCOUNTER — ANCILLARY PROCEDURE (OUTPATIENT)
Dept: MRI IMAGING | Facility: CLINIC | Age: 35
End: 2020-10-22
Attending: PHYSICIAN ASSISTANT
Payer: COMMERCIAL

## 2020-10-22 DIAGNOSIS — R93.3 ABNORMAL COLONOSCOPY: ICD-10-CM

## 2020-10-22 DIAGNOSIS — R19.7 DIARRHEA, UNSPECIFIED TYPE: ICD-10-CM

## 2020-10-22 PROCEDURE — 74183 MRI ABD W/O CNTR FLWD CNTR: CPT | Performed by: RADIOLOGY

## 2020-10-22 PROCEDURE — A9585 GADOBUTROL INJECTION: HCPCS | Performed by: PHYSICIAN ASSISTANT

## 2020-10-22 PROCEDURE — 72197 MRI PELVIS W/O & W/DYE: CPT | Performed by: RADIOLOGY

## 2020-10-22 RX ORDER — GADOBUTROL 604.72 MG/ML
7.5 INJECTION INTRAVENOUS ONCE
Status: COMPLETED | OUTPATIENT
Start: 2020-10-22 | End: 2020-10-22

## 2020-10-22 RX ORDER — GADOBUTROL 604.72 MG/ML
7.5 INJECTION INTRAVENOUS ONCE
Status: DISCONTINUED | OUTPATIENT
Start: 2020-10-22 | End: 2022-11-07

## 2020-10-22 RX ADMIN — GADOBUTROL 5.5 ML: 604.72 INJECTION INTRAVENOUS at 15:07

## 2020-11-19 ENCOUNTER — E-VISIT (OUTPATIENT)
Dept: URGENT CARE | Facility: URGENT CARE | Age: 35
End: 2020-11-19
Payer: COMMERCIAL

## 2020-11-19 DIAGNOSIS — Z20.822 SUSPECTED COVID-19 VIRUS INFECTION: Primary | ICD-10-CM

## 2020-11-19 PROCEDURE — 99421 OL DIG E/M SVC 5-10 MIN: CPT | Performed by: PHYSICIAN ASSISTANT

## 2020-11-20 NOTE — PATIENT INSTRUCTIONS
Dear Marjorie Lee,    Your symptoms show that you may have coronavirus (COVID-19). This illness can cause fever, cough and trouble breathing. Many people get a mild case and get better on their own. Some people can get very sick.    Will I be tested for COVID-19?  We would like to test you for Covid-19 virus. I have placed orders for this test.     For all employees or close contacts (except [employer] and [employer] - see below), go to your [facility] home page and scroll down to the section that says  You have an appointment that needs to be scheduled  and click the large green button that says  Schedule Now  and follow the steps to find the next available opening.     If you are unable to complete these steps or if you cannot find any available times, please call [phone] to schedule employee testing.       [employer] employees or close contacts, please call [phone].   [employer] employees or close contacts call [phone].    When it's time for your COVID test:  Stay at least 6 feet away from others. (If someone will drive you to your test, stay in the backseat, as far away from the  as you can.)  Cover your mouth and nose with a mask, tissue or washcloth.  Go straight to the testing site. Don't make any stops on the way there or back.    Starting now:     Do not go to work.   o If you receive a negative COVID-19 test result and were NOT exposed to someone with a known positive COVID-19 test, you can return to work once you're free of fever for 24 hours without fever-reducing medication and your symptoms are improving or resolved.  o If you receive a positive COVID-19 test result, you must be cleared by Employee Occupational Health and Safety to return to work.   o If you were exposed to someone who has tested positive for COVID-19, you can return to work 14 days after your last contact with the positive individual, provided you do not have symptoms at all during that time. In some cases,  "your manager may ask you to come back sooner than 14 days.     During this time, don't leave the house except for testing or medical care.  o Stay in your own room, even for meals. Use your own bathroom if you can.  o Stay away from others in your home. No hugging, kissing or shaking hands. No visitors.  o Don't go to work, school or anywhere else.    Clean \"high touch\" surfaces often (doorknobs, counters, handles, etc.). Use a household cleaning spray or wipes. You'll find a full list of  on the EPA website: www.epa.gov/pesticide-registration/list-n-disinfectants-use-against-sars-cov-2.    Cover your mouth and nose with a mask, tissue or washcloth to avoid spreading germs.    Wash your hands and face often. Use soap and water.    People in these groups are at risk for severe illness due to COVID-19:  o People 65 years and older  o People who live in a nursing home or long-term care facility  o People with chronic disease (lung, heart, cancer, diabetes, kidney, liver, immunologic)  o People who have a weakened immune system, including those who:  - Are in cancer treatment  - Take medicine that weakens the immune system, such as corticosteroids  - Had a bone marrow or organ transplant  - Have an immune deficiency  - Have poorly controlled HIV or AIDS  - Are obese (body mass index of 40 or higher)  - Smoke regularly      Caregivers should wear gloves while washing dishes, handling laundry and cleaning bedrooms and bathrooms.    Use caution when washing and drying laundry: Don't shake dirty laundry, and use the warmest water setting that you can.    For more tips, go to www.cdc.gov/coronavirus/2019-ncov/downloads/10Things.pdf.    Sign up for [employer]. We know it's scary to hear that you might have COVID-19. We want to track your symptoms to make sure you're okay over the next 2 weeks. Please look for an email from [employer]--this is a free, online program that we'll use to keep in touch. To sign up, " follow the link in the email you will receive. Learn more at [email or web address]    How can I take care of myself?    Get lots of rest. Drink extra fluids (unless a doctor has told you not to)    Take Tylenol (acetaminophen) for fever or pain. If you have liver or kidney problems, ask your family doctor if it's okay to take Tylenol.  Adults can take either:    650 mg (two 325 mg pills) every 4 to 6 hours, or     1,000 mg (two 500 mg pills) every 8 hours as needed.    Note: Don't take more than 3,000 mg in one day. Acetaminophen is found in many medicines (both prescribed and over-the-counter medicines). Read all labels to be sure you don't take too much.  For children, check the Tylenol bottle for the right dose. The dose is based on the child's age or weight.    If you have other health problems (like cancer, heart failure, an organ transplant or severe kidney disease): Call your specialty clinic if you don't feel better in the next 2 days.    Know when to call 911. Emergency warning signs include:  Trouble breathing or shortness of breath  Pain or pressure in the chest that doesn't go away  Feeling confused like you haven't felt before, or not being able to wake up  Bluish-colored lips or face    Where can I get more information?    [facility] - About COVID-19: [email or web address]  CDC - What to Do If You're Sick: www.cdc.gov/coronavirus/2019-ncov/about/steps-when-sick.html

## 2020-11-21 DIAGNOSIS — Z20.822 SUSPECTED COVID-19 VIRUS INFECTION: ICD-10-CM

## 2020-11-21 LAB
SARS-COV-2 RNA SPEC QL NAA+PROBE: NORMAL
SPECIMEN SOURCE: NORMAL

## 2020-11-21 PROCEDURE — U0003 INFECTIOUS AGENT DETECTION BY NUCLEIC ACID (DNA OR RNA); SEVERE ACUTE RESPIRATORY SYNDROME CORONAVIRUS 2 (SARS-COV-2) (CORONAVIRUS DISEASE [COVID-19]), AMPLIFIED PROBE TECHNIQUE, MAKING USE OF HIGH THROUGHPUT TECHNOLOGIES AS DESCRIBED BY CMS-2020-01-R: HCPCS | Performed by: PHYSICIAN ASSISTANT

## 2020-11-22 LAB
LABORATORY COMMENT REPORT: ABNORMAL
SARS-COV-2 RNA SPEC QL NAA+PROBE: POSITIVE
SPECIMEN SOURCE: ABNORMAL

## 2020-12-16 ENCOUNTER — VIRTUAL VISIT (OUTPATIENT)
Dept: FAMILY MEDICINE | Facility: CLINIC | Age: 35
End: 2020-12-16
Payer: COMMERCIAL

## 2020-12-16 VITALS — WEIGHT: 120 LBS | HEIGHT: 64 IN | BODY MASS INDEX: 20.49 KG/M2

## 2020-12-16 DIAGNOSIS — L30.9 DERMATITIS: ICD-10-CM

## 2020-12-16 DIAGNOSIS — M72.2 PLANTAR FASCIITIS: Primary | ICD-10-CM

## 2020-12-16 PROCEDURE — 99213 OFFICE O/P EST LOW 20 MIN: CPT | Mod: TEL | Performed by: NURSE PRACTITIONER

## 2020-12-16 RX ORDER — TRIAMCINOLONE ACETONIDE 1 MG/G
OINTMENT TOPICAL 2 TIMES DAILY
Qty: 30 G | Refills: 1 | Status: SHIPPED | OUTPATIENT
Start: 2020-12-16 | End: 2021-07-21

## 2020-12-16 ASSESSMENT — MIFFLIN-ST. JEOR: SCORE: 1210.35

## 2020-12-16 ASSESSMENT — PAIN SCALES - GENERAL: PAINLEVEL: MILD PAIN (2)

## 2020-12-16 NOTE — PROGRESS NOTES
"Marjorie Lee is a 35 year old female who is being evaluated via a billable telephone visit.      The patient has been notified of following:     \"This telephone visit will be conducted via a call between you and your physician/provider. We have found that certain health care needs can be provided without the need for a physical exam.  This service lets us provide the care you need with a short phone conversation.  If a prescription is necessary we can send it directly to your pharmacy.  If lab work is needed we can place an order for that and you can then stop by our lab to have the test done at a later time.    Telephone visits are billed at different rates depending on your insurance coverage. During this emergency period, for some insurers they may be billed the same as an in-person visit.  Please reach out to your insurance provider with any questions.    If during the course of the call the physician/provider feels a telephone visit is not appropriate, you will not be charged for this service.\"    Patient has given verbal consent for Telephone visit?  Yes    What phone number would you like to be contacted at? [phone]    How would you like to obtain your AVS? Cheryle    Subjective     Marjorie Lee is a 35 year old female who presents via phone visit today for the following health issues:    HPI     Musculoskeletal problem/pain  Onset/Duration: 9 months  Description  Location: foot - right  Joint Swelling: YES heel  Redness: no  Pain: YES- worse in the morning causing limp, deep throbbing pain  Warmth: no  Intensity:  Mild-moderate  Progression of Symptoms:  worsening  Accompanying signs and symptoms:   Fevers: no  Numbness/tingling/weakness: no  History  Trauma to the area: no  Recent illness:  no  Previous similar problem: YES  Previous evaluation:  no  Precipitating or alleviating factors:  Aggravating factors include: run fast, certain jumping exercises  Therapies tried and outcome: stretches, foam " roller, massage, activity changes         Concern - Derm  Onset: intermittent  Description: Patient complains of rash that comes and goes throughout the year, in different parts of the body. She is requesting medication in case rash returns.  Intensity: mild  Progression of Symptoms:  waxing and waning  Accompanying Signs & Symptoms: back of neck, hips/waist  Previous history of similar problem: yes  Precipitating factors:        Worsened by: scratching  Alleviating factors:        Improved by:   Therapies tried and outcome: Hydrocortisone       Review of Systems   Constitutional, HEENT, cardiovascular, pulmonary, GI, , musculoskeletal, neuro, skin, endocrine and psych systems are negative, except as otherwise noted.       Objective   Vitals - Patient Reported  Pain Score: Mild Pain (2)  Pain Loc: Foot      Vitals:  No vitals were obtained today due to virtual visit.    healthy, alert and no distress  PSYCH: Alert and oriented times 3; coherent speech, normal   rate and volume, able to articulate logical thoughts, able   to abstract reason, no tangential thoughts, no hallucinations   or delusions  Her affect is normal  RESP: No cough, no audible wheezing, able to talk in full sentences  Remainder of exam unable to be completed due to telephone visits          Assessment/Plan:    Assessment & Plan     Plantar fasciitis  xrays ordered.  Continue conservative care at home.  Follow up with podiatry.    - XR Foot Right G/E 3 Views; Future  - XR Ankle Right G/E 3 Views; Future  - Orthopedic & Spine  Referral; Future    Dermatitis  - triamcinolone (KENALOG) 0.1 % external ointment; Apply topically 2 times daily To affected area for no longer than 2 weeks at a time.        Patient Instructions     Patient Education     Treating Plantar Fasciitis  First, your healthcare provider tries to find out the cause of your problem. He or she can then suggest ways to ease pain. If your pain is due to poor foot mechanics,  occasionally custom-made shoe inserts (orthoses) may help.    Ease symptoms    To relieve mild symptoms, try aspirin, ibuprofen, or other medicines as directed. Rubbing ice on the area may also help.    To lessen severe pain and swelling, your healthcare provider may give you pills or injections. In some cases, you may need a walking cast. Physical therapy, such as ultrasound or a daily stretching program, may also be recommended. Surgery is rarely needed.    To ease symptoms caused by poor foot mechanics, your foot may be taped. This supports the arch and temporarily controls movement. Night splints may also help by stretching the fascia.    Control movement  If taping helps, your healthcare provider may prescribe orthoses. These are inserts built from plaster casts of your feet. They control the way your foot moves. As a result, your symptoms may go away.  Reduce overuse  Every time your foot strikes the ground, the plantar fascia is stretched. You can lessen the strain on the plantar fascia and the chance of overuse by:    Losing any excess weight    Not running on hard or uneven ground    Using orthoses, if recommended, in your shoes and house slippers  If surgery is needed  Your healthcare provider may consider surgery if other types of treatment don't control your pain. During surgery, the plantar fascia is partially cut to release tension. As you heal, fibrous tissue fills the space between the heel bone and the plantar fascia.  [employer] last reviewed this educational content on 1/1/2018 2000-2020 [employer]. [address] [place] [address]. All rights reserved. This information is not intended as a substitute for professional medical care. Always follow your healthcare professional's instructions.               Return in about 4 weeks (around 1/13/2021), or if symptoms worsen or fail to improve, for Follow up.    MARCY Carlin [facility]  [employer]    Phone call duration:  9 minutes

## 2020-12-16 NOTE — PATIENT INSTRUCTIONS
Patient Education     Treating Plantar Fasciitis  First, your healthcare provider tries to find out the cause of your problem. He or she can then suggest ways to ease pain. If your pain is due to poor foot mechanics, occasionally custom-made shoe inserts (orthoses) may help.    Ease symptoms    To relieve mild symptoms, try aspirin, ibuprofen, or other medicines as directed. Rubbing ice on the area may also help.    To lessen severe pain and swelling, your healthcare provider may give you pills or injections. In some cases, you may need a walking cast. Physical therapy, such as ultrasound or a daily stretching program, may also be recommended. Surgery is rarely needed.    To ease symptoms caused by poor foot mechanics, your foot may be taped. This supports the arch and temporarily controls movement. Night splints may also help by stretching the fascia.    Control movement  If taping helps, your healthcare provider may prescribe orthoses. These are inserts built from plaster casts of your feet. They control the way your foot moves. As a result, your symptoms may go away.  Reduce overuse  Every time your foot strikes the ground, the plantar fascia is stretched. You can lessen the strain on the plantar fascia and the chance of overuse by:    Losing any excess weight    Not running on hard or uneven ground    Using orthoses, if recommended, in your shoes and house slippers  If surgery is needed  Your healthcare provider may consider surgery if other types of treatment don't control your pain. During surgery, the plantar fascia is partially cut to release tension. As you heal, fibrous tissue fills the space between the heel bone and the plantar fascia.  [email or web address] last reviewed this educational content on 1/1/2018 2000-2020 [employer]. [address] [place] [address], [place] [address]. All rights reserved. This information is not intended as a substitute for professional medical care. Always follow your  healthcare professional's instructions.

## 2020-12-21 ENCOUNTER — ANCILLARY PROCEDURE (OUTPATIENT)
Dept: GENERAL RADIOLOGY | Facility: CLINIC | Age: 35
End: 2020-12-21
Attending: NURSE PRACTITIONER
Payer: COMMERCIAL

## 2020-12-21 DIAGNOSIS — M72.2 PLANTAR FASCIITIS: ICD-10-CM

## 2020-12-21 PROCEDURE — 73630 X-RAY EXAM OF FOOT: CPT | Mod: RT | Performed by: RADIOLOGY

## 2020-12-21 PROCEDURE — 73610 X-RAY EXAM OF ANKLE: CPT | Mod: RT | Performed by: RADIOLOGY

## 2020-12-31 ENCOUNTER — OFFICE VISIT (OUTPATIENT)
Dept: PODIATRY | Facility: CLINIC | Age: 35
End: 2020-12-31
Payer: COMMERCIAL

## 2020-12-31 VITALS
DIASTOLIC BLOOD PRESSURE: 66 MMHG | WEIGHT: 120 LBS | BODY MASS INDEX: 20.49 KG/M2 | SYSTOLIC BLOOD PRESSURE: 104 MMHG | HEIGHT: 64 IN

## 2020-12-31 DIAGNOSIS — M72.2 PLANTAR FASCIITIS, RIGHT: Primary | ICD-10-CM

## 2020-12-31 PROCEDURE — 99203 OFFICE O/P NEW LOW 30 MIN: CPT | Performed by: PODIATRIST

## 2020-12-31 ASSESSMENT — MIFFLIN-ST. JEOR: SCORE: 1210.35

## 2020-12-31 NOTE — PROGRESS NOTES
ASSESSMENT/PLAN:    Encounter Diagnosis   Name Primary?     Plantar fasciitis, right Yes     The patient was educated about the causes and nature of arch and heel pain.  The anatomy and function of the plantar fascia was discussed.  The treatment plan discussed included icing, calf and plantar fascial stretching, avoidance of barefoot walking, wearing sturdy, supportive, stiffer-soled athletic-type shoes, activity modification, and over-the-counter arch supports versus custom orthoses.  A comprehensive After-Visit Summary was provided.     Marjorie Lee is referred to [facility] for prescription orthoses.      She is interested in physical therapy and I will specifically refer her for the running program.    I also discussed with her that the calcaneal spur is likely not the source of pain.  Discussed a prior, robust study that was done showing no correlation between bone spurs and pain.    Mook Vilchis DPM, FACFAS, MS    [facility]    Disclaimer: This note consists of symbols derived from keyboarding, dictation and/or voice recognition software. As a result, there may be errors in the script that have gone undetected. Please consider this when interpreting information found in this chart.    ____________________________________________________________________    HPI:       I was asked by La Scott CNP to evaluate Marjorie for plantar fasciitis.  Chief Complaint: right heel pain  Onset of problem: 9+ months  Pain/ discomfort is described as:  sharp  Ratin/10 at worst   Frequency:  intermittent    The pain is made worse with higher impact activity - hill running, repeats, jumping.  Avid runner  Previous treatment: stretching, massage  Prior x-ray    *  Patient Active Problem List   Diagnosis     IUD (intrauterine device) in place     Abnormal colonoscopy     Rectal bleeding   *  *  Past Surgical History:   Procedure Laterality Date      COLONOSCOPY N/A 9/3/2019    Procedure: Colonoscopy, With Polypectomy And Biopsy;  Surgeon: Radha Neri DO;  Location: [facility]     COLONOSCOPY N/A 3/5/2020    Procedure: Colonoscopy, With Polypectomy And Biopsy;  Surgeon: Radha Neri DO;  Location: [facility]     COLONOSCOPY WITH CO2 INSUFFLATION N/A 9/3/2019    Procedure: COLONOSCOPY, WITH CO2 INSUFFLATION;  Surgeon: Radha Neri DO;  Location: [facility]     COLONOSCOPY WITH CO2 INSUFFLATION N/A 3/5/2020    Procedure: COLONOSCOPY, WITH CO2 INSUFFLATION;  Surgeon: Radha Neri DO;  Location: [facility]   *  *  Current Outpatient Medications   Medication Sig Dispense Refill     Cholecalciferol (VITAMIN D3) 50 MCG (2000 UT) CAPS Take 1 capsule by mouth 3 times a week       cyanocobalamin (VITAMIN B-12) 1000 MCG tablet Take 1,000 mcg by mouth 3 times a week       levonorgestrel (MIRENA) 20 MCG/24HR IUD 1 each (20 mcg) by Intrauterine route continuous       Multiple Vitamins-Minerals (MULTIVITAMIN PO) Take 1 tablet by mouth daily        triamcinolone (KENALOG) 0.1 % external ointment Apply topically 2 times daily To affected area for no longer than 2 weeks at a time. 30 g 1     vitamin C (ASCORBIC ACID) 500 MG tablet Take 500 mg by mouth 3 times a week         ROS:     A 10-point review of systems was performed and is positive for that noted above in the HPI and as seen below.  All other areas are negative.     Numbness in feet?  no   Calf pain with walking? no  Recent foot/ankle injury? no  Weight change over past 12 months? no  Self perception as overweight? no  Recent flu-like symptoms? no  Joint pain other than feet ? yes    Social History: Employment:  yes;  Exercise/Physical activity:  running;  Tobacco use:  no  Social History     Socioeconomic History     Marital status:      Spouse name: Not on file     Number of children: Not on file     Years of education: Not on file     Highest education level: Not on file   Occupational History     Employer:  "[employer]   Social Needs     Financial resource strain: Not on file     Food insecurity     Worry: Not on file     Inability: Not on file     Transportation needs     Medical: Not on file     Non-medical: Not on file   Tobacco Use     Smoking status: Never Smoker     Smokeless tobacco: Never Used   Substance and Sexual Activity     Alcohol use: Yes     Comment: occ     Drug use: No     Sexual activity: Yes     Partners: Male     Birth control/protection: Implant   Lifestyle     Physical activity     Days per week: Not on file     Minutes per session: Not on file     Stress: Not on file   Relationships     Social connections     Talks on phone: Not on file     Gets together: Not on file     Attends Christianity service: Not on file     Active member of club or organization: Not on file     Attends meetings of clubs or organizations: Not on file     Relationship status: Not on file     Intimate partner violence     Fear of current or ex partner: Not on file     Emotionally abused: Not on file     Physically abused: Not on file     Forced sexual activity: Not on file   Other Topics Concern     Not on file   Social History Narrative     Not on file       Family history:  Family History   Problem Relation Age of Onset     Osteoarthritis Mother      Osteoarthritis Father      Breast Cancer Maternal Aunt      Other - See Comments Paternal Uncle         glioblastoma     Other - See Comments Paternal Uncle         glioblastoma     Colon Cancer Maternal Grandmother        Rheumatoid arthritis:  no  Foot Problems: bunions, toe issues - parent  Diabetes: no      EXAM:    Vitals: /66   Ht 1.619 m (5' 3.75\")   Wt 54.4 kg (120 lb)   BMI 20.76 kg/m    BMI: Body mass index is 20.76 kg/m .  Height: 5' 3.75\"    Constitutional/ general:  Pt is in no apparent distress, appears well-nourished.  Cooperative with history and physical exam.     Vascular:  Pedal pulses are palpable bilaterally for both the DP and PT arteries.  CFT < 3 " sec.  No edema.  Pedal hair growth noted.     Neuro:  Alert and oriented x 3. Coordinated gait.  Light touch sensation is intact to the L4, L5, S1 distributions. No obvious deficits.  No evidence of neurological-based weakness, spasticity, or contracture in the lower extremities.     Derm: Normal texture and turgor.  No erythema, ecchymosis, or cyanosis.  No open lesions.     Musculoskeletal:    Lower extremity muscle strength is normal.  Patient is ambulatory without an assistive device or brace .  No gross deformities. Pain on palpation to the plantar medial aspect of the right heel.  No significant pain with   side-to-side compression of the heel.  No nodularity noted.    Radiographic Exam:  X-Ray Findings:  I personally reviewed the films.    FOOT RIGHT THREE OR MORE VIEWS, ANKLE RIGHT THREE OR MORE VIEWS  December 21, 2020 12:41 PM      HISTORY: Plantar fasciitis.                                                   IMPRESSION: Plantar calcaneal spurring. Otherwise unremarkable. No  fracture identified.     RAYMOND BURNETTE MD

## 2020-12-31 NOTE — PATIENT INSTRUCTIONS
Thank you for choosing [facility]!    [facility] LOCATIONS     [facility] SCHEDULE SURGERY: [phone]   [address] [place] [address] APPOINTMENTS: [phone]   [place] [address] BILLING QUESTIONS: [phone] RADIOLOGY: [phone]       [place]    [address]    [place], [place] [id number] [phone]  [phone]      Please read through the following handouts and if you have any questions, please feel free to call us or send a [facility] message!      PHYSICAL THERAPY REFERRAL  [facility] ([facility])  Located in most [facility]  Central [phone]    [facility] LOCATIONS  [facility]  [address]  [place] [address]  Phone: [phone]  Fax: [phone] [facility]  [address]  [place] [address]  Phone: [phone]   Fax: [phone]   [facility]  [address]  [place] [address]  Phone: [phone]  Fax: [phone] [facility]  [address]  [place] [address]  Phone: [phone]   Fax: [phone]   [facility]   [address]  [place] [address]  Phone: [phone]  Fax: [phone] * Please call any location listed to make an appointment for a casting/fitting. Your referral was sent to their central office and they will all have the order on file.     [facility] LOCATIONS  [place]  [address] [place] [address]  [phone]   [address]  [phone] [place] [address]  [phone]   [address]  [phone]   [place]  [address]  [phone] [place] [address]  [phone]   [place]  [address]  [phone]   [place]  [phone] [place] [id number]  [phone]          PLANTAR FASCIITIS    Plantar fasciitis is often referred to as heel spurs or heel pain. Plantar fasciitis is a very common problem that affects people of all foot shapes, age, weight and activity level. Pain may be in the arch or on the weight-bearing surface of the heel. The pain may come on without injury or identifiable cause. Pain is generally present when first getting out of bed in the morning or up from a seated break.     CAUSES  The plantar fascia is a dense fibrous band of tissue that stretches across the bottom surface of the foot. The fascia helps support the foot muscles and arch. Plantar fasciitis is thought to be caused by mechanical strain or overload. Frequent walking without shoes or wearing unsupportive shoes is thought to cause structural overload and ultimately inflammation of the plantar fascia. Some people have heel spurs that can be seen on x-ray. The heel spur is actually a minor component of plantar fascitis and is largely ignored.       SELF TREATMENT   The easiest solution is to stop walking around your home without shoes. Plantar fasciitis is largely a shoe problem. Shoes are either not being worn often enough or your current shoes are inadequate for your weight, foot structure or activity level. The majority of shoes on the market today are not sufficient to resist development of plantar fasciitis or to promote healing. Assume that your current shoes are inadequate and will need to be replaced. Even high quality shoes wear out with 6 months to one year of frequent use. Weight loss is another option. Losing ten pounds in the next two months may be enough to resolve the problem. Ice applied to the area of pain two to three times per day for ten minutes each session can be very helpful. This should continue until the problem resolves. Achilles tendon stretching is essential. Stretch multiple times daily to promote healing and to prevent recurrence in the future.     MEDICAL  TREATMENT  Medical treatments often include custom arch supports, cortisone injections, physical therapy, splints to be worn in bed, prescription medications and surgery. The home treatments listed above will be necessary regardless of these advanced medical treatments. Surgery is rarely needed but is very helpful in selected cases.     PROGNOSIS  Plantar fasciitis can last from one day to a lifetime. Some people get intermittent fascitis that is very short-lived. Others suffer daily for years. Excessive body weight, frequent bare foot walking, long hours on the feet, inadequate shoes, predisposing foot structures and excessive activity such as running are all potential issues that lead to chronic and/or recurring plantar fascitis. Having plantar fasciitis means that you are forever prone to this problem and will require modification of some of the above factors. Most people seek treatment within one to four months. Healing usually requires a similar one to four month time frame. Healing time is relative to the amount of effort spent treating the problem.   Plantar fasciitis is highly recurrent. Risk factors often continue, including return to bare foot walking, inadequate shoes, excessive body weight, excessive activities, etc. Your life style and foot structure may predispose you to recurrent plantar fasciitis. A daily prevention regimen can be very helpful. Ongoing use of shoe inserts, careful attention to appropriate shoes, daily Achilles stretching, etc. may prevent recurrence. Prompt attention at the earliest warning signs of heel pain can resolve the problem in as short as a few days.     EXERCISES    Stair Exercise: Step on the stairs with the ball of your foot and hold your position for at least 15 seconds, then slowly step down with the heels of your foot. You can do this daily and as often as you want.   Picking the Towel: Sit comfortably and then pick the towel up with your toes. You can use any object  other than a towel as long as the material can be soft and you can pick it up with your toes.  Rolling the Bottle: Use a small ball or frozen water bottle and then roll it around with your foot.   Flex the Toes: Sit comfortably and then flex your toes by pointing it towards the floor or towards your body. This will relax and flex your foot and exercise your plantar fascia, the calf, and the Achilles tendon. The inability of the foot to stretch often causes the bunching up of the plantar fascia area leading to the pain.  Calf/Achilles Stretching: Lay on you back and raise one foot, then point your toes towards the floor. See photo below:               Hold each stretch for 10 seconds. Stretch 10 times per set, three sets per day. Morning, afternoon and evening. If your heel pain is very severe in the morning, consider doing the first set of stretches before you get out of bed.    THERAPIES DISCUSSED:  1.  Supportive Shoes: minimizing barefoot ambulation helps to provide cushion, padding and support to the ligament that is inflamed. Socks, flip flops, flats and some slippers are not typically sufficient to provide support. Shoes should be worn even indoors  2.  Insert/Orthotics: ones with an arch support built in to them provide further stress relief for the ligament. See the information below on recommended inserts.  3. Icing: using a frozen water bottle or orange, and rolling it along the bottom of the arch/heel can help to alleviate discomfort, and can act as a tissue massage to the painful, inflamed ligament.  There is evidence that shows icing at least three times daily can be beneficial  4.  Antiinflammatory (NSAID): Ibuprofen, Aleve, as well as Tylenol can be used to help decrease symptoms and improve pain levels. If you have high blood pressure, heart disease, stomach or kidney problems, use antiinflammatories sparingly. Tylenol should not be used if you have liver problems.   5. Activity Modifications: if  there are certain things that you do, whether it's going barefoot or certain shoes/activities, you should try to minimize those activities as much as possible until your symptoms are sufficiently resolved. Certainly, some activities, such as running on the treadmill, are easier to take a break from versus others, such as work or chores at home. If there are certain activities that hurt your heel, and you keep doing those activities that hurt your heel, your heel will keep hurting.  **If these initial therapies are insufficient, we have our tier 2 therapies that can more aggressively work to improve your symptoms and get you back to the activities that you enjoy!

## 2021-03-25 ENCOUNTER — VIRTUAL VISIT (OUTPATIENT)
Dept: GASTROENTEROLOGY | Facility: CLINIC | Age: 36
End: 2021-03-25
Payer: COMMERCIAL

## 2021-03-25 DIAGNOSIS — R93.3 ABNORMAL COLONOSCOPY: Primary | ICD-10-CM

## 2021-03-25 PROCEDURE — 99213 OFFICE O/P EST LOW 20 MIN: CPT | Mod: TEL | Performed by: PHYSICIAN ASSISTANT

## 2021-03-25 NOTE — PATIENT INSTRUCTIONS
It was a pleasure visiting with you today.     Continue to monitor your symptoms as you have been doing. Please reach out to me if any symptoms do develop or if you have any questions or concerns.     Continue to avoid NSAID's and avoid very strenuous or aggressive activity (speed running).     We will plan to follow up in 1 year.

## 2021-03-25 NOTE — PROGRESS NOTES
Marjorie is a 36 year old who is being evaluated via a billable telephone visit.      What phone number would you like to be contacted at?[phone]  How would you like to obtain your AVS? [facility]      GASTROENTEROLOGY FOLLOW UP TELEPHONE VISIT    CC/REFERRING MD:    La Scott    REASON FOR CONSULTATION:  RECHECK (6 m [facility] follow up)      HISTORY OF PRESENT ILLNESS:    Marjorie Lee is 36 year old female who presents for follow up.     She was initially seen in July 2019 for concerns with bloody diarrhea and which was thought to be related to ischemic colitis. Please see previous note for more detail. Briefly, she was noting multiple episodes of bloody diarrhea in April and May of 2019. At that time she was aggressively training for a marathon and was only noting symptoms after aggressive runs. We further evaluated with a colonoscopy in September 2019 which revealed multiple ulcers in rectum, sigmoid, descending and ascending colon. The scope was unable to pass hepatic flexure at that time due to significant looping and patient discomfort. Biopsies were consistent with acute inflammation likely from nsaid injury or infection. She had previously taken NSAID on one occ and discontinued. We further evaluated with stool studies at that time which were negative for infection. Her symptoms and clinical picture were more concerning for ischemic colitis. CT abd/pelvis at that time did show patent abdominopelvic arteries. She underwent a repeat colonoscopy in March 2020 which continued to show multiple ulcers in the entire examined colon as well as multiple ulcers in the terminal ileum. Biopsies were consistent with acute inflammation, no signs of chronicity. At our last follow up in September 2020 she was noting flare ups of diarrhea. We further evaluated with stool studies and an MRE in October 2020. Her infectious stool studies were negative. Her fecal calpro level was borderline. Her MRE did not show  any signs of small or large bowel inflammation.     She returns today for follow up and overall feels that she is doing well. She is having soft but regular BM's daily. She does not have any abdominal pain. She did recently have one instance of blood in the stool after an aggressive run while training for a marathon. There was no diarrhea or abd pain. She has not done an aggressive run since then and has not had any further reoccurrences. she is avoiding NSAID's. Overall she is feeling well at this time.     PMHx, PSHx, Social Hx, Family Hx have been reviewed.     PREVIOUS ENDOSCOPY:    Colonoscopy 3/5/2020  Impression:       - Hemorrhoids found on perianal exam.                             - Multiple ulcers in the entire examined colon. Biopsied. Similar to previous exam.                             - Erythematous mucosa in the ascending colon and in the cecum. Biopsied.                             - Multiple ulcers in the terminal ileum. Biopsied.      TO ORIGINAL REPORT   Status: Signed Out   Date Ordered:3/10/2020   Date Reported:3/10/2020 13:12   Signed Out By: Solange Montejo M.D., [facility], [facility]     INTERPRETATION:   ZN and PAS stains have been performed on blocks A1, B1, and C1 on account   of ileal granuloma and other foci of   loose histiocytes.  These stains are negative respectively for acid-fast   bacteria and fungi. Initial diagnosis   and comment are unchanged.     ORIGINAL REPORT:     SPECIMEN(S):   A: Ileum biopsy   B: Colon biopsy, ascending   C: Colon biopsy, descending   D: Rectal biopsy     FINAL DIAGNOSIS:   A. Ileum, Biopsy:   Ileal mucosa with focal active inflammation loose cluster of histiocytes   without true granuloma (See Comment)     B. Ascending Colon, Biopsy:   Colonic mucosa with focal neutrophilic cryptitis and granuloma formation;   no crypt architectural distortion, metaplasias, dysplasia, or other features of chronic colitis apparent (See    Comment)     C. Descending Colon,  Biopsy:   Colonic mucosa with focal neutrophilic cryptitis and loose clusters of histiocytes without true granuloma; no   crypt architectural distortion, metaplasias, dysplasia, or other features of chronic colitis apparent (See   Comment)     D. Rectal, Biopsy:   Rectal mucosa with no significant histologic abnormality     COMMENT:   Parts A to C show evidence of patchy acute inflammation, and a small granuloma in the ascending colon, but without the chronic features one expects with Crohn or ulcerative colitis.    The possibility of an infective or other reactive, (e.g. to drugs) rather than IBD etiology for diarrhea   therefore persists, as discussed at the time of earlier biopsy. Clinical correlation is required. Additional   stains (GMS, ZN/AFB) have been ordered in view of the isolated granuloma and loose histiocytes; report of findings will follow.     I have personally reviewed all specimens and/or slides, including the listed special stains, and used them   with my medical judgement to determine or confirm the final diagnosis.     Electronically signed out by:     Solange Montejo M.D., [facility], [facility]         Colonoscopy 9/3/2019  Impression:       - Multiple ulcers in the rectum, in the sigmoid colon, in the descending colon and in the ascending colon. Biopsied.                             - Tortuous colon with signifcant looping and patient discomfort. Extent reached was hepatic flexure despite increasing sedation, position changes and manual pressure.      SPECIMEN(S):   A: Colon biopsy, ascending   B: Colon biopsy, descending   C: Rectal biopsy     FINAL DIAGNOSIS:   A. COLON BIOPSY, ASCENDING:   - Colonic mucosa with focal active inflammation   - Negative for significant crypt glandular distortion, pseudo-pyloric gland metaplasia, granuloma formation and dysplasia     B. COLON BIOPSY, DESCENDING:   - Colonic mucosa with focal active inflammation   - Negative for significant crypt glandular  distortion, pseudo-pyloric gland or Paneth metaplasia, granuloma formation and dysplasia     C. RECTAL BIOPSY:   - Colonic mucosa with focal active inflammation   - Negative for significant crypt glandular distortion, pseudo-pyloric gland or Paneth metaplasia, granuloma formation and dysplasia     COMMENT:     The acute inflammation is patchy and differential includes drug effect (such as NSAIDs) versus infection.   Crohn's disease is less likely as there is no evidence of chronicity (no pseudo-pyloric gland metaplasia or   Paneth cell metaplasia on the left side).  Dr. ANDRE Garcia has reviewed parts A, B and concurs.     I have personally reviewed all specimens and/or slides, including the listed special stains, and used them   with my medical judgement to determine or confirm the final diagnosis.     Electronically signed out by:     Trino Henry M.D., [facility]       RADIOLOGY:      EXAMINATION: MR ENTEROGRAPHY WO AND W CONTRAST, 10/22/2020 3:57 PM     TECHNIQUE:  Multiplanar, multisequence MRI imaging of the abdomen and  pelvis was obtained using MR enterography protocol without and with  intravenous gadolinium. Contrast dose: Gadavist 5.5 mL     COMPARISON: CT 9/10/2019     HISTORY: Crohn's suspected, non-acute, abd pain, initial exam; ulcers  found on colonoscopy x 2, initially thought to be related to ischemic  colitis, r/o crohn's; Abnormal colonoscopy; Diarrhea, unspecified type     FINDINGS:     Exam quality: Adequate     Small and large bowel: Moderate volume stool throughout the colon. No  small or large bowel dilatation. No focal wall thickening, stricture,  or abnormal enhancement. No fistula or abscess.     Remainder of exam: The liver, spleen, adrenal glands, kidneys, and  pancreas are normal. Gallbladder is unremarkable. No bulky  lymphadenopathy. Lung bases are clear. No suspicious bony abnormality.  No abnormal T2 hyperintensity in the lung bases. Intrauterine device  is in good position.  Trace pelvic free fluid.                                                                      IMPRESSION:  1. Normal MR enterography.   2. Moderate colonic stool burden.     I have personally reviewed the examination and initial interpretation  and I agree with the findings.     PAULINE CAZARES MD      ASSESSMENT/PLAN:    1. Abnormal colonoscopy       Marjorie Lee is a 36 year old female who presents for follow up today. I have been following her since mid-2019 alec she developed bloody diarrhea after training for a marathon. Her symptoms at that time were concerning for ischemic colitis and she was evaluated with a colonoscopy which noted multiple ulcers. A CT abd/pelvis at that time did show patent abdominopelvic arteries. A repeat colonoscopy in March of 2020 however continued to show ulcers throughout the colon. It was unclear as to why she continued to have ulcers in her colon and TI on repeat colonoscopy. Her more recent work up notes a borderline fecal calpro but a normal MRE in October 2020 which was normal.     She has had one instance of rectal bleeding since our last follow up and notes that it did occur while performing a speed run during marathon training. She otherwise denies abdominal pain and diarrhea. She has not had any further reoccurrences of symptoms.     Given her recent unremarkable MRE, I think it is reasonable to continue to monitor. Advised to avoid speed runs given her history. Also continue to avoid NSAID's. Report any symptoms of concern such as another episode of rectal bleeding, abdominal pain or diarrhea. If symptoms do return will check labs and fecal calpro and consider colonoscopy. We also discussed repeating a colonoscopy in the future to reassess her previous colon findings.     Return in about 1 year (around 3/25/2022) for Follow up, using a video visit.      Cassy Young PA-C  Gastroenterology  [facility]     Phone call duration: 16 minutes

## 2021-06-10 ENCOUNTER — MYC MEDICAL ADVICE (OUTPATIENT)
Dept: FAMILY MEDICINE | Facility: CLINIC | Age: 36
End: 2021-06-10

## 2021-06-10 DIAGNOSIS — L30.9 DERMATITIS: Primary | ICD-10-CM

## 2021-06-10 RX ORDER — TRIAMCINOLONE ACETONIDE 5 MG/G
OINTMENT TOPICAL
Qty: 80 G | Refills: 1 | Status: SHIPPED | OUTPATIENT
Start: 2021-06-10 | End: 2023-04-11

## 2021-06-26 ENCOUNTER — ANCILLARY PROCEDURE (OUTPATIENT)
Dept: GENERAL RADIOLOGY | Facility: CLINIC | Age: 36
End: 2021-06-26
Attending: FAMILY MEDICINE
Payer: COMMERCIAL

## 2021-06-26 ENCOUNTER — OFFICE VISIT (OUTPATIENT)
Dept: ORTHOPEDICS | Facility: CLINIC | Age: 36
End: 2021-06-26
Payer: COMMERCIAL

## 2021-06-26 VITALS — HEIGHT: 64 IN | BODY MASS INDEX: 20.49 KG/M2 | WEIGHT: 120 LBS

## 2021-06-26 DIAGNOSIS — M79.662 PAIN IN LEFT LOWER LEG: Primary | ICD-10-CM

## 2021-06-26 DIAGNOSIS — M79.662 PAIN IN LEFT LOWER LEG: ICD-10-CM

## 2021-06-26 PROCEDURE — 73590 X-RAY EXAM OF LOWER LEG: CPT | Mod: LT | Performed by: RADIOLOGY

## 2021-06-26 PROCEDURE — 99203 OFFICE O/P NEW LOW 30 MIN: CPT | Performed by: FAMILY MEDICINE

## 2021-06-26 ASSESSMENT — MIFFLIN-ST. JEOR: SCORE: 1205.35

## 2021-06-26 NOTE — PROGRESS NOTES
[facility] CLINICS AND SURGERY CENTER  SPORTS & ORTHOPEDIC CLINIC VISIT     Jun 26, 2021        ASSESSMENT & PLAN    36-year-old female with proximal left fibular stress fracture    Reviewed imaging and assessment with patient in detail  Plan to use tall walking boot for the next month.  If she has pain while in walking boot may need period of partial or nonweightbearing.  OTC NSAIDs, Tylenol, ice as needed  Okay to remove boot when nonweightbearing.  Discussed acceptable forms of exercise during this period of rest.  Would refrain from swimming for at least 2 weeks, although discussed that she may try pulling only.  She will follow-up in clinic in 1 month with repeat images.    Miguel Lange MD  [facility] SPORTS MEDICINE CLINIC [place]    -----  Chief Complaint   Patient presents with     Consult     left lateral lower leg       SUBJECTIVE  Marjorie Lee is a/an 36 year old female who is seen as a self referral for evaluation of  left lateral proximal lower leg pain.     The patient is seen by themselves.  The patient is Right handed    Onset: 3 week(s) ago. She has been training for [employer] and for the last three weeks, she has had proximal left lateral lower leg pain.  Pain has been worse since running [place] a week ago.  She has not run since that time has continued to have pain.  Location of Pain: left lateral lower leg  Worsened by: running, weight bearing activity. She can feel pain with ankle plantar flexion and dorsiflexion.   Better with: Rest  Treatments tried: rest/activity avoidance, ice, Tylenol and compression  Associated symptoms: swelling and pain.    Orthopedic/Surgical history: No prior history of stress fracture or bone stress injury.  No significant weight changes or dietary restrictions/changes.  No changes in menses.  Social History/Occupation: NP       REVIEW OF SYSTEMS:    Do you have fever, chills, weight loss? No    Do you have any vision problems?  "No    Do you have any chest pain or edema? No    Do you have any shortness of breath or wheezing?  No    Do you have stomach problems? No    Do you have any numbness or focal weakness? No    Do you have diabetes? No    Do you have problems with bleeding or clotting? No    Do you have an rashes or other skin lesions? No    OBJECTIVE:  Ht 1.619 m (5' 3.75\")   Wt 54.4 kg (120 lb)   BMI 20.76 kg/m       General: Alert, pleasant, no distress  Left lower leg: warm, well perfused, SILT throughout     Inspection: No swelling, ecchymosis, deformity     ROM: Full ROM of the ankle and knee without pain     Strength: Intact at the ankle and knee without pain     Palpation: Point tenderness over the proximal fibula.  No TTP of the tibial shaft or about the knee.     Special Tests: Pain with single-leg hop      RADIOLOGY:    2 view xrays of left tib-fib performed and reviewed independently demonstrating fracture of the proximal tibia, nondisplaced. See EMR for formal radiology report.              "

## 2021-06-26 NOTE — LETTER
6/26/2021      RE: Marjorie Lee  [address]  [facility] [address]         [facility] CLINICS AND SURGERY CENTER  SPORTS & ORTHOPEDIC CLINIC VISIT     Jun 26, 2021        ASSESSMENT & PLAN    36-year-old female with proximal left fibular stress fracture    Reviewed imaging and assessment with patient in detail  Plan to use tall walking boot for the next month.  If she has pain while in walking boot may need period of partial or nonweightbearing.  OTC NSAIDs, Tylenol, ice as needed  Okay to remove boot when nonweightbearing.  Discussed acceptable forms of exercise during this period of rest.  Would refrain from swimming for at least 2 weeks, although discussed that she may try pulling only.  She will follow-up in clinic in 1 month with repeat images.    Miguel Lange MD  [facility] SPORTS MEDICINE CLINIC [place]    -----  Chief Complaint   Patient presents with     Consult     left lateral lower leg       SUBJECTIVE  Marjorie Lee is a/an 36 year old female who is seen as a self referral for evaluation of  left lateral proximal lower leg pain.     The patient is seen by themselves.  The patient is Right handed    Onset: 3 week(s) ago. She has been training for [employer] and for the last three weeks, she has had proximal left lateral lower leg pain.  Pain has been worse since running [employer] a week ago.  She has not run since that time has continued to have pain.  Location of Pain: left lateral lower leg  Worsened by: running, weight bearing activity. She can feel pain with ankle plantar flexion and dorsiflexion.   Better with: Rest  Treatments tried: rest/activity avoidance, ice, Tylenol and compression  Associated symptoms: swelling and pain.    Orthopedic/Surgical history: No prior history of stress fracture or bone stress injury.  No significant weight changes or dietary restrictions/changes.  No changes in menses.  Social History/Occupation: NP       REVIEW OF  "SYSTEMS:    Do you have fever, chills, weight loss? No    Do you have any vision problems? No    Do you have any chest pain or edema? No    Do you have any shortness of breath or wheezing?  No    Do you have stomach problems? No    Do you have any numbness or focal weakness? No    Do you have diabetes? No    Do you have problems with bleeding or clotting? No    Do you have an rashes or other skin lesions? No    OBJECTIVE:  Ht 1.619 m (5' 3.75\")   Wt 54.4 kg (120 lb)   BMI 20.76 kg/m       General: Alert, pleasant, no distress  Left lower leg: warm, well perfused, SILT throughout     Inspection: No swelling, ecchymosis, deformity     ROM: Full ROM of the ankle and knee without pain     Strength: Intact at the ankle and knee without pain     Palpation: Point tenderness over the proximal fibula.  No TTP of the tibial shaft or about the knee.     Special Tests: Pain with single-leg hop      RADIOLOGY:    2 view xrays of left tib-fib performed and reviewed independently demonstrating fracture of the proximal tibia, nondisplaced. See EMR for formal radiology report.         Miguel Lange MD    "

## 2021-07-21 DIAGNOSIS — M79.662 PAIN IN LEFT LOWER LEG: Primary | ICD-10-CM

## 2021-07-22 ENCOUNTER — ANCILLARY PROCEDURE (OUTPATIENT)
Dept: GENERAL RADIOLOGY | Facility: CLINIC | Age: 36
End: 2021-07-22
Attending: FAMILY MEDICINE
Payer: COMMERCIAL

## 2021-07-22 ENCOUNTER — OFFICE VISIT (OUTPATIENT)
Dept: ORTHOPEDICS | Facility: CLINIC | Age: 36
End: 2021-07-22
Payer: COMMERCIAL

## 2021-07-22 DIAGNOSIS — M84.364D STRESS FRACTURE OF LEFT FIBULA WITH ROUTINE HEALING, SUBSEQUENT ENCOUNTER: Primary | ICD-10-CM

## 2021-07-22 PROCEDURE — 73590 X-RAY EXAM OF LOWER LEG: CPT | Mod: LT | Performed by: RADIOLOGY

## 2021-07-22 PROCEDURE — 99213 OFFICE O/P EST LOW 20 MIN: CPT | Performed by: FAMILY MEDICINE

## 2021-07-22 NOTE — PROGRESS NOTES
[facility] AND SURGERY CENTER  SPORTS & ORTHOPEDIC CLINIC VISIT     Jul 22, 2021        ASSESSMENT & PLAN    28-year-old 4-week status post proximal fibula fracture suspected to be related to stress mechanism.  Doing very well after 1 month in boot with signs of interval healing on x-ray.    Reviewed imaging and assessment with patient in detail  Okay to gradually transition out of the boot into weightbearing out of boot at this time.  Discussed this process in detail.  Have recommended that she can begin low impact exercise such as biking or swimming if she has not done this already.  Would not begin return to running until she has been able to walk for a week out of the boot without pain.  She was provided with a PT referral that can help guide us through this process.      Miguel Lange MD  [facility] SPORTS MEDICINE CLINIC [place]    -----  Chief Complaint   Patient presents with     Left Lower Leg - Follow Up       SUBJECTIVE  Marjorie Lee is a/an 36 year old female who is seen for follow up of left proximal fibular stress fracture.  For the first week she had increased pain though this is improved considerably since that time.    The patient is seen by themselves.    Date of injury: Beginning of June 2021  Date of Last Visit: 6/26/21   Symptoms: improved  Worsened by: Walking, being on feet for the whole day   Better with: NA   Treatment to date: Walking boot   Associated symptoms: no distal numbness or tingling; denies swelling or warmth        REVIEW OF SYSTEMS:    See HPI     OBJECTIVE:  There were no vitals taken for this visit.     General: Alert, pleasant, no distress  Left lower extremity: Able to stand and walk out of the boot without significant pain or considerable limp.  There is no swelling.  Cap refill brisk.  Sensation intact light touch throughout.  No TTP to the proximal fibula or tibia.    RADIOLOGY:    2 view xrays of left tibia and fibula performed and reviewed  independently demonstrating fracture of the proximal left fibula with interval callus formation. Alignment stable. See EMR for formal radiology report.

## 2021-07-22 NOTE — LETTER
7/22/2021      RE: Marjorie Lee  [address]  [place] [facility] [address]         [facility] CLINICS AND SURGERY CENTER  SPORTS & ORTHOPEDIC CLINIC VISIT     Jul 22, 2021        ASSESSMENT & PLAN    28-year-old 4-week status post proximal fibula fracture suspected to be related to stress mechanism.  Doing very well after 1 month in boot with signs of interval healing on x-ray.    Reviewed imaging and assessment with patient in detail  Okay to gradually transition out of the boot into weightbearing out of boot at this time.  Discussed this process in detail.  Have recommended that she can begin low impact exercise such as biking or swimming if she has not done this already.  Would not begin return to running until she has been able to walk for a week out of the boot without pain.  She was provided with a PT referral that can help guide us through this process.      Miguel Lange MD  [facility] SPORTS MEDICINE [facility]    -----  Chief Complaint   Patient presents with     Left Lower Leg - Follow Up       SUBJECTIVE  Marjorie Lee is a/an 36 year old female who is seen for follow up of left proximal fibular stress fracture.  For the first week she had increased pain though this is improved considerably since that time.    The patient is seen by themselves.    Date of injury: Beginning of June 2021  Date of Last Visit: 6/26/21   Symptoms: improved  Worsened by: Walking, being on feet for the whole day   Better with: NA   Treatment to date: Walking boot   Associated symptoms: no distal numbness or tingling; denies swelling or warmth        REVIEW OF SYSTEMS:    See HPI     OBJECTIVE:  There were no vitals taken for this visit.     General: Alert, pleasant, no distress  Left lower extremity: Able to stand and walk out of the boot without significant pain or considerable limp.  There is no swelling.  Cap refill brisk.  Sensation intact light touch throughout.  No TTP to the proximal fibula or  tibia.    RADIOLOGY:    2 view xrays of left tibia and fibula performed and reviewed independently demonstrating fracture of the proximal left fibula with interval callus formation. Alignment stable. See EMR for formal radiology report.           Miguel Lange MD

## 2021-08-11 ENCOUNTER — THERAPY VISIT (OUTPATIENT)
Dept: PHYSICAL THERAPY | Facility: CLINIC | Age: 36
End: 2021-08-11
Payer: COMMERCIAL

## 2021-08-11 DIAGNOSIS — M84.364D STRESS FRACTURE OF LEFT FIBULA WITH ROUTINE HEALING, SUBSEQUENT ENCOUNTER: ICD-10-CM

## 2021-08-11 PROCEDURE — 97110 THERAPEUTIC EXERCISES: CPT | Mod: GP | Performed by: PHYSICAL THERAPIST

## 2021-08-11 PROCEDURE — 97161 PT EVAL LOW COMPLEX 20 MIN: CPT | Mod: GP | Performed by: PHYSICAL THERAPIST

## 2021-08-11 NOTE — PROGRESS NOTES
Physical Therapy Initial Evaluation  Subjective:    Patient Health History  Marjorie Lee being seen for Left fibula fracture.  Would also like to discuss piriformis and right shoulder if there is time.     Problem began: 6/6/2021.   Problem occurred: Stress fracture left fibula started 6/6 but completed 6/19 during marathon   Pain is reported as 3/10 on pain scale.  General health as reported by patient is excellent.  Pertinent medical history includes: history of fractures.     Medical allergies: none.   Surgeries include:  Other. Other surgery history details: Chalazion removal.    Current medications:  Other. Other medications details: Multivitamin, vitamin D, tumeric.    Current occupation is Nurse practitioner.   Primary job tasks include:  Computer work.                                    Objective:  System    Ankle/Foot Evaluation  ROM:  Arom ankle eval: Knee to wall Test ROM: L 11 cm, R 12 cm.  AROM:    Dorsiflexion:  Left:   18  Right:   18  Plantarflexion:  Left:  46    Right:  42            Strength:    Dorsiflexion:  Left: 5/5      Pain:-   Right: 5/5    Pain:-  Plantarflexion: Left: 5/5    Pain:-   Right: 5/5   Pain:-                  Strength wnl ankle: Able to perform 20 DL calf raises no pain full ROM. 15 Single leg calf raises no pain, decreased stability on L compared to R.                                                              [facility]    [facility] Physical Therapy Initial Evaluation  8/11/2021     Precautions/Restrictions/MD instructions: Initiate running when 7 days pain free  Order Date: 7/22/21    Subjective:   Chief Complaint: Pt sustained a L proximal fibular stress fracture this past June while training for a marathon. She did complete the marathon but was in so much pain could hardly walk. She was in walking boot 4 weeks and  recently discontinued use of her walking boot. Having some pain at end of the day more of an ache but this has improved in the  last day or two. Is swimming and performing yoga and hard to tell if feels more sore or better following this. Does like to weight train and is doing more body weight exercises currently.     Patient's Goal(s): Return to running and exercise without reinjury    Assessment/Plan:    Patient is a 36 year old female with left side ankle complaints.    Patient has the following significant findings with corresponding treatment plan.                Diagnosis 1:  L proximal stress fracture  Pain -  education  Decreased joint mobility - manual therapy and therapeutic exercise  Decreased strength - therapeutic exercise and therapeutic activities  Impaired balance - neuro re-education and therapeutic activities  Decreased function - therapeutic activities    Cumulative Therapy Evaluation is: Low complexity.    Previous and current functional limitations:  (See Goal Flow Sheet for this information)    Short term and Long term goals: (See Goal Flow Sheet for this information)     Communication ability:  Patient appears to be able to clearly communicate and understand verbal and written communication and follow directions correctly.  Treatment Explanation - The following has been discussed with the patient:   RX ordered/plan of care  Anticipated outcomes  Possible risks and side effects  This patient would benefit from PT intervention to resume normal activities.   Rehab potential is excellent.    Frequency:  1 X week, once daily  Duration:  for 2 months  Discharge Plan:  Achieve all LTG.  Independent in home treatment program.  Reach maximal therapeutic benefit.    Please refer to the daily flowsheet for treatment today, total treatment time and time spent performing 1:1 timed codes.

## 2021-09-12 ENCOUNTER — MYC MEDICAL ADVICE (OUTPATIENT)
Dept: FAMILY MEDICINE | Facility: CLINIC | Age: 36
End: 2021-09-12

## 2021-09-12 DIAGNOSIS — Z13.1 SCREENING FOR DIABETES MELLITUS: ICD-10-CM

## 2021-09-12 DIAGNOSIS — R79.89 ELEVATED TSH: ICD-10-CM

## 2021-09-12 DIAGNOSIS — R63.8 ALTERATION IN NUTRITION: ICD-10-CM

## 2021-09-12 DIAGNOSIS — Z13.6 CARDIOVASCULAR SCREENING; LDL GOAL LESS THAN 160: Primary | ICD-10-CM

## 2021-09-30 ENCOUNTER — LAB (OUTPATIENT)
Dept: LAB | Facility: CLINIC | Age: 36
End: 2021-09-30
Payer: COMMERCIAL

## 2021-09-30 DIAGNOSIS — R63.8 ALTERATION IN NUTRITION: ICD-10-CM

## 2021-09-30 DIAGNOSIS — Z13.6 CARDIOVASCULAR SCREENING; LDL GOAL LESS THAN 160: ICD-10-CM

## 2021-09-30 DIAGNOSIS — Z13.1 SCREENING FOR DIABETES MELLITUS: ICD-10-CM

## 2021-09-30 DIAGNOSIS — R79.89 ELEVATED TSH: ICD-10-CM

## 2021-09-30 LAB
ALBUMIN SERPL-MCNC: 3.8 G/DL (ref 3.4–5)
ALP SERPL-CCNC: 71 U/L (ref 40–150)
ALT SERPL W P-5'-P-CCNC: 25 U/L (ref 0–50)
ANION GAP SERPL CALCULATED.3IONS-SCNC: 8 MMOL/L (ref 3–14)
AST SERPL W P-5'-P-CCNC: 14 U/L (ref 0–45)
BILIRUB SERPL-MCNC: 0.4 MG/DL (ref 0.2–1.3)
BUN SERPL-MCNC: 8 MG/DL (ref 7–30)
CALCIUM SERPL-MCNC: 8.9 MG/DL (ref 8.5–10.1)
CHLORIDE BLD-SCNC: 107 MMOL/L (ref 94–109)
CHOLEST SERPL-MCNC: 128 MG/DL
CO2 SERPL-SCNC: 26 MMOL/L (ref 20–32)
CREAT SERPL-MCNC: 0.69 MG/DL (ref 0.52–1.04)
ERYTHROCYTE [DISTWIDTH] IN BLOOD BY AUTOMATED COUNT: 12.2 % (ref 10–15)
FASTING STATUS PATIENT QL REPORTED: YES
FERRITIN SERPL-MCNC: 39 NG/ML (ref 12–150)
GFR SERPL CREATININE-BSD FRML MDRD: >90 ML/MIN/1.73M2
GLUCOSE BLD-MCNC: 88 MG/DL (ref 70–99)
HCT VFR BLD AUTO: 41.1 % (ref 35–47)
HDLC SERPL-MCNC: 63 MG/DL
HGB BLD-MCNC: 13.3 G/DL (ref 11.7–15.7)
LDLC SERPL CALC-MCNC: 57 MG/DL
MCH RBC QN AUTO: 31.2 PG (ref 26.5–33)
MCHC RBC AUTO-ENTMCNC: 32.4 G/DL (ref 31.5–36.5)
MCV RBC AUTO: 97 FL (ref 78–100)
NONHDLC SERPL-MCNC: 65 MG/DL
PLATELET # BLD AUTO: 235 10E3/UL (ref 150–450)
POTASSIUM BLD-SCNC: 4.1 MMOL/L (ref 3.4–5.3)
PROT SERPL-MCNC: 7.7 G/DL (ref 6.8–8.8)
RBC # BLD AUTO: 4.26 10E6/UL (ref 3.8–5.2)
SODIUM SERPL-SCNC: 141 MMOL/L (ref 133–144)
TRIGL SERPL-MCNC: 41 MG/DL
TSH SERPL DL<=0.005 MIU/L-ACNC: 3.23 MU/L (ref 0.4–4)
VIT B12 SERPL-MCNC: 1166 PG/ML (ref 193–986)
WBC # BLD AUTO: 4.6 10E3/UL (ref 4–11)

## 2021-09-30 PROCEDURE — 36415 COLL VENOUS BLD VENIPUNCTURE: CPT

## 2021-09-30 PROCEDURE — 82728 ASSAY OF FERRITIN: CPT

## 2021-09-30 PROCEDURE — 82607 VITAMIN B-12: CPT

## 2021-09-30 PROCEDURE — 85027 COMPLETE CBC AUTOMATED: CPT

## 2021-09-30 PROCEDURE — 84443 ASSAY THYROID STIM HORMONE: CPT

## 2021-09-30 PROCEDURE — 80061 LIPID PANEL: CPT

## 2021-09-30 PROCEDURE — 80053 COMPREHEN METABOLIC PANEL: CPT

## 2021-10-04 ASSESSMENT — ENCOUNTER SYMPTOMS
NERVOUS/ANXIOUS: 1
CHILLS: 0
HEARTBURN: 0
DIZZINESS: 0
HEMATOCHEZIA: 0
FEVER: 0
FREQUENCY: 0
HEMATURIA: 0
JOINT SWELLING: 0
COUGH: 0
WEAKNESS: 0
SORE THROAT: 0
EYE PAIN: 0
PARESTHESIAS: 0
ABDOMINAL PAIN: 0
DIARRHEA: 0
PALPITATIONS: 0
CONSTIPATION: 0
NAUSEA: 0
ARTHRALGIAS: 0
BREAST MASS: 0
SHORTNESS OF BREATH: 0
HEADACHES: 0
DYSURIA: 0
MYALGIAS: 1

## 2021-10-05 NOTE — PROGRESS NOTES
SUBJECTIVE:   CC: Marjorie Lee is an 36 year old woman who presents for preventive health visit.       Patient has been advised of split billing requirements and indicates understanding: Yes  Healthy Habits:     Getting at least 3 servings of Calcium per day:  Yes    Bi-annual eye exam:  Yes    Dental care twice a year:  Yes    Sleep apnea or symptoms of sleep apnea:  None    Diet:  Regular (no restrictions) and Other    Frequency of exercise:  6-7 days/week    Duration of exercise:  45-60 minutes    Taking medications regularly:  No    Medication side effects:  None    PHQ-2 Total Score: 0    Additional concerns today:  No      No concerns today.  Ran [employer] recently.  Dealt with stress fracture after that.  Healed now.  No health concerns.     Today's PHQ-2 Score:   PHQ-2 ( 1999 Pf[employer]) 10/4/2021   Q1: Little interest or pleasure in doing things 0   Q2: Feeling down, depressed or hopeless 0   PHQ-2 Score 0   Q1: Little interest or pleasure in doing things Not at all   Q2: Feeling down, depressed or hopeless Not at all   PHQ-2 Score 0       Abuse: Current or Past (Physical, Sexual or Emotional) - No  Do you feel safe in your environment? Yes    Have you ever done Advance Care Planning? (For example, a Health Directive, POLST, or a discussion with a medical provider or your loved ones about your wishes): Yes, advance care planning is on file.    Social History     Tobacco Use     Smoking status: Never Smoker     Smokeless tobacco: Never Used   Substance Use Topics     Alcohol use: Yes     Comment: occ         Alcohol Use 10/4/2021   Prescreen: >3 drinks/day or >7 drinks/week? No   Prescreen: >3 drinks/day or >7 drinks/week? -   AUDIT SCORE  -       Reviewed orders with patient.  Reviewed health maintenance and updated orders accordingly - Yes  BP Readings from Last 3 Encounters:   10/06/21 119/72   12/31/20 104/66   10/14/20 138/75    Wt Readings from Last 3 Encounters:   10/06/21 52.6 kg (116  lb)   06/26/21 54.4 kg (120 lb)   12/31/20 54.4 kg (120 lb)                  Patient Active Problem List   Diagnosis     IUD (intrauterine device) in place     Abnormal colonoscopy     Rectal bleeding     Past Surgical History:   Procedure Laterality Date     COLONOSCOPY N/A 9/3/2019    Procedure: Colonoscopy, With Polypectomy And Biopsy;  Surgeon: Radha Neri DO;  Location: [facility]     COLONOSCOPY N/A 3/5/2020    Procedure: Colonoscopy, With Polypectomy And Biopsy;  Surgeon: Radha Neri DO;  Location: [facility]     COLONOSCOPY WITH CO2 INSUFFLATION N/A 9/3/2019    Procedure: COLONOSCOPY, WITH CO2 INSUFFLATION;  Surgeon: Radha Neri DO;  Location: [facility]     COLONOSCOPY WITH CO2 INSUFFLATION N/A 3/5/2020    Procedure: COLONOSCOPY, WITH CO2 INSUFFLATION;  Surgeon: Radha Neri DO;  Location: [facility]       Social History     Tobacco Use     Smoking status: Never Smoker     Smokeless tobacco: Never Used   Substance Use Topics     Alcohol use: Yes     Comment: occ     Family History   Problem Relation Age of Onset     Osteoarthritis Mother      Osteoarthritis Father      Breast Cancer Maternal Aunt      Other - See Comments Paternal Uncle         glioblastoma     Other - See Comments Paternal Uncle         glioblastoma     Colon Cancer Maternal Grandmother          Current Outpatient Medications   Medication Sig Dispense Refill     Cholecalciferol (VITAMIN D3) 50 MCG (2000 UT) CAPS Take 1 capsule by mouth 3 times a week       levonorgestrel (MIRENA) 20 MCG/24HR IUD 1 each (20 mcg) by Intrauterine route continuous       Multiple Vitamins-Minerals (MULTIVITAMIN PO) Take 1 tablet by mouth daily        triamcinolone (KENALOG) 0.5 % external ointment Apply in thin layer to affected areas twice a day.  Use for no longer than two weeks at a time. 80 g 1     Allergies   Allergen Reactions     No Known Allergies        Breast Cancer Screening:    FHS-7:   Breast CA Risk Assessment (FHS-7) 10/4/2021   Did any of  your first-degree relatives have breast or ovarian cancer? No   Did any of your relatives have bilateral breast cancer? No   Did any man in your family have breast cancer? No   Did any woman in your family have breast and ovarian cancer? No   Did any woman in your family have breast cancer before age 50 y? Yes   Do you have 2 or more relatives with breast and/or ovarian cancer? No   Do you have 2 or more relatives with breast and/or bowel cancer? Yes       Patient under 40 years of age: Routine Mammogram Screening not recommended.   Pertinent mammograms are reviewed under the imaging tab.    History of abnormal Pap smear: NO - age 30-65 PAP every 5 years with negative HPV co-testing recommended     Reviewed and updated as needed this visit by clinical staff  Tobacco  Allergies  Meds  Problems             Reviewed and updated as needed this visit by Provider                Past Medical History:   Diagnosis Date     NO ACTIVE PROBLEMS       Past Surgical History:   Procedure Laterality Date     COLONOSCOPY N/A 9/3/2019    Procedure: Colonoscopy, With Polypectomy And Biopsy;  Surgeon: Radha Neri DO;  Location: [facility]     COLONOSCOPY N/A 3/5/2020    Procedure: Colonoscopy, With Polypectomy And Biopsy;  Surgeon: Radha Neri DO;  Location: [facility]     COLONOSCOPY WITH CO2 INSUFFLATION N/A 9/3/2019    Procedure: COLONOSCOPY, WITH CO2 INSUFFLATION;  Surgeon: Radha Neri DO;  Location: [facility]     COLONOSCOPY WITH CO2 INSUFFLATION N/A 3/5/2020    Procedure: COLONOSCOPY, WITH CO2 INSUFFLATION;  Surgeon: Radha Neri DO;  Location: [facility]     OB History    Para Term  AB Living   2 0 0 0 0 2   SAB TAB Ectopic Multiple Live Births   0 0 0 0 2      # Outcome Date GA Lbr Hardy/2nd Weight Sex Delivery Anes PTL Lv   2      F    MALATHI   1  2010        MALATHI       Review of Systems   Constitutional: Negative for chills and fever.   HENT: Negative for congestion, ear pain, hearing  "loss and sore throat.    Eyes: Negative for pain and visual disturbance.   Respiratory: Negative for cough and shortness of breath.    Cardiovascular: Negative for chest pain, palpitations and peripheral edema.   Gastrointestinal: Negative for abdominal pain, constipation, diarrhea, heartburn, hematochezia and nausea.   Breasts:  Negative for tenderness, breast mass and discharge.   Genitourinary: Negative for dysuria, frequency, genital sores, hematuria, pelvic pain, urgency, vaginal bleeding and vaginal discharge.   Musculoskeletal: Positive for myalgias. Negative for arthralgias and joint swelling.   Skin: Negative for rash.   Neurological: Negative for dizziness, weakness, headaches and paresthesias.   Psychiatric/Behavioral: Negative for mood changes. The patient is nervous/anxious.           OBJECTIVE:   /72 (BP Location: Left arm, Patient Position: Chair, Cuff Size: Adult Regular)   Pulse 77   Temp 98.5  F (36.9  C) (Tympanic)   Ht 1.619 m (5' 3.75\")   Wt 52.6 kg (116 lb)   SpO2 99%   BMI 20.07 kg/m    Physical Exam  GENERAL: healthy, alert and no distress  EYES: Eyes grossly normal to inspection, PERRL and conjunctivae and sclerae normal  HENT: ear canals and TM's normal, nose and mouth without ulcers or lesions  NECK: no adenopathy, no asymmetry, masses, or scars and thyroid normal to palpation  RESP: lungs clear to auscultation - no rales, rhonchi or wheezes  BREAST: normal without masses, tenderness or nipple discharge and no palpable axillary masses or adenopathy  CV: regular rate and rhythm, normal S1 S2, no S3 or S4, no murmur, click or rub, no peripheral edema and peripheral pulses strong  ABDOMEN: soft, nontender, no hepatosplenomegaly, no masses and bowel sounds normal  MS: no gross musculoskeletal defects noted, no edema  SKIN: no suspicious lesions or rashes  NEURO: Normal strength and tone, mentation intact and speech normal  PSYCH: mentation appears normal, affect " "normal/bright    Diagnostic Test Results:  Labs reviewed in Epic    ASSESSMENT/PLAN:       ICD-10-CM    1. Routine general medical examination at a health care facility  Z00.00      No concerns.  Follow up one year or sooner if needed.   Patient has been advised of split billing requirements and indicates understanding: Yes  COUNSELING:  Reviewed preventive health counseling, as reflected in patient instructions       Regular exercise       Healthy diet/nutrition       Vision screening       Osteoporosis prevention/bone health    Estimated body mass index is 20.07 kg/m  as calculated from the following:    Height as of this encounter: 1.619 m (5' 3.75\").    Weight as of this encounter: 52.6 kg (116 lb).        She reports that she has never smoked. She has never used smokeless tobacco.      Counseling Resources:  ATP IV Guidelines  Pooled Cohorts Equation Calculator  Breast Cancer Risk Calculator  BRCA-Related Cancer Risk Assessment: FHS-7 Tool  FRAX Risk Assessment  ICSI Preventive Guidelines  Dietary Guidelines for Americans, 2010  USDA's MyPlate  ASA Prophylaxis  Lung CA Screening    MARCY Carlin CNP  [facility]  "

## 2021-10-05 NOTE — PATIENT INSTRUCTIONS
Preventive Health Recommendations  Female Ages 26 - 39  Yearly exam:   See your health care provider every year in order to    Review health changes.     Discuss preventive care.      Review your medicines if you your doctor has prescribed any.    Until age 30: Get a Pap test every three years (more often if you have had an abnormal result).    After age 30: Talk to your doctor about whether you should have a Pap test every 3 years or have a Pap test with HPV screening every 5 years.   You do not need a Pap test if your uterus was removed (hysterectomy) and you have not had cancer.  You should be tested each year for STDs (sexually transmitted diseases), if you're at risk.   Talk to your provider about how often to have your cholesterol checked.  If you are at risk for diabetes, you should have a diabetes test (fasting glucose).  Shots: Get a flu shot each year. Get a tetanus shot every 10 years.   Nutrition:     Eat at least 5 servings of fruits and vegetables each day.    Eat whole-grain bread, whole-wheat pasta and brown rice instead of white grains and rice.    Get adequate Calcium and Vitamin D.     Lifestyle    Exercise at least 150 minutes a week (30 minutes a day, 5 days of the week). This will help you control your weight and prevent disease.    Limit alcohol to one drink per day.    No smoking.     Wear sunscreen to prevent skin cancer.    See your dentist every six months for an exam and cleaning.  At [facility], we strive to deliver an exceptional experience to you, every time we see you. If you receive a survey, please complete it as we do value your feedback.  If you have HTPhart, you can expect to receive results automatically within 24 hours of their completion.  Your provider will send a note interpreting your results as well.   If you do not have MyChart, you should receive your results in about a week by mail.    Your care team:                            Family  Medicine Internal Medicine   MD Michael Sanders MD Shantel Branch-Fleming, MD Srinivasa Vaka, MD Katya Belousova, PAPRICILLA Acevedo, APRJORDEN Baugh, MD Pediatrics   Raul Piedra, PAPRICILLA Scott, MD Kaitlynn Rosa APRN CNP   MD Nilda Mock MD Deborah Mielke, MD Kim Thein, APRN [facility]      Clinic hours: Monday - Thursday 7 am-6 pm; Fridays 7 am-5 pm.   Urgent care: Monday - Friday 10 am- 8 pm; Saturday and Sunday 9 am-5 pm.    Clinic: [phone]       [facility]: Monday - Thursday 8 am - 7 pm; Friday 8 am - 6 pm  [facility]: [phone]     Use www.[email or web address].org for 24/7 diagnosis and treatment of dozens of conditions.

## 2021-10-06 ENCOUNTER — OFFICE VISIT (OUTPATIENT)
Dept: FAMILY MEDICINE | Facility: CLINIC | Age: 36
End: 2021-10-06
Payer: COMMERCIAL

## 2021-10-06 VITALS
HEIGHT: 64 IN | SYSTOLIC BLOOD PRESSURE: 119 MMHG | TEMPERATURE: 98.5 F | BODY MASS INDEX: 19.81 KG/M2 | HEART RATE: 77 BPM | DIASTOLIC BLOOD PRESSURE: 72 MMHG | OXYGEN SATURATION: 99 % | WEIGHT: 116 LBS

## 2021-10-06 DIAGNOSIS — Z00.00 ROUTINE GENERAL MEDICAL EXAMINATION AT A HEALTH CARE FACILITY: Primary | ICD-10-CM

## 2021-10-06 PROCEDURE — 99395 PREV VISIT EST AGE 18-39: CPT | Performed by: NURSE PRACTITIONER

## 2021-10-06 ASSESSMENT — ENCOUNTER SYMPTOMS
DIARRHEA: 0
ARTHRALGIAS: 0
HEMATOCHEZIA: 0
WEAKNESS: 0
SHORTNESS OF BREATH: 0
HEMATURIA: 0
EYE PAIN: 0
ABDOMINAL PAIN: 0
BREAST MASS: 0
DIZZINESS: 0
MYALGIAS: 1
DYSURIA: 0
COUGH: 0
FREQUENCY: 0
FEVER: 0
SORE THROAT: 0
HEADACHES: 0
NAUSEA: 0
NERVOUS/ANXIOUS: 1
PARESTHESIAS: 0
CHILLS: 0
PALPITATIONS: 0
JOINT SWELLING: 0
HEARTBURN: 0
CONSTIPATION: 0

## 2021-10-06 ASSESSMENT — MIFFLIN-ST. JEOR: SCORE: 1187.2

## 2021-10-06 ASSESSMENT — PAIN SCALES - GENERAL: PAINLEVEL: NO PAIN (0)

## 2021-10-10 ENCOUNTER — HEALTH MAINTENANCE LETTER (OUTPATIENT)
Age: 36
End: 2021-10-10

## 2022-01-10 ENCOUNTER — TELEPHONE (OUTPATIENT)
Dept: GASTROENTEROLOGY | Facility: CLINIC | Age: 37
End: 2022-01-10
Payer: COMMERCIAL

## 2022-01-10 NOTE — TELEPHONE ENCOUNTER
1/10 provided patient with callback number [phone]. Return in about 1 year (around 3/25/2022) for Follow up, using a video visit.     Sarai Aparicio  Procedure    Cardiology, Rheumatology, GI, Pulmonology, Nephrology Specialties   [facility]  [phone]

## 2022-02-02 ENCOUNTER — E-VISIT (OUTPATIENT)
Dept: FAMILY MEDICINE | Facility: CLINIC | Age: 37
End: 2022-02-02
Payer: COMMERCIAL

## 2022-02-02 ENCOUNTER — MYC MEDICAL ADVICE (OUTPATIENT)
Dept: FAMILY MEDICINE | Facility: CLINIC | Age: 37
End: 2022-02-02

## 2022-02-02 DIAGNOSIS — B82.0 ROUNDWORM INFECTION: Primary | ICD-10-CM

## 2022-02-02 PROCEDURE — 99421 OL DIG E/M SVC 5-10 MIN: CPT | Performed by: NURSE PRACTITIONER

## 2022-02-03 RX ORDER — IVERMECTIN 3 MG/1
10 TABLET ORAL ONCE
Qty: 4 TABLET | Refills: 0 | Status: SHIPPED | OUTPATIENT
Start: 2022-02-03 | End: 2022-02-03

## 2022-02-03 NOTE — TELEPHONE ENCOUNTER
Routing to provider as FYI from patient. Please see the attachments from patient.     Liss Jay RN, BSN  [facility]

## 2022-02-04 NOTE — PATIENT INSTRUCTIONS
Thank you for choosing us for your care. I have placed an order for a prescription so that you can start treatment. View your full visit summary for details by clicking on the link below. Your pharmacist will able to address any questions you may have about the medication.     If you're not feeling better within 5-7 days, please schedule an appointment.  You can schedule an appointment right here in [facility], or call [phone]  If the visit is for the same symptoms as your eVisit, we'll refund the cost of your eVisit if seen within seven days.

## 2022-03-16 DIAGNOSIS — H00.011 HORDEOLUM EXTERNUM OF RIGHT UPPER EYELID: Primary | ICD-10-CM

## 2022-03-16 RX ORDER — ERYTHROMYCIN 5 MG/G
0.5 OINTMENT OPHTHALMIC 2 TIMES DAILY
Qty: 3.5 G | Refills: 0 | Status: SHIPPED | OUTPATIENT
Start: 2022-03-16 | End: 2022-06-16

## 2022-04-07 ENCOUNTER — THERAPY VISIT (OUTPATIENT)
Dept: PHYSICAL THERAPY | Facility: CLINIC | Age: 37
End: 2022-04-07
Payer: COMMERCIAL

## 2022-04-07 DIAGNOSIS — G57.01 PIRIFORMIS SYNDROME, RIGHT: ICD-10-CM

## 2022-04-07 DIAGNOSIS — M99.05 SOMATIC DYSFUNCTION OF PELVIS REGION: ICD-10-CM

## 2022-04-07 DIAGNOSIS — N39.41 URGE INCONTINENCE OF URINE: ICD-10-CM

## 2022-04-07 PROCEDURE — 97110 THERAPEUTIC EXERCISES: CPT | Mod: GP | Performed by: PHYSICAL THERAPIST

## 2022-04-07 PROCEDURE — 97112 NEUROMUSCULAR REEDUCATION: CPT | Mod: GP | Performed by: PHYSICAL THERAPIST

## 2022-04-07 PROCEDURE — 97530 THERAPEUTIC ACTIVITIES: CPT | Mod: GP | Performed by: PHYSICAL THERAPIST

## 2022-04-07 PROCEDURE — 97161 PT EVAL LOW COMPLEX 20 MIN: CPT | Mod: GP | Performed by: PHYSICAL THERAPIST

## 2022-04-07 NOTE — PROGRESS NOTES
SUBJECTIVE:  Patient reports onset of symptoms right piriformis pain with running, off and on for a few years, also urge incontinence.Symptoms include leaking with urge, pain in the right glut, sharp at times with sneeze or after running.  Since onset symptoms have been getting better, worse or staying the same? worse    Urination:  Do you leak on the way to the bathroom or with a strong urge to void? Yes    Do you leak with cough,sneeze, jumping, running?No   Any other activities that cause leaking? No   Do you have triggers that make you feel you can't wait to go to the bathroom? Close to bathroom or pulling into the garage.  Type of pad and number used per day? none  When you leak what is the amount? Small drops    How long can you delay the need to urinate? 11 - 30 minutes.   How many times do you get up to urinate at night? 0-1    Can you stop the flow of urine when on the toilet? Yes  Is the volume of urine passed usually: medium. (8sec rule=  250ml with average bladder storing  400-600ml)    Do you strain to pass urine? No  Do you have a slow or hesitant urinary stream? No  Do you have difficulty initiating the urine stream? No  Is urination painful?  No    How many bladder infections have you had in last 12 months?0    Fluid intake(one glass is 8oz or one cup) 2-3 liters water, 2caffinated glasses/day  0 alcohol glasses/day.    Bowel habits:  Frequency of bowel movements? 1-2 a day  Consistancy of stool? soft, soft formed, Do you ignore the urge to defecate? No  Do you strain to pass stool? No    Pelvic Pain:  Do you have any pelvic pain with intercourse, exams, use of tampons? No  Is initial penetration during intercourse painful? No  Is deeper penetration painful? sometimes  Do you use lubricant?   Yes What kind? oil      Given birth? Yes Any complications? Use of foceps  # of vaginal delieveries?2   # of C-sections?0  # of episiotomies?0.  Are you sexually active?Yes  Have you ever been worried for your  physical safety? No  Any abdominal or pelvic surgeries? no  Are you having any regular exercise?yes, running and weights  Have you practiced the PF(kegel) exercises for 4 or more weeks?not consistently  Thyroid checked?no(related to hair loss, flu-like symptoms, wt gain/loss, fatigue, menopause)  Changed diet lately?no    OBSERVATION  Lumbar Posture: Positive  Pelvic symmetry: Negative  Introitus: unremarkable  Trendelenberg Sign:Negative    ROM  Single leg standing unilateral hip flexion PSIS:Negative  Standing forward flexion PSIS:Negative  Passive Hip ROM:Positive  YARI:Positive  YARI with OP:Positive  Posterior Sacral Shear:Negative    MUSCEL PERFORMANCE  Active SLR:Negative  Abdominal Core 90/90 hip lower:Negative  Baseline PF tone:normal  PF Tone with cough: hyper  Valsalva: not tested  PF Response quality: brisk - normal  Endurance: Maximum contraction in seconds: 3  # of endurance contractions before fatigue: na  Quick contraction repetitions prior to fatigue: na.  Specificity/accessory muscles: adductors, Synergy with abdominals: WNL/WFL.  Prolapse: Cyctocele/Rectocele/Uterine ndgndrndanddndend:nd nd2nd Urethrocele: none, Enterocele: none.    PALPATION: Non-tender all superficial structures with digital evaluation      Physical Therapy Initial Evaluation  Subjective:  The history is provided by the patient. No  was used.   Patient Health History  Marjorie Lee being seen for My chiropractor recommended I do pelvic floor PT.  Piriformis pain flares intermittent, persistent despite chiropractic care. Also have urinary urge/leak.       Problem occurred: Piriformis flares with speed runs or higher mileage demands.  Urinary urge/leak gradual onset   Pain is reported as 3/10 on pain scale.  General health as reported by patient is excellent.  Pertinent medical history includes: history of fractures. Other medical history details: Fibula stress fracture in 6/2021.  Metatarsal fracture in 2016.     Medical  allergies: none.   Surgeries include:  Other. Other surgery history details: Chlazion removal.    Current medications:  None. Other medications details: Multivitamin.    Current occupation is NP.   Primary job tasks include:  Computer work and repetitive tasks.                  Therapist Generated HPI Evaluation  Problem details: Is a runner and has hired a  since December.  Has right piriformis issue.  Has seen a chiropractor for this.  Worse with speed running and increasing the mileage.  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 Running [employer] in the fall.  Has done 4 marathons.  Running [employer] in June.  IA for marathon was 3:41.  Mild pain right TFL after running.  Running 4 days a week and swimming or cycling.   Has a 10 and 11 year old.  Working on strengthening at home has [employer] and [employer].  Has not had imaging. [employer] launch shoe for running. .         Type of problem:  Incontinence and pelvic dysfunction.                                                 Objective:  System         Lumbar/SI Evaluation  ROM:    AROM Lumbar:   Flexion:        Fingers to floor  Ext:                    60%   Side Bend:        Left:     Right:   Rotation:           Left:     Right:   Side Glide:        Left:     Right:           Lumbar Myotomes:  normal                                                    Pelvic Dysfunction Evaluation:    Bladder/Pelvic Problems:    Storage Problem:  Urge incontinence        Diagnostic Tests:  none                        Flexibility:    Tightness present at:Iliopsoas    Abdominal Wall:  normal        Pelvic Clock Exam:  normal            SI Provocation:  NA        Reflex Testing:  normal    External Assessment:  normal            Muscle Contraction/Perineal Mobility:  Elevation and urogential triangle descent  Internal Assessment:    Sensory Exam:  Normal  Contraction/Grade:  Good squeeze, good hold with lift, repeatable (4)          SEMG Biofeedback:    Equipment:  [id number]    Suraface electrode  placement--Perianal:  X  Baseline EMG PM:  Slightly elevated in hooklying      Sustained contraction:  Uses adductors  EMG interpretation to fatigue:  Less than3 seconds  Additional History:  Delivery History:  Tearing    Number of Live Births: 2            Hip Evaluation  Hip PROM:    Flexion: Left: no pain   Right: no pain        Internal Rotation: Left: 45    Right: 35  External Rotation: Left: 50    Right: 60              Hip Strength:    Flexion:   Left: 5/5   Pain:  Right: 5/5   Pain:                    Extension:  Left: 5/5  Pain:Right: 5/5    Pain:    Abduction:  Left: 5/5     Pain:Right: 5/5    Pain:                                 General     ROS    Assessment/Plan:    Patient is a 37 year old female with pelvic complaints.    Patient has the following significant findings with corresponding treatment plan.                Diagnosis 1:  Pelvic dysfunction  Pain -  manual therapy, self management, education and home program  Decreased ROM/flexibility - manual therapy, therapeutic exercise and home program  Decreased strength - therapeutic exercise, therapeutic activities and home program  Impaired muscle performance - biofeedback, neuro re-education and home program  Decreased function - therapeutic activities and home program    Therapy Evaluation Codes:   1) History comprised of:   Personal factors that impact the plan of care:      None.    Comorbidity factors that impact the plan of care are:      None.     Medications impacting care: None.  2) Examination of Body Systems comprised of:   Body structures and functions that impact the plan of care:      Pelvis.   Activity limitations that impact the plan of care are:      Sitting, Sports and Urge incontinence.  3) Clinical presentation characteristics are:   Stable/Uncomplicated.  4) Decision-Making    Low complexity using standardized patient assessment instrument and/or measureable assessment of functional outcome.  Cumulative Therapy Evaluation is: Low  complexity.    Previous and current functional limitations:  (See Goal Flow Sheet for this information)    Short term and Long term goals: (See Goal Flow Sheet for this information)     Communication ability:  Patient appears to be able to clearly communicate and understand verbal and written communication and follow directions correctly.  Treatment Explanation - The following has been discussed with the patient:   RX ordered/plan of care  Anticipated outcomes  Possible risks and side effects  This patient would benefit from PT intervention to resume normal activities.   Rehab potential is excellent.    Frequency:  2 X a month, once daily  Duration:  for 2 months  Discharge Plan:  Achieve all LTG.  Independent in home treatment program.  Reach maximal therapeutic benefit.    Please refer to the daily flowsheet for treatment today, total treatment time and time spent performing 1:1 timed codes.

## 2022-04-28 ENCOUNTER — THERAPY VISIT (OUTPATIENT)
Dept: PHYSICAL THERAPY | Facility: CLINIC | Age: 37
End: 2022-04-28
Payer: COMMERCIAL

## 2022-04-28 DIAGNOSIS — N39.41 URGE INCONTINENCE OF URINE: ICD-10-CM

## 2022-04-28 DIAGNOSIS — G57.01 PIRIFORMIS SYNDROME, RIGHT: Primary | ICD-10-CM

## 2022-04-28 DIAGNOSIS — M99.05 SOMATIC DYSFUNCTION OF PELVIS REGION: ICD-10-CM

## 2022-04-28 PROCEDURE — 97110 THERAPEUTIC EXERCISES: CPT | Mod: GP | Performed by: PHYSICAL THERAPIST

## 2022-04-28 PROCEDURE — 97530 THERAPEUTIC ACTIVITIES: CPT | Mod: GP | Performed by: PHYSICAL THERAPIST

## 2022-05-12 ENCOUNTER — THERAPY VISIT (OUTPATIENT)
Dept: PHYSICAL THERAPY | Facility: CLINIC | Age: 37
End: 2022-05-12
Payer: COMMERCIAL

## 2022-05-12 DIAGNOSIS — G57.01 PIRIFORMIS SYNDROME, RIGHT: Primary | ICD-10-CM

## 2022-05-12 DIAGNOSIS — M99.05 SOMATIC DYSFUNCTION OF PELVIS REGION: ICD-10-CM

## 2022-05-12 DIAGNOSIS — N39.41 URGE INCONTINENCE OF URINE: ICD-10-CM

## 2022-05-12 PROCEDURE — 97110 THERAPEUTIC EXERCISES: CPT | Mod: GP | Performed by: PHYSICAL THERAPIST

## 2022-05-19 ENCOUNTER — OFFICE VISIT (OUTPATIENT)
Dept: ORTHOPEDICS | Facility: CLINIC | Age: 37
End: 2022-05-19
Payer: COMMERCIAL

## 2022-05-19 DIAGNOSIS — M25.552 PAIN IN LEFT HIP: Primary | ICD-10-CM

## 2022-05-19 PROCEDURE — 99213 OFFICE O/P EST LOW 20 MIN: CPT | Performed by: FAMILY MEDICINE

## 2022-05-19 NOTE — LETTER
5/19/2022      RE: Marjorie Lee  [address]  [facility] [address]     Dear Colleague,    Thank you for referring your patient, Marjorie Lee, to the [facility]. Please see a copy of my visit note below.      [facility]  SPORTS & ORTHOPEDIC CLINIC VISIT     May 19, 2022        ASSESSMENT & PLAN    37-year-old runner with left posterior lateral hip pain that is suspected to be muscular in etiology based on patient report.  Overall this seems to have improved with some rest over the last few days.  It is also considered but seems unlikely at the current time.    Reviewed imaging and assessment with patient in detail  Recommended gradual return to activity  Consider follow-up with pain becomes recurrent.  Continue with regular home exercises and physical therapy for her pelvis and gluteal area.  Patient may be interested in piriformis injection from a prior injury.  We will research options for this for her.    Miguel Lange MD  [facility]    -----  Chief Complaint   Patient presents with     Right Hip - Pain       SUBJECTIVE  Marjorie Lee is a/an 37 year old female who is seen as a self referral for evaluation of  Left hip and pelvis pain.     The patient is seen by themselves.    Onset: 2 week(s) ago. Patient describes injury as being a runner and having pain the day after her run. Took 5 days off and pain became better.  Location of Pain: left lateral hip and posterior.  Seemed to radiate from the left lower lumbar area to the lateral hip.  Worsened by: Walking, standing, running, standing 1 legged   Better with: Rest, ice   Treatments tried: rest/activity avoidance and ice  Associated symptoms: no distal numbness or tingling; denies swelling or warmth    Orthopedic/Surgical history: NO  Social History/Occupation:  at [employer]       REVIEW OF SYSTEMS:    Do you have fever, chills, weight  loss? No    Do you have any vision problems? No    Do you have any chest pain or edema? No    Do you have any shortness of breath or wheezing?  No    Do you have stomach problems? No    Do you have any numbness or focal weakness? No    Do you have diabetes? No    Do you have problems with bleeding or clotting? No    Do you have an rashes or other skin lesions? No    OBJECTIVE:  There were no vitals taken for this visit.     Exam:  Patient is alert, in no acute distress, pleasant and conversational.    Gait: Nonantalgic.  Normal heel toe gait      left Hip:  Supine PROM:  Flexion: Approximately 115 , no tenderness.  External rotation: approximately 60 , no tenderness.  Internal rotation: Approximately 30 , no tenderness  No subjective pain reported with range of motion testing. ROM IS symmetric with uninjured side     Strength Testing:  Hip flexion: 5/5.  Hip adduction: 5/5.  Hip abduction: 5/5.  No subjective pain reported with strength testing.     Palpation:  negative tenderness to palpation over the greater trochanter.  negative tenderness to palpation over psoas  negative tenderness to palpation over ASIS  negative tenderness to palpation over Iliac crest  negative tenderness to palpation along the piriformis.  negative tenderness to palpation of the SI joint    Special Tests:  negative Sofie's test.   negative YARI's test  negative FADIR's test  negative Scour test  negative Reported pain with resisted hip flexion to opposite shoulder     Neurovascularly intact in bilateral lower extremities        RADIOLOGY:    No imaging this visit      Again, thank you for allowing me to participate in the care of your patient.      Sincerely,    Miguel Lange MD

## 2022-05-19 NOTE — PROGRESS NOTES
[facility]  SPORTS & ORTHOPEDIC CLINIC VISIT     May 19, 2022        ASSESSMENT & PLAN    37-year-old runner with left posterior lateral hip pain that is suspected to be muscular in etiology based on patient report.  Overall this seems to have improved with some rest over the last few days.  It is also considered but seems unlikely at the current time.    Reviewed imaging and assessment with patient in detail  Recommended gradual return to activity  Consider follow-up with pain becomes recurrent.  Continue with regular home exercises and physical therapy for her pelvis and gluteal area.  Patient may be interested in piriformis injection from a prior injury.  We will research options for this for her.    Miguel Lange MD  [facility] SPORTS MEDICINE CLINIC [place]    -----  Chief Complaint   Patient presents with     Right Hip - Pain       SUBJECTIVE  Marjorie Lee is a/an 37 year old female who is seen as a self referral for evaluation of  Left hip and pelvis pain.     The patient is seen by themselves.    Onset: 2 week(s) ago. Patient describes injury as being a runner and having pain the day after her run. Took 5 days off and pain became better.  Location of Pain: left lateral hip and posterior.  Seemed to radiate from the left lower lumbar area to the lateral hip.  Worsened by: Walking, standing, running, standing 1 legged   Better with: Rest, ice   Treatments tried: rest/activity avoidance and ice  Associated symptoms: no distal numbness or tingling; denies swelling or warmth    Orthopedic/Surgical history: NO  Social History/Occupation:  at [employer]       REVIEW OF SYSTEMS:    Do you have fever, chills, weight loss? No    Do you have any vision problems? No    Do you have any chest pain or edema? No    Do you have any shortness of breath or wheezing?  No    Do you have stomach problems? No    Do you have any numbness or focal weakness? No    Do you have diabetes?  No    Do you have problems with bleeding or clotting? No    Do you have an rashes or other skin lesions? No    OBJECTIVE:  There were no vitals taken for this visit.     Exam:  Patient is alert, in no acute distress, pleasant and conversational.    Gait: Nonantalgic.  Normal heel toe gait      left Hip:  Supine PROM:  Flexion: Approximately 115 , no tenderness.  External rotation: approximately 60 , no tenderness.  Internal rotation: Approximately 30 , no tenderness  No subjective pain reported with range of motion testing. ROM IS symmetric with uninjured side     Strength Testing:  Hip flexion: 5/5.  Hip adduction: 5/5.  Hip abduction: 5/5.  No subjective pain reported with strength testing.     Palpation:  negative tenderness to palpation over the greater trochanter.  negative tenderness to palpation over psoas  negative tenderness to palpation over ASIS  negative tenderness to palpation over Iliac crest  negative tenderness to palpation along the piriformis.  negative tenderness to palpation of the SI joint    Special Tests:  negative Sofie's test.   negative YARI's test  negative FADIR's test  negative Scour test  negative Reported pain with resisted hip flexion to opposite shoulder     Neurovascularly intact in bilateral lower extremities        RADIOLOGY:    No imaging this visit

## 2022-06-15 NOTE — PROGRESS NOTES
Subjective:  HPI  Physical Exam                    Objective:  System    Physical Exam    General     ROS    Assessment/Plan:    DISCHARGE REPORT    Progress reporting period is from 4/7/2022 to 5/12/2022.       SUBJECTIVE  Subjective changes noted by patient:  .  Subjective: Had left gluteal pain on Monday after 12 miles run Sat and a bike ride Sunday.  No pain while she was active.  Got new shoes.  Ran 45 minutes in these.  No pain after this.  Pain in the lower back area on the left.  Better now.  Will see MD and ask for x rays.  Had some incontinence with jumping at [employer]    Current pain level is NA  .     Previous pain level was  NA Initial Pain level: 3/10.   Changes in function:  Yes (See Goal flowsheet attached for changes in current functional level)  Adverse reaction to treatment or activity: None    OBJECTIVE  Changes noted in objective findings:  Yes,   Objective: Left gluteal pain with single leg hop today.  No pain with trunk AROM but limited side bend and extension.  No pain with palpation.  Will plan to be in touch with patient after MD visit.     ASSESSMENT/PLAN  Updated problem list and treatment plan: Diagnosis 1:  Right gluteal pain  Pain -  manual therapy, self management, education, directional preference exercise and home program  Decreased ROM/flexibility - manual therapy, therapeutic exercise and home program  Decreased strength - therapeutic exercise, therapeutic activities and home program  Impaired muscle performance - biofeedback, neuro re-education and home program  Decreased function - therapeutic activities and home program  STG/LTGs have been met or progress has been made towards goals:  Yes (See Goal flow sheet completed today.)  Assessment of Progress: The patient's condition has potential to improve.  The patient has not returned to therapy. Current status is unknown.  Self Management Plans:  Patient has been instructed in a home treatment program.    Marjorie continues to  require the following intervention to meet STG and LTG's:  Patient needs to continue to work on the home exercise program.      Recommendations:  Will discharge since patient has not returned.     Please refer to the daily flowsheet for treatment today, total treatment time and time spent performing 1:1 timed codes.

## 2022-06-16 ENCOUNTER — VIRTUAL VISIT (OUTPATIENT)
Dept: GASTROENTEROLOGY | Facility: CLINIC | Age: 37
End: 2022-06-16
Payer: COMMERCIAL

## 2022-06-16 DIAGNOSIS — Z87.19 HISTORY OF COLITIS: ICD-10-CM

## 2022-06-16 DIAGNOSIS — R93.3 ABNORMAL COLONOSCOPY: Primary | ICD-10-CM

## 2022-06-16 PROCEDURE — 99214 OFFICE O/P EST MOD 30 MIN: CPT | Mod: 95 | Performed by: PHYSICIAN ASSISTANT

## 2022-06-16 NOTE — PROGRESS NOTES
Marjorie is a 37 year old who is being evaluated via a billable video visit.      How would you like to obtain your AVS? MyChart  If the video visit is dropped, the invitation should be resent by: Text to cell phone: [phone]  Will anyone else be joining your video visit? No          GASTROENTEROLOGY FOLLOW UP VIDEO VISIT     Video Start Time: 10:54 AM    CC/REFERRING MD:    La Scott    REASON FOR VISIT:  Follow Up      HISTORY OF PRESENT ILLNESS:    Marjorie Lee is 37 year old female who presents for follow up.     She initially presented in July 2019 for concerns with bloody diarrhea and which was thought to be related to ischemic colitis. Please see previous notes for more detail. Briefly, she was noting multiple episodes of bloody diarrhea in April and May of 2019. At that time she was aggressively training for a marathon and was only noting symptoms after aggressive runs. We further evaluated with a colonoscopy in September 2019 which revealed multiple ulcers in rectum, sigmoid, descending and ascending colon. The scope was unable to pass hepatic flexure at that time due to significant looping and patient discomfort. Biopsies were consistent with acute inflammation likely from nsaid injury or infection. She had previously taken NSAID on one occ and discontinued. We further evaluated with stool studies at that time which were negative for infection. Her symptoms and clinical picture were more concerning for ischemic colitis. CT abd/pelvis at that time did show patent abdominopelvic arteries. She underwent a repeat colonoscopy in March 2020 which continued to show multiple ulcers in the entire examined colon as well as multiple ulcers in the terminal ileum. Biopsies were consistent with acute inflammation, no signs of chronicity. At a follow up in September 2020 she was noting flare ups of diarrhea. We further evaluated with stool studies and an MRE in October 2020. Her infectious stool  studies were negative. Her fecal calpro level was borderline. Her MRE did not show any signs of small or large bowel inflammation.      At our last follow up in March 2021 she was feeling well and noting soft but regular BM's daily without abdominal pain. She did report one episode of bloody stools after an aggressive run that she was doing while training for a marathon.     Today she returns for follow up. Reports that she is overall doing well. On rare occ feels sensation on left lower side which she cant see to correlate to diet or BM's. Her BM's are fairly regular denies constipation or diarrhea. She has noticed brbpr with wiping on a few occ but does have hx of hemorrhoids. She has continued training and hasn't had any further occurrences of bloody diarrhea.     Marjorie  has a past medical history of NO ACTIVE PROBLEMS.    She  has a past surgical history that includes Colonoscopy with CO2 insufflation (N/A, 9/3/2019); Colonoscopy (N/A, 9/3/2019); Colonoscopy with CO2 insufflation (N/A, 3/5/2020); and Colonoscopy (N/A, 3/5/2020).    She  reports that she has never smoked. She has never used smokeless tobacco. She reports current alcohol use. She reports that she does not use drugs.    Her family history includes Breast Cancer in her maternal aunt; Colon Cancer in her maternal grandmother; Osteoarthritis in her father and mother; Other - See Comments in her paternal uncle and paternal uncle.    ALLERGIES:  No known allergies      PREVIOUS ENDOSCOPY:  Colonoscopy 3/5/2020  Impression:       - Hemorrhoids found on perianal exam.                             - Multiple ulcers in the entire examined colon. Biopsied. Similar to previous exam.                             - Erythematous mucosa in the ascending colon and in the cecum. Biopsied.                             - Multiple ulcers in the terminal ileum. Biopsied.      TO ORIGINAL REPORT   Status: Signed Out   Date Ordered:3/10/2020   Date Reported:3/10/2020 13:12    Signed Out By: Solange Montejo M.D., [facility], [facility]     INTERPRETATION:   ZN and PAS stains have been performed on blocks A1, B1, and C1 on account   of ileal granuloma and other foci of   loose histiocytes.  These stains are negative respectively for acid-fast   bacteria and fungi. Initial diagnosis   and comment are unchanged.     ORIGINAL REPORT:     SPECIMEN(S):   A: Ileum biopsy   B: Colon biopsy, ascending   C: Colon biopsy, descending   D: Rectal biopsy     FINAL DIAGNOSIS:   A. Ileum, Biopsy:   Ileal mucosa with focal active inflammation loose cluster of histiocytes   without true granuloma (See Comment)     B. Ascending Colon, Biopsy:   Colonic mucosa with focal neutrophilic cryptitis and granuloma formation;   no crypt architectural distortion, metaplasias, dysplasia, or other features of chronic colitis apparent (See    Comment)     C. Descending Colon, Biopsy:   Colonic mucosa with focal neutrophilic cryptitis and loose clusters of histiocytes without true granuloma; no   crypt architectural distortion, metaplasias, dysplasia, or other features of chronic colitis apparent (See   Comment)     D. Rectal, Biopsy:   Rectal mucosa with no significant histologic abnormality     COMMENT:   Parts A to C show evidence of patchy acute inflammation, and a small granuloma in the ascending colon, but without the chronic features one expects with Crohn or ulcerative colitis.    The possibility of an infective or other reactive, (e.g. to drugs) rather than IBD etiology for diarrhea   therefore persists, as discussed at the time of earlier biopsy. Clinical correlation is required. Additional   stains (GMS, ZN/AFB) have been ordered in view of the isolated granuloma and loose histiocytes; report of findings will follow.     I have personally reviewed all specimens and/or slides, including the listed special stains, and used them   with my medical judgement to determine or confirm the final diagnosis.      Electronically signed out by:     Solange Montejo M.D., [facility], [facility]         Colonoscopy 9/3/2019  Impression:       - Multiple ulcers in the rectum, in the sigmoid colon, in the descending colon and in the ascending colon. Biopsied.                             - Tortuous colon with signifcant looping and patient discomfort. Extent reached was hepatic flexure despite increasing sedation, position changes and manual pressure.      SPECIMEN(S):   A: Colon biopsy, ascending   B: Colon biopsy, descending   C: Rectal biopsy     FINAL DIAGNOSIS:   A. COLON BIOPSY, ASCENDING:   - Colonic mucosa with focal active inflammation   - Negative for significant crypt glandular distortion, pseudo-pyloric gland metaplasia, granuloma formation and dysplasia     B. COLON BIOPSY, DESCENDING:   - Colonic mucosa with focal active inflammation   - Negative for significant crypt glandular distortion, pseudo-pyloric gland or Paneth metaplasia, granuloma formation and dysplasia     C. RECTAL BIOPSY:   - Colonic mucosa with focal active inflammation   - Negative for significant crypt glandular distortion, pseudo-pyloric gland or Paneth metaplasia, granuloma formation and dysplasia     COMMENT:     The acute inflammation is patchy and differential includes drug effect (such as NSAIDs) versus infection.   Crohn's disease is less likely as there is no evidence of chronicity (no pseudo-pyloric gland metaplasia or   Paneth cell metaplasia on the left side).  Dr. ANDRE Garcia has reviewed parts A, B and concurs.     I have personally reviewed all specimens and/or slides, including the listed special stains, and used them   with my medical judgement to determine or confirm the final diagnosis.     Electronically signed out by:     Trino Henry M.D., [facility]     PERTINENT MEDICATIONS:    Current Outpatient Medications:      levonorgestrel (MIRENA) 20 MCG/24HR IUD, 1 each (20 mcg) by Intrauterine route continuous, Disp: , Rfl:       Multiple Vitamins-Minerals (MULTIVITAMIN PO), Take 1 tablet by mouth daily , Disp: , Rfl:      triamcinolone (KENALOG) 0.5 % external ointment, Apply in thin layer to affected areas twice a day.  Use for no longer than two weeks at a time., Disp: 80 g, Rfl: 1    Current Facility-Administered Medications:      glucagon injection 1 mg, 1 mg, Intravenous, Once, Cassy Young PA-C    Facility-Administered Medications Ordered in Other Visits:      gadobutrol (GADAVIST) injection 7.5 mL, 7.5 mL, Intravenous, Once, Cassy Young PA-C      PHYSICAL EXAMINATION:  Constitutional: aaox3, cooperative, pleasant, not dyspneic/diaphoretic, no acute distress      GENERAL: Healthy, alert and no distress  EYES: Eyes grossly normal to inspection.  No discharge or erythema, or obvious scleral/conjunctival abnormalities.  RESP: No audible wheeze, cough, or visible cyanosis.  No visible retractions or increased work of breathing.    SKIN: Visible skin clear. No significant rash, abnormal pigmentation or lesions.  NEURO: Cranial nerves grossly intact.  Mentation and speech appropriate for age.  PSYCH: Mentation appears normal, affect normal/bright, judgement and insight intact, normal speech and appearance well-groomed.          PERTINENT STUDIES: (I personally reviewed these laboratory studies today)  Most recent CBC:   Recent Labs   Lab Test 09/30/21  0859 07/01/19  0818   WBC 4.6 6.3   HGB 13.3 13.8   HCT 41.1 41.9    200     Most recent hepatic panel:  Recent Labs   Lab Test 09/30/21  0859 09/17/19  1041   ALT 25 28   AST 14 21     Most recent creatinine:  Recent Labs   Lab Test 09/30/21  0859 07/01/19  0818   CR 0.69 0.72       TSH   Date Value Ref Range Status   09/30/2021 3.23 0.40 - 4.00 mU/L Final   09/17/2019 3.72 0.40 - 4.00 mU/L Final         RADIOLOGY:       EXAMINATION: MR ENTEROGRAPHY WO AND W CONTRAST, 10/22/2020 3:57 PM     TECHNIQUE:  Multiplanar, multisequence MRI imaging of the abdomen and  pelvis was obtained  using MR enterography protocol without and with  intravenous gadolinium. Contrast dose: Gadavist 5.5 mL     COMPARISON: CT 9/10/2019     HISTORY: Crohn's suspected, non-acute, abd pain, initial exam; ulcers  found on colonoscopy x 2, initially thought to be related to ischemic  colitis, r/o crohn's; Abnormal colonoscopy; Diarrhea, unspecified type     FINDINGS:     Exam quality: Adequate     Small and large bowel: Moderate volume stool throughout the colon. No  small or large bowel dilatation. No focal wall thickening, stricture,  or abnormal enhancement. No fistula or abscess.     Remainder of exam: The liver, spleen, adrenal glands, kidneys, and  pancreas are normal. Gallbladder is unremarkable. No bulky  lymphadenopathy. Lung bases are clear. No suspicious bony abnormality.  No abnormal T2 hyperintensity in the lung bases. Intrauterine device  is in good position. Trace pelvic free fluid.                                                                      IMPRESSION:  1. Normal MR enterography.   2. Moderate colonic stool burden.     I have personally reviewed the examination and initial interpretation  and I agree with the findings.     PAULINE CAZARES MD    ASSESSMENT/PLAN:    1. Abnormal colonoscopy  2. History of colitis      Marjorie Lee is a 37 year old female who presents for follow up.     I have been following her since mid-2019 alec she developed bloody diarrhea after training for a marathon. Her symptoms at that time were concerning for ischemic colitis and she was evaluated with a colonoscopy which noted multiple ulcers. A CT abd/pelvis at that time did show patent abdominopelvic arteries. A repeat colonoscopy in March of 2020 however continued to show ulcers throughout the colon. It was unclear as to why she continued to have ulcers in her colon and TI on repeat colonoscopy. Her more recent work up notes a borderline fecal calpro but a normal MRE in October 2020.     She has continued to  train/eric and thankfully has not had any further occurrences of bloody diarrhea since our last visit in March 2021. She has noted very occ brbpr with wiping which is consistent with known hemorrhoids documented on prior colonoscopy. She is overall doing well at this time without concerning symptoms. Advised to report any symptoms of concern such as blood stools, abdominal pain or diarrhea. If symptoms do return will check labs and fecal calpro and consider colonoscopy.       Return if symptoms worsen or fail to improve.      Thank you for this consultation.  It was a pleasure to participate in the care of this patient; please contact us with any further questions.        This note was created with voice recognition software, and while reviewed for accuracy, typos may remain.       30 minutes spent on the date of the encounter doing chart review, history and exam, documentation and further activities per the note    Cassy Young PA-C  Gastroenterology  [facility]       Video-Visit Details    Type of service:  Video Visit    Video End Time:11:09 AM    Originating Location (pt. Location): Home    Distant Location (provider location):  [facility]     Platform used for Video Visit: Tanya

## 2022-06-16 NOTE — PATIENT INSTRUCTIONS
It was a pleasure visiting with you today.     I am glad to hear that you are overall doing well. Please let us know if any symptoms return such as abdominal pain, diarrhea or blood stools.     Thank you,     Cassy Young PA-C  Gastroenterology  [facility]

## 2022-09-18 ENCOUNTER — HEALTH MAINTENANCE LETTER (OUTPATIENT)
Age: 37
End: 2022-09-18

## 2022-11-06 ASSESSMENT — ENCOUNTER SYMPTOMS
PALPITATIONS: 0
HEMATOCHEZIA: 0
WEAKNESS: 0
NAUSEA: 0
DIZZINESS: 0
DYSURIA: 0
BREAST MASS: 0
CHILLS: 0
JOINT SWELLING: 0
ABDOMINAL PAIN: 0
ARTHRALGIAS: 1
SHORTNESS OF BREATH: 0
HEADACHES: 0
MYALGIAS: 0
DIARRHEA: 0
EYE PAIN: 0
FEVER: 0
FREQUENCY: 0
SORE THROAT: 0
PARESTHESIAS: 0
NERVOUS/ANXIOUS: 1
CONSTIPATION: 0
COUGH: 0
HEMATURIA: 0
HEARTBURN: 0

## 2022-11-07 ENCOUNTER — OFFICE VISIT (OUTPATIENT)
Dept: FAMILY MEDICINE | Facility: CLINIC | Age: 37
End: 2022-11-07
Payer: COMMERCIAL

## 2022-11-07 VITALS
HEART RATE: 67 BPM | HEIGHT: 64 IN | DIASTOLIC BLOOD PRESSURE: 81 MMHG | BODY MASS INDEX: 20.93 KG/M2 | SYSTOLIC BLOOD PRESSURE: 138 MMHG | WEIGHT: 122.6 LBS | RESPIRATION RATE: 18 BRPM | TEMPERATURE: 97 F | OXYGEN SATURATION: 98 %

## 2022-11-07 DIAGNOSIS — Z23 HIGH PRIORITY FOR 2019-NCOV VACCINE: ICD-10-CM

## 2022-11-07 DIAGNOSIS — K55.9 ISCHEMIC COLITIS (H): ICD-10-CM

## 2022-11-07 DIAGNOSIS — Z13.6 CARDIOVASCULAR SCREENING; LDL GOAL LESS THAN 160: ICD-10-CM

## 2022-11-07 DIAGNOSIS — Z23 FLU VACCINE NEED: ICD-10-CM

## 2022-11-07 DIAGNOSIS — Z13.29 SCREENING FOR THYROID DISORDER: ICD-10-CM

## 2022-11-07 DIAGNOSIS — Z00.00 ROUTINE GENERAL MEDICAL EXAMINATION AT A HEALTH CARE FACILITY: Primary | ICD-10-CM

## 2022-11-07 DIAGNOSIS — Z12.4 CERVICAL CANCER SCREENING: ICD-10-CM

## 2022-11-07 DIAGNOSIS — Z13.1 ENCOUNTER FOR SCREENING EXAMINATION FOR IMPAIRED GLUCOSE REGULATION AND DIABETES MELLITUS: ICD-10-CM

## 2022-11-07 LAB
CHOLEST SERPL-MCNC: 156 MG/DL
FASTING STATUS PATIENT QL REPORTED: YES
FASTING STATUS PATIENT QL REPORTED: YES
GLUCOSE BLD-MCNC: 94 MG/DL (ref 70–99)
HDLC SERPL-MCNC: 71 MG/DL
LDLC SERPL CALC-MCNC: 76 MG/DL
NONHDLC SERPL-MCNC: 85 MG/DL
T4 FREE SERPL-MCNC: 0.85 NG/DL (ref 0.76–1.46)
TRIGL SERPL-MCNC: 46 MG/DL
TSH SERPL DL<=0.005 MIU/L-ACNC: 4.32 MU/L (ref 0.4–4)

## 2022-11-07 PROCEDURE — G0145 SCR C/V CYTO,THINLAYER,RESCR: HCPCS | Performed by: NURSE PRACTITIONER

## 2022-11-07 PROCEDURE — 82947 ASSAY GLUCOSE BLOOD QUANT: CPT | Performed by: NURSE PRACTITIONER

## 2022-11-07 PROCEDURE — 91312 COVID-19,PF,PFIZER BOOSTER BIVALENT: CPT | Performed by: NURSE PRACTITIONER

## 2022-11-07 PROCEDURE — 99395 PREV VISIT EST AGE 18-39: CPT | Mod: 25 | Performed by: NURSE PRACTITIONER

## 2022-11-07 PROCEDURE — 87624 HPV HI-RISK TYP POOLED RSLT: CPT | Performed by: NURSE PRACTITIONER

## 2022-11-07 PROCEDURE — 84439 ASSAY OF FREE THYROXINE: CPT | Performed by: NURSE PRACTITIONER

## 2022-11-07 PROCEDURE — 84443 ASSAY THYROID STIM HORMONE: CPT | Performed by: NURSE PRACTITIONER

## 2022-11-07 PROCEDURE — 36415 COLL VENOUS BLD VENIPUNCTURE: CPT | Performed by: NURSE PRACTITIONER

## 2022-11-07 PROCEDURE — 0124A COVID-19,PF,PFIZER BOOSTER BIVALENT: CPT | Performed by: NURSE PRACTITIONER

## 2022-11-07 PROCEDURE — 80061 LIPID PANEL: CPT | Performed by: NURSE PRACTITIONER

## 2022-11-07 PROCEDURE — 90471 IMMUNIZATION ADMIN: CPT | Performed by: NURSE PRACTITIONER

## 2022-11-07 PROCEDURE — 90686 IIV4 VACC NO PRSV 0.5 ML IM: CPT | Performed by: NURSE PRACTITIONER

## 2022-11-07 ASSESSMENT — ENCOUNTER SYMPTOMS
PALPITATIONS: 0
BREAST MASS: 0
DIZZINESS: 0
COUGH: 0
HEMATURIA: 0
SHORTNESS OF BREATH: 0
ABDOMINAL PAIN: 0
HEADACHES: 0
WEAKNESS: 0
DIARRHEA: 0
CONSTIPATION: 0
HEMATOCHEZIA: 0
PARESTHESIAS: 0
NERVOUS/ANXIOUS: 1
MYALGIAS: 0
EYE PAIN: 0
CHILLS: 0
HEARTBURN: 0
FREQUENCY: 0
SORE THROAT: 0
JOINT SWELLING: 0
DYSURIA: 0
FEVER: 0
NAUSEA: 0
ARTHRALGIAS: 1

## 2022-11-07 ASSESSMENT — PAIN SCALES - GENERAL: PAINLEVEL: NO PAIN (0)

## 2022-11-07 NOTE — PROGRESS NOTES
SUBJECTIVE:   CC: Marjorie is an 37 year old who presents for preventive health visit.       Patient has been advised of split billing requirements and indicates understanding: Yes  Healthy Habits:     Getting at least 3 servings of Calcium per day:  Yes    Bi-annual eye exam:  NO    Dental care twice a year:  Yes    Sleep apnea or symptoms of sleep apnea:  None    Diet:  Regular (no restrictions)    Frequency of exercise:  6-7 days/week    Duration of exercise:  45-60 minutes    Taking medications regularly:  Yes    Medication side effects:  Not applicable    PHQ-2 Total Score: 1    Additional concerns today:  Yes        Dx with ischemic colitis from training/running -now resolved.  No concern for STI. Declines HIV testing.    Today's PHQ-2 Score:   PHQ-2 ( 1999 Pfizer) 11/6/2022   Q1: Little interest or pleasure in doing things 1   Q2: Feeling down, depressed or hopeless 0   PHQ-2 Score 1   PHQ-2 Total Score (12-17 Years)- Positive if 3 or more points; Administer PHQ-A if positive -   Q1: Little interest or pleasure in doing things Several days   Q2: Feeling down, depressed or hopeless Not at all   PHQ-2 Score 1       Abuse: Current or Past (Physical, Sexual or Emotional) - No  Do you feel safe in your environment? Yes        Social History     Tobacco Use     Smoking status: Never     Smokeless tobacco: Never   Substance Use Topics     Alcohol use: Yes     Comment: occ     If you drink alcohol do you typically have >3 drinks per day or >7 drinks per week? No    Alcohol Use 11/6/2022   Prescreen: >3 drinks/day or >7 drinks/week? No   Prescreen: >3 drinks/day or >7 drinks/week? -   AUDIT SCORE  -       Reviewed orders with patient.  Reviewed health maintenance and updated orders accordingly - Yes  Lab work is in process  Labs reviewed in EPIC  BP Readings from Last 3 Encounters:   11/07/22 138/81   10/06/21 119/72   12/31/20 104/66    Wt Readings from Last 3 Encounters:   11/07/22 55.6 kg (122 lb 9.6 oz)   10/06/21  52.6 kg (116 lb)   06/26/21 54.4 kg (120 lb)                  Patient Active Problem List   Diagnosis     IUD (intrauterine device) in place     Abnormal colonoscopy     Rectal bleeding     Piriformis syndrome, right     Urge incontinence of urine     Somatic dysfunction of pelvis region     Ischemic colitis (H)     Past Surgical History:   Procedure Laterality Date     COLONOSCOPY N/A 09/03/2019    Procedure: Colonoscopy, With Polypectomy And Biopsy;  Surgeon: Radha Neri DO;  Location: [facility]     COLONOSCOPY N/A 03/05/2020    Procedure: Colonoscopy, With Polypectomy And Biopsy;  Surgeon: Radha Neri DO;  Location[facility]     COLONOSCOPY WITH CO2 INSUFFLATION N/A 09/03/2019    Procedure: COLONOSCOPY, WITH CO2 INSUFFLATION;  Surgeon: Radha Neri DO;  [facility]     COLONOSCOPY WITH CO2 INSUFFLATION N/A 03/05/2020    Procedure: COLONOSCOPY, WITH CO2 INSUFFLATION;  Surgeon: Radha Neri DO;  Location: [facility]     EYE SURGERY      chalazion removal       Social History     Tobacco Use     Smoking status: Never     Smokeless tobacco: Never   Substance Use Topics     Alcohol use: Yes     Comment: occ     Family History   Problem Relation Age of Onset     Osteoarthritis Mother      Hypertension Mother      Hyperlipidemia Mother      Osteoarthritis Father      Hypertension Father      Breast Cancer Maternal Aunt      Other - See Comments Paternal Uncle         glioblastoma     Other - See Comments Paternal Uncle         glioblastoma     Colon Cancer Maternal Grandmother      Diabetes Maternal Grandmother      Obesity Maternal Grandmother      Colon Cancer Paternal Grandmother      Diabetes Other         maternal uncle     Coronary Artery Disease Other         maternal uncle     Obesity Other      Diabetes Other         maternal aunt     Breast Cancer Other      Substance Abuse Brother         alcohol abuse     Asthma Niece      Asthma Sister          Current Outpatient Medications   Medication Sig  Dispense Refill     levonorgestrel (MIRENA) 20 MCG/24HR IUD 1 each (20 mcg) by Intrauterine route continuous       Multiple Vitamins-Minerals (MULTIVITAMIN PO) Take 1 tablet by mouth daily        triamcinolone (KENALOG) 0.5 % external ointment Apply in thin layer to affected areas twice a day.  Use for no longer than two weeks at a time. 80 g 1     Allergies   Allergen Reactions     No Known Allergies      Recent Labs   Lab Test 09/30/21  0859 09/17/19  1041 07/01/19  0818 11/01/17  0000   LDL 57 66  --  77   HDL 63 74  --  63   TRIG 41 33  --  50   ALT 25 28 39  --    CR 0.69  --  0.72  --    GFRESTIMATED >90  --  >90  --    GFRESTBLACK  --   --  >90  --    POTASSIUM 4.1  --  4.0  --    TSH 3.23 3.72 5.32* 4.61*        Breast Cancer Screening:    FHS-7:   Breast CA Risk Assessment (FHS-7) 10/4/2021 11/6/2022   Did any of your first-degree relatives have breast or ovarian cancer? No No   Did any of your relatives have bilateral breast cancer? No No   Did any man in your family have breast cancer? No No   Did any woman in your family have breast and ovarian cancer? No No   Did any woman in your family have breast cancer before age 50 y? Yes No   Do you have 2 or more relatives with breast and/or ovarian cancer? No Yes   Do you have 2 or more relatives with breast and/or bowel cancer? Yes Yes       Patient under 40 years of age: Routine Mammogram Screening not recommended.   Pertinent mammograms are reviewed under the imaging tab.    History of abnormal Pap smear: NO - age 30-65 PAP every 5 years with negative HPV co-testing recommended     Reviewed and updated as needed this visit by clinical staff   Tobacco  Allergies  Meds  Problems  Med Hx  Surg Hx  Fam Hx  Soc   Hx        Reviewed and updated as needed this visit by Provider   Tobacco  Allergies  Meds  Problems  Med Hx  Surg Hx  Fam Hx         Past Medical History:   Diagnosis Date     NO ACTIVE PROBLEMS       Past Surgical History:   Procedure  Laterality Date     COLONOSCOPY N/A 2019    Procedure: Colonoscopy, With Polypectomy And Biopsy;  Surgeon: Radha Neri DO;  [facility]     COLONOSCOPY N/A 2020    Procedure: Colonoscopy, With Polypectomy And Biopsy;  Surgeon: Radha Neri DO;  [facility]     COLONOSCOPY WITH CO2 INSUFFLATION N/A 2019    Procedure: COLONOSCOPY, WITH CO2 INSUFFLATION;  Surgeon: Radha Neri DO;  [facility]     COLONOSCOPY WITH CO2 INSUFFLATION N/A 2020    Procedure: COLONOSCOPY, WITH CO2 INSUFFLATION;  Surgeon: Radha Neri DO;  [facility]     EYE SURGERY      chalazion removal     OB History    Para Term  AB Living   2 0 0 0 0 2   SAB IAB Ectopic Multiple Live Births   0 0 0 0 2      # Outcome Date GA Lbr Hardy/2nd Weight Sex Delivery Anes PTL Lv   2      F    MALATHI   1          MALATHI       Review of Systems   Constitutional: Negative for chills and fever.   HENT: Negative for congestion, ear pain, hearing loss and sore throat.    Eyes: Negative for pain and visual disturbance.   Respiratory: Negative for cough and shortness of breath.    Cardiovascular: Negative for chest pain, palpitations and peripheral edema.   Gastrointestinal: Negative for abdominal pain, constipation, diarrhea, heartburn, hematochezia and nausea.   Breasts:  Negative for tenderness, breast mass and discharge.   Genitourinary: Negative for dysuria, frequency, genital sores, hematuria, pelvic pain, urgency, vaginal bleeding and vaginal discharge.   Musculoskeletal: Positive for arthralgias. Negative for joint swelling and myalgias.   Skin: Negative for rash.   Neurological: Negative for dizziness, weakness, headaches and paresthesias.   Psychiatric/Behavioral: Negative for mood changes. The patient is nervous/anxious.           OBJECTIVE:   /81 (BP Location: Left arm, Patient Position: Sitting, Cuff Size: Adult Regular)   Pulse 67   Temp 97  F (36.1  C)  "(Tympanic)   Resp 18   Ht 1.619 m (5' 3.75\")   Wt 55.6 kg (122 lb 9.6 oz)   SpO2 98%   BMI 21.21 kg/m    Physical Exam  GENERAL: healthy, alert and no distress  EYES: Eyes grossly normal to inspection, PERRL and conjunctivae and sclerae normal  HENT: ear canals and TM's normal, nose and mouth without ulcers or lesions  NECK: no adenopathy, no asymmetry, masses, or scars and thyroid normal to palpation  RESP: lungs clear to auscultation - no rales, rhonchi or wheezes  BREAST: normal without masses, tenderness or nipple discharge and no palpable axillary masses or adenopathy  CV: regular rate and rhythm, normal S1 S2, no S3 or S4, no murmur, click or rub, no peripheral edema and peripheral pulses strong  ABDOMEN: soft, nontender, no hepatosplenomegaly, no masses and bowel sounds normal   (female): normal female external genitalia, normal urethral meatus, vaginal mucosa pink, moist, well rugated, and normal cervix/adnexa/uterus without masses or discharge. IUD strings in place  MS: no gross musculoskeletal defects noted, no edema  SKIN: no suspicious lesions or rashes  NEURO: Normal strength and tone, mentation intact and speech normal  PSYCH: mentation appears normal, affect normal/bright    Diagnostic Test Results:  Labs reviewed in Epic  No results found for this or any previous visit (from the past 24 hour(s)).    ASSESSMENT/PLAN:   (Z00.00) Routine general medical examination at a health care facility  (primary encounter diagnosis)  Plan: REVIEW OF HEALTH MAINTENANCE PROTOCOL ORDERS            (Z12.4) Cervical cancer screening  Plan: Pap Screen with HPV - recommended age 30 - 65         years            (Z23) High priority for 2019-nCoV vaccine  Plan: COVID-19,PF,PFIZER BOOSTER BIVALENT 12+Yrs            (K55.9) Ischemic colitis (H)  Comment: From running/training. No longer symptomatic and has now been cleared by GI.    (Z23) Flu vaccine need  Plan: INFLUENZA VACCINE IM > 6 MONTHS VALENT IIV4         " "(AFLURIA/FLUZONE)            (Z13.6) CARDIOVASCULAR SCREENING; LDL GOAL LESS THAN 160  Comment: fasting  Plan: Lipid panel reflex to direct LDL Fasting            (Z13.1) Encounter for screening examination for impaired glucose regulation and diabetes mellitus  Comment: fasting  Plan: Glucose            (Z13.29) Screening for thyroid disorder  Plan: TSH with free T4 reflex                    COUNSELING:  Reviewed preventive health counseling, as reflected in patient instructions       Regular exercise       Healthy diet/nutrition    Estimated body mass index is 21.21 kg/m  as calculated from the following:    Height as of this encounter: 1.619 m (5' 3.75\").    Weight as of this encounter: 55.6 kg (122 lb 9.6 oz).        She reports that she has never smoked. She has never used smokeless tobacco.      Counseling Resources:  ATP IV Guidelines  Pooled Cohorts Equation Calculator  Breast Cancer Risk Calculator  BRCA-Related Cancer Risk Assessment: FHS-7 Tool  FRAX Risk Assessment  ICSI Preventive Guidelines  Dietary Guidelines for Americans, 2010  USDA's MyPlate  ASA Prophylaxis  Lung CA Screening    EUSEBIA PETERSON, MARCY CNP  [facility]  "

## 2022-11-09 LAB
BKR LAB AP GYN ADEQUACY: NORMAL
BKR LAB AP GYN INTERPRETATION: NORMAL
BKR LAB AP HPV REFLEX: NORMAL
BKR LAB AP PREVIOUS ABNORMAL: NORMAL
PATH REPORT.COMMENTS IMP SPEC: NORMAL
PATH REPORT.COMMENTS IMP SPEC: NORMAL
PATH REPORT.RELEVANT HX SPEC: NORMAL

## 2022-11-11 LAB
HUMAN PAPILLOMA VIRUS 16 DNA: NEGATIVE
HUMAN PAPILLOMA VIRUS 18 DNA: NEGATIVE
HUMAN PAPILLOMA VIRUS FINAL DIAGNOSIS: NORMAL
HUMAN PAPILLOMA VIRUS OTHER HR: NEGATIVE

## 2022-11-26 ENCOUNTER — MYC MEDICAL ADVICE (OUTPATIENT)
Dept: FAMILY MEDICINE | Facility: CLINIC | Age: 37
End: 2022-11-26

## 2022-11-26 DIAGNOSIS — Z12.31 ENCOUNTER FOR SCREENING MAMMOGRAM FOR BREAST CANCER: Primary | ICD-10-CM

## 2022-11-28 NOTE — TELEPHONE ENCOUNTER
Routing to provider.    Pt had mammogram on 2/27/2020 and was advised to do annual mammogram.      Barbie Méndez RN  [facility]

## 2022-12-02 ENCOUNTER — OFFICE VISIT (OUTPATIENT)
Dept: FAMILY MEDICINE | Facility: CLINIC | Age: 37
End: 2022-12-02
Payer: COMMERCIAL

## 2022-12-02 VITALS
HEART RATE: 69 BPM | BODY MASS INDEX: 21.17 KG/M2 | OXYGEN SATURATION: 97 % | WEIGHT: 124 LBS | SYSTOLIC BLOOD PRESSURE: 115 MMHG | TEMPERATURE: 98.4 F | DIASTOLIC BLOOD PRESSURE: 71 MMHG | HEIGHT: 64 IN | RESPIRATION RATE: 22 BRPM

## 2022-12-02 DIAGNOSIS — J02.0 ACUTE STREPTOCOCCAL PHARYNGITIS: Primary | ICD-10-CM

## 2022-12-02 LAB — DEPRECATED S PYO AG THROAT QL EIA: POSITIVE

## 2022-12-02 PROCEDURE — 87880 STREP A ASSAY W/OPTIC: CPT | Performed by: PREVENTIVE MEDICINE

## 2022-12-02 PROCEDURE — 99213 OFFICE O/P EST LOW 20 MIN: CPT | Performed by: PREVENTIVE MEDICINE

## 2022-12-02 RX ORDER — AMOXICILLIN 500 MG/1
500 CAPSULE ORAL 2 TIMES DAILY
Qty: 20 CAPSULE | Refills: 0 | Status: SHIPPED | OUTPATIENT
Start: 2022-12-02 | End: 2022-12-12

## 2022-12-02 ASSESSMENT — PAIN SCALES - GENERAL: PAINLEVEL: SEVERE PAIN (6)

## 2022-12-02 NOTE — PROGRESS NOTES
Assessment & Plan     Acute streptococcal pharyngitis  - Streptococcus A Rapid Screen w/Reflex to PCR - Clinic Collect  - amoxicillin (AMOXIL) 500 MG capsule  Dispense: 20 capsule; Refill: 0  -positive for strep+    -Hand washing  -Hydration and monitor temperature  -Saline gargles  -tylenol or Ibuprofen as needed   -Contagious nature discussed   -ER precautions, if drooling, persistent fever then needs to be seen in ER due to risk of danis tonsillar abscess.   -Complete full course of antibiotics  -Will not be contagious after 24 hours of antibiotics       Ordering of each unique test  Prescription drug management  15 minutes spent on the date of the encounter doing chart review, history and exam, documentation and further activities per the note         Return in about 1 week (around 12/9/2022) if symptoms worsen or fail to improve.    Janet Loyola MD MPH    [facility] Institute[facility]ORD[facility] Amador is a 37 year old presenting for the following health issues:  Pharyngitis      History of Present Illness       Reason for visit:  Sore throat, swollen/painful left lymph node, left ear pain,  red left eye with sensitivity to light  Symptom onset:  3-7 days ago  Symptoms include:  Sore throat, swollen/painful left lymph node, left ear pain, red left eye with sensitivity to light  Symptom intensity:  Moderate  Symptom progression:  Worsening  Had these symptoms before:  No  What makes it worse:  Throat painful every time I swallow, both sides but more on left side.  What makes it better:  I have been taking Tylenol 1,000 mg four times daily for the past 2 days and throat still painful with every swallow.  I tested negative goe Covid today.    She eats 4 or more servings of fruits and vegetables daily.She consumes 1 sweetened beverage(s) daily.She exercises with enough effort to increase her heart rate 30 to 60 minutes per day.  She exercises with enough effort to increase her heart rate 6 days per  "week.   She is taking medications regularly.     Covid test was negative this morning  No sick contacts  Has kids+  Went to [place].  Does not wear contact lenses  No trauma to eye  No GI symptoms  No cough  No nasal symptoms  Eyes symptoms for 6 days  Throat pain for 5 days   No abdominal pain  No skin rash  No urin changes     Review of Systems   Constitutional, HEENT, cardiovascular, pulmonary, gi and gu systems are negative, except as otherwise noted.      Objective    /71 (BP Location: Left arm, Patient Position: Sitting, Cuff Size: Adult Regular)   Pulse 69   Temp 98.4  F (36.9  C) (Oral)   Resp 22   Ht 1.619 m (5' 3.75\")   Wt 56.2 kg (124 lb)   SpO2 97%   BMI 21.45 kg/m    Body mass index is 21.45 kg/m .  Physical Exam   GENERAL APPEARANCE: healthy, alert and no distress  EYES: Conjunctival injections of the left eye+   HENT: Mild pharyngeal erythema, no exudates or pus points, no uvular deviation.   NECK: few enlarged cervical nodes and trachea midline and normal to palpation  RESP: lungs clear to auscultation - no rales, rhonchi or wheezes  CV: regular rates and rhythm, normal S1 S2  ABDOMEN: soft, non-tender and no rebound or guarding   MS: extremities normal- no gross deformities noted   SKIN: no suspicious lesions or rashes  NEURO: Normal strength and tone, mentation intact and speech normal  PSYCH: mentation appears normal      Results for orders placed or performed in visit on 12/02/22 (from the past 24 hour(s))   Streptococcus A Rapid Screen w/Reflex to PCR - Clinic Collect    Specimen: Throat; Swab   Result Value Ref Range    Group A Strep antigen Positive (A) Negative               "

## 2022-12-02 NOTE — RESULT ENCOUNTER NOTE
Marjorie,     Strep test is Positive. I have sent antibiotics to your pharmacy, please review the following information:     -Hand washing  -Hydration and monitor temperature  -Saline gargles  -tylenol or Ibuprofen as needed   -Contagious nature discussed   -ER precautions, if drooling, persistent fever then needs to be seen in ER due to risk of danis tonsillar abscess.   -Complete full course of antibiotics  -Will not be contagious after 24 hours of antibiotics       Please do not hesitate to call us at [phone] if you have any questions or concerns.    Thank you,    Janet Loyola MD MPH

## 2023-03-06 ENCOUNTER — HOSPITAL ENCOUNTER (OUTPATIENT)
Dept: MAMMOGRAPHY | Facility: CLINIC | Age: 38
Discharge: HOME | End: 2023-03-06
Attending: NURSE PRACTITIONER | Admitting: NURSE PRACTITIONER
Payer: COMMERCIAL

## 2023-03-06 DIAGNOSIS — Z12.31 ENCOUNTER FOR SCREENING MAMMOGRAM FOR BREAST CANCER: ICD-10-CM

## 2023-03-06 PROCEDURE — 77067 SCR MAMMO BI INCL CAD: CPT

## 2023-04-04 ENCOUNTER — MEDICAL CORRESPONDENCE (OUTPATIENT)
Dept: HEALTH INFORMATION MANAGEMENT | Facility: CLINIC | Age: 38
End: 2023-04-04
Payer: COMMERCIAL

## 2023-04-11 DIAGNOSIS — L30.9 DERMATITIS: ICD-10-CM

## 2023-04-11 RX ORDER — TRIAMCINOLONE ACETONIDE 5 MG/G
OINTMENT TOPICAL
Qty: 80 G | Refills: 1 | Status: SHIPPED | OUTPATIENT
Start: 2023-04-11 | End: 2023-11-13

## 2023-07-17 ENCOUNTER — OFFICE VISIT (OUTPATIENT)
Dept: FAMILY MEDICINE | Facility: CLINIC | Age: 38
End: 2023-07-17
Payer: COMMERCIAL

## 2023-07-17 VITALS
BODY MASS INDEX: 21.51 KG/M2 | SYSTOLIC BLOOD PRESSURE: 121 MMHG | HEART RATE: 66 BPM | OXYGEN SATURATION: 97 % | HEIGHT: 64 IN | TEMPERATURE: 97.9 F | DIASTOLIC BLOOD PRESSURE: 79 MMHG | RESPIRATION RATE: 16 BRPM | WEIGHT: 126 LBS

## 2023-07-17 DIAGNOSIS — Z71.84 TRAVEL ADVICE ENCOUNTER: Primary | ICD-10-CM

## 2023-07-17 PROCEDURE — 99402 PREV MED CNSL INDIV APPRX 30: CPT | Mod: 25 | Performed by: NURSE PRACTITIONER

## 2023-07-17 PROCEDURE — 90472 IMMUNIZATION ADMIN EACH ADD: CPT | Mod: GA | Performed by: NURSE PRACTITIONER

## 2023-07-17 PROCEDURE — 90691 TYPHOID VACCINE IM: CPT | Mod: GA | Performed by: NURSE PRACTITIONER

## 2023-07-17 PROCEDURE — 90471 IMMUNIZATION ADMIN: CPT | Mod: GA | Performed by: NURSE PRACTITIONER

## 2023-07-17 PROCEDURE — 90717 YELLOW FEVER VACCINE SUBQ: CPT | Mod: GA | Performed by: NURSE PRACTITIONER

## 2023-07-17 RX ORDER — AZITHROMYCIN 500 MG/1
500 TABLET, FILM COATED ORAL DAILY
Qty: 3 TABLET | Refills: 0 | Status: SHIPPED | OUTPATIENT
Start: 2023-07-17 | End: 2023-07-20

## 2023-07-17 RX ORDER — ATOVAQUONE AND PROGUANIL HYDROCHLORIDE 250; 100 MG/1; MG/1
1 TABLET, FILM COATED ORAL DAILY
Qty: 30 TABLET | Refills: 0 | Status: SHIPPED | OUTPATIENT
Start: 2023-07-17 | End: 2023-11-13

## 2023-07-17 RX ORDER — ONDANSETRON 4 MG/1
4 TABLET, ORALLY DISINTEGRATING ORAL EVERY 8 HOURS PRN
Qty: 10 TABLET | Refills: 0 | Status: SHIPPED | OUTPATIENT
Start: 2023-07-17 | End: 2023-11-13

## 2023-07-17 ASSESSMENT — PAIN SCALES - GENERAL: PAINLEVEL: NO PAIN (0)

## 2023-07-17 NOTE — PROGRESS NOTES
"Nurse Note ( Pre-Travel Consult)      Itinerary:  Count includes the [facility]      Departure Date: 07/22/2023      Return Date: 08/09/2023      Length of Trip 2.5 weeks      Reason for Travel: Visiting friends and relatives           Urban or rural: urban      Accommodations: Hotel    Family home    airbnb        IMMUNIZATION HISTORY  Have you received any immunizations within the past 4 weeks?  Yes-Polio  Have you ever fainted from having your blood drawn or from an injection?  No  Have you ever had a fever reaction to vaccination?  No  Have you ever had any bad reaction or side effect from any vaccination?  No  Have you ever had hepatitis A or B vaccine?  Yes  Do you live (or work closely) with anyone who has AIDS, an AIDS-like condition, any other immune disorder or who is on chemotherapy for cancer?  No  Do you have a family history of immunodeficiency?  No  Have you received any injection of immune globulin or any blood products during the past 12 months?  No    Patient roomed by ALFREDO Nuñez  Marjorie Lee is a 38 year old female seen today alone for counsultation for international travel.   Patient will be departing in  6 day(s) and  traveling with family member(s).      Patient itinerary :  will be in the urban region of  [facility] which risk for Malaria and Yellow Fever. exposure.      Patient's activities will include visiting friends and relatives.    Patient's country of birth is [place]  `  Special medical concerns: IUD  Pre-travel questionnaire was completed by patient and reviewed by provider.     Vitals: /79 (BP Location: Left arm, Patient Position: Sitting, Cuff Size: Adult Regular)   Pulse 66   Temp 97.9  F (36.6  C) (Temporal)   Resp 16   Ht 1.613 m (5' 3.5\")   Wt 57.2 kg (126 lb)   LMP 07/12/2023 (Approximate)   SpO2 97%   BMI 21.97 kg/m    BMI= Body mass index is 21.97 kg/m .    EXAM:  General:  Well-nourished, well-developed in no acute distress.  Appears to be stated age, interacts " appropriately and expresses understanding of information given to patient.    Current Outpatient Medications   Medication Sig Dispense Refill     levonorgestrel (MIRENA) 20 MCG/24HR IUD 1 each (20 mcg) by Intrauterine route continuous       Multiple Vitamins-Minerals (MULTIVITAMIN PO) Take 1 tablet by mouth daily        triamcinolone (KENALOG) 0.5 % external ointment Apply in thin layer to affected areas twice a day.  Use for no longer than two weeks at a time. 80 g 1     Patient Active Problem List   Diagnosis     IUD (intrauterine device) in place     Abnormal colonoscopy     Rectal bleeding     Piriformis syndrome, right     Urge incontinence of urine     Somatic dysfunction of pelvis region     Ischemic colitis (H)     Allergies   Allergen Reactions     No Known Allergies          Immunizations discussed include:   Covid 19: Up to date  Hepatitis A:  Up to date  Hepatitis B: Up to date  Influenza: vaccine is not available  Typhoid: Ordered/given today, risks, benefits and side effects reviewed  Rabies: Declined  reviewed managment of a animal bite or scratch (washing wound, seek medical care within 24 hours for post exposure prophylaxis )  Yellow Fever: Yellow Fever ordered/given today - side effects, precautions, allergies, risks discussed. Patient expressed understanding.  Maldivian Encephalitis: Not indicated  Meningococcus: low risk off season   Tetanus/Diphtheria: Up to date  Measles/Mumps/Rubella: Up to date  Cholera: Not needed  Polio: Up to date  Pneumococcal: Under age of 65  Varicella: Immune by disease history per patient report  Shingrix: Not indicated  HPV:  Not indicated     TB: low risk     Altitude Exposure on this trip: no  Past tolerance to Altitude: na    ASSESSMENT/PLAN:  Marjorie was seen today for travel clinic.    Diagnoses and all orders for this visit:    Travel advice encounter  -     YELLOW FEVER, LIVE SQ  -     atovaquone-proguanil (MALARONE) 250-100 MG tablet; Take 1 tablet by mouth  daily Start 2 days before exposure to Malaria and continue daily till  7 days after exposure.  -     azithromycin (ZITHROMAX) 500 MG tablet; Take 1 tablet (500 mg) by mouth daily for 3 doses Take 1 tablet a day for up to 3 days for severe diarrhea    Other orders  -     TYPHOID VACCINE, IM      I have reviewed general recommendations for safe travel   including: food/water precautions, insect precautions,    roadway safety. Educational materials and [employer] report provided.    Malaraia prophylaxis recommended: Malarone  Symptomatic treatment for traveler's diarrhea: azithromycin  Altitude illness prevention and treatment: Na      Evacuation insurance advised and resources were provided to patient.    Total visit time 30 minutes  with over 50% of time spent counseling patient and shared decision making as detailed above.    Julisa Locke CNP  Certificate in Travel Health

## 2023-07-17 NOTE — PATIENT INSTRUCTIONS
Thank you for visiting the [facility] : [phone]  Today July 17, 2023 you received the    Yellow Fever (YF)    Typhoid - injectable. This vaccine is valid for two years.     Follow up vaccine appointments can be made as a NURSE ONLY visit at the Travel Clinic, (BE PREPARED TO WAIT, ) or at designated [facility].    If you are receiving the Rabies vaccines series, it is important that you follow the exact schedule ordered.     Pre-travel     We recommend that you purchase Medical Evacuation Insurance prior to your departure.  Https://wwwnc.cdc.gov/travel/page/insurance    [place] your travel plans with the  Department of Tactilize through STEP ( Smart Traveler Enrollment Program ) https://step.state.gov.  STEP is a free service to allow [place] citizens and nationals traveling and living abroad to enroll their trip with the nearest U.S. Embassy or Consulate.    Animal Exposure: Avoid all mammals even if they look healthy.  If there is a bite, scratch or even a lick, wash area immediately with soap and water for 15 minutes and seek medical care within 24 hours for evaluation of Rabies post exposure treatment.  Contact your Medical Evacuation Insurance.    Repiratory illness prevention strategies ( Covid and Influenza ) CDC recommendations:  Consider wearing a mask in crowded or poorly ventilated indoor areas, including on public transportation and in transportation hubs.  Hand washing: frequent, thorough handwashing with soap and water for 20 seconds. Use an alcohol-based hand  with at least 60% alcohol if soap and water are not readily available. Avoid touching face, nose, eyes, mouth unless you have done appropriate hand washing as above.  VACCINES: Staying up to date on COVID-19 vaccines significantly lowers the risk of getting very sick, being hospitalized, or dying from COVID-19. CDC recommends that everyone stay up to date on their COVID-19 vaccines,  especially people with weakened immune systems.    Travel Covid 19 Testing:  updated 12/06/2021  International travelers: Pre-travel:  See country specific Embassy websites or airline websites.    Post travel: CDC recommends getting tested 3-5 days after your trip     Post-travel illness:  Contact your provider or [facility] if you develop a fever, rash, cough, diarrhea or other symptoms for up to 1 year after travel.  Inform your healthcare provider when and where you traveled to.    Please call the [facility] with any questions [phone]  Or send your provider a 'My Chart' note.

## 2023-07-17 NOTE — NURSING NOTE
Prior to immunization administration, verified patients identity using patient s name and date of birth. Please see Immunization Activity for additional information.     Screening Questionnaire for Adult Immunization    Are you sick today?   No   Do you have allergies to medications, food, a vaccine component or latex?   No   Have you ever had a serious reaction after receiving a vaccination?   No   Do you have a long-term health problem with heart, lung, kidney, or metabolic disease (e.g., diabetes), asthma, a blood disorder, no spleen, complement component deficiency, a cochlear implant, or a spinal fluid leak?  Are you on long-term aspirin therapy?   No   Do you have cancer, leukemia, HIV/AIDS, or any other immune system problem?   No   Do you have a parent, brother, or sister with an immune system problem?   No   In the past 3 months, have you taken medications that affect  your immune system, such as prednisone, other steroids, or anticancer drugs; drugs for the treatment of rheumatoid arthritis, Crohn s disease, or psoriasis; or have you had radiation treatments?   No   Have you had a seizure, or a brain or other nervous system problem?   No   During the past year, have you received a transfusion of blood or blood    products, or been given immune (gamma) globulin or antiviral drug?   No   For women: Are you pregnant or is there a chance you could become       pregnant during the next month?   No   Have you received any vaccinations in the past 4 weeks?   No     Immunization questionnaire answers were all negative.      Patient instructed to remain in clinic for 15 minutes afterwards, and to report any adverse reactions.     Screening performed by Connie Chavez on 7/17/2023 at 4:38 PM.

## 2023-08-11 ENCOUNTER — MYC MEDICAL ADVICE (OUTPATIENT)
Dept: GASTROENTEROLOGY | Facility: CLINIC | Age: 38
End: 2023-08-11
Payer: COMMERCIAL

## 2023-08-11 NOTE — TELEPHONE ENCOUNTER
Replied to [employer] message that responses will be forwarded to provider.     Radha Ellis RN

## 2023-11-06 ASSESSMENT — ENCOUNTER SYMPTOMS
CHILLS: 0
FREQUENCY: 0
DYSURIA: 0
SHORTNESS OF BREATH: 0
PALPITATIONS: 0
ABDOMINAL PAIN: 0
PARESTHESIAS: 0
JOINT SWELLING: 1
NERVOUS/ANXIOUS: 1
HEMATOCHEZIA: 0
CONSTIPATION: 0
WEAKNESS: 0
COUGH: 0
HEMATURIA: 0
HEARTBURN: 0
HEADACHES: 0
ARTHRALGIAS: 1
DIZZINESS: 0
NAUSEA: 0
EYE PAIN: 0
BREAST MASS: 0
DIARRHEA: 0
SORE THROAT: 0
MYALGIAS: 1
FEVER: 0

## 2023-11-13 ENCOUNTER — OFFICE VISIT (OUTPATIENT)
Dept: FAMILY MEDICINE | Facility: CLINIC | Age: 38
End: 2023-11-13
Payer: COMMERCIAL

## 2023-11-13 VITALS
BODY MASS INDEX: 21.73 KG/M2 | HEIGHT: 64 IN | HEART RATE: 61 BPM | WEIGHT: 127.3 LBS | SYSTOLIC BLOOD PRESSURE: 117 MMHG | OXYGEN SATURATION: 99 % | TEMPERATURE: 98.7 F | DIASTOLIC BLOOD PRESSURE: 70 MMHG

## 2023-11-13 DIAGNOSIS — Z13.1 SCREENING FOR DIABETES MELLITUS (DM): ICD-10-CM

## 2023-11-13 DIAGNOSIS — Z13.21 ENCOUNTER FOR VITAMIN DEFICIENCY SCREENING: ICD-10-CM

## 2023-11-13 DIAGNOSIS — L30.9 DERMATITIS: ICD-10-CM

## 2023-11-13 DIAGNOSIS — Z23 HIGH PRIORITY FOR 2019-NCOV VACCINE: ICD-10-CM

## 2023-11-13 DIAGNOSIS — Z87.19 HISTORY OF ISCHEMIC COLITIS: ICD-10-CM

## 2023-11-13 DIAGNOSIS — Z13.29 SCREENING FOR THYROID DISORDER: ICD-10-CM

## 2023-11-13 DIAGNOSIS — Z13.0 SCREENING FOR DISORDER OF BLOOD AND BLOOD-FORMING ORGANS: ICD-10-CM

## 2023-11-13 DIAGNOSIS — R94.6 ABNORMAL FINDING ON THYROID FUNCTION TEST: ICD-10-CM

## 2023-11-13 DIAGNOSIS — Z13.6 CARDIOVASCULAR SCREENING; LDL GOAL LESS THAN 160: ICD-10-CM

## 2023-11-13 DIAGNOSIS — Z00.00 ROUTINE GENERAL MEDICAL EXAMINATION AT A HEALTH CARE FACILITY: Primary | ICD-10-CM

## 2023-11-13 LAB
ALBUMIN SERPL BCG-MCNC: 4.4 G/DL (ref 3.5–5.2)
ALP SERPL-CCNC: 63 U/L (ref 35–104)
ALT SERPL W P-5'-P-CCNC: 30 U/L (ref 0–50)
ANION GAP SERPL CALCULATED.3IONS-SCNC: 10 MMOL/L (ref 7–15)
AST SERPL W P-5'-P-CCNC: 29 U/L (ref 0–45)
BILIRUB SERPL-MCNC: 0.4 MG/DL
BUN SERPL-MCNC: 10.2 MG/DL (ref 6–20)
CALCIUM SERPL-MCNC: 8.9 MG/DL (ref 8.6–10)
CHLORIDE SERPL-SCNC: 104 MMOL/L (ref 98–107)
CHOLEST SERPL-MCNC: 167 MG/DL
CREAT SERPL-MCNC: 0.7 MG/DL (ref 0.51–0.95)
DEPRECATED HCO3 PLAS-SCNC: 24 MMOL/L (ref 22–29)
EGFRCR SERPLBLD CKD-EPI 2021: >90 ML/MIN/1.73M2
ERYTHROCYTE [DISTWIDTH] IN BLOOD BY AUTOMATED COUNT: 12.8 % (ref 10–15)
FERRITIN SERPL-MCNC: 46 NG/ML (ref 6–175)
GLUCOSE SERPL-MCNC: 87 MG/DL (ref 70–99)
HCT VFR BLD AUTO: 38.1 % (ref 35–47)
HDLC SERPL-MCNC: 69 MG/DL
HGB BLD-MCNC: 12.5 G/DL (ref 11.7–15.7)
LDLC SERPL CALC-MCNC: 87 MG/DL
MCH RBC QN AUTO: 31.2 PG (ref 26.5–33)
MCHC RBC AUTO-ENTMCNC: 32.8 G/DL (ref 31.5–36.5)
MCV RBC AUTO: 95 FL (ref 78–100)
NONHDLC SERPL-MCNC: 98 MG/DL
PLATELET # BLD AUTO: 208 10E3/UL (ref 150–450)
POTASSIUM SERPL-SCNC: 3.7 MMOL/L (ref 3.4–5.3)
PROT SERPL-MCNC: 7.7 G/DL (ref 6.4–8.3)
RBC # BLD AUTO: 4.01 10E6/UL (ref 3.8–5.2)
SODIUM SERPL-SCNC: 138 MMOL/L (ref 135–145)
TRIGL SERPL-MCNC: 54 MG/DL
TSH SERPL DL<=0.005 MIU/L-ACNC: 4.78 UIU/ML (ref 0.3–4.2)
VIT B12 SERPL-MCNC: 998 PG/ML (ref 232–1245)
VIT D+METAB SERPL-MCNC: 47 NG/ML (ref 20–50)
WBC # BLD AUTO: 5.7 10E3/UL (ref 4–11)

## 2023-11-13 PROCEDURE — 85027 COMPLETE CBC AUTOMATED: CPT | Performed by: NURSE PRACTITIONER

## 2023-11-13 PROCEDURE — 82306 VITAMIN D 25 HYDROXY: CPT | Performed by: NURSE PRACTITIONER

## 2023-11-13 PROCEDURE — 80053 COMPREHEN METABOLIC PANEL: CPT | Performed by: NURSE PRACTITIONER

## 2023-11-13 PROCEDURE — 80061 LIPID PANEL: CPT | Performed by: NURSE PRACTITIONER

## 2023-11-13 PROCEDURE — 84443 ASSAY THYROID STIM HORMONE: CPT | Performed by: NURSE PRACTITIONER

## 2023-11-13 PROCEDURE — 91320 SARSCV2 VAC 30MCG TRS-SUC IM: CPT | Performed by: NURSE PRACTITIONER

## 2023-11-13 PROCEDURE — 90480 ADMN SARSCOV2 VAC 1/ONLY CMP: CPT | Performed by: NURSE PRACTITIONER

## 2023-11-13 PROCEDURE — 36415 COLL VENOUS BLD VENIPUNCTURE: CPT | Performed by: NURSE PRACTITIONER

## 2023-11-13 PROCEDURE — 84439 ASSAY OF FREE THYROXINE: CPT | Performed by: NURSE PRACTITIONER

## 2023-11-13 PROCEDURE — 82728 ASSAY OF FERRITIN: CPT | Performed by: NURSE PRACTITIONER

## 2023-11-13 PROCEDURE — 82607 VITAMIN B-12: CPT | Performed by: NURSE PRACTITIONER

## 2023-11-13 PROCEDURE — 99396 PREV VISIT EST AGE 40-64: CPT | Mod: 25 | Performed by: NURSE PRACTITIONER

## 2023-11-13 PROCEDURE — 86376 MICROSOMAL ANTIBODY EACH: CPT | Performed by: NURSE PRACTITIONER

## 2023-11-13 RX ORDER — TRIAMCINOLONE ACETONIDE 5 MG/G
OINTMENT TOPICAL
Qty: 80 G | Refills: 1 | Status: SHIPPED | OUTPATIENT
Start: 2023-11-13

## 2023-11-13 ASSESSMENT — ENCOUNTER SYMPTOMS
DIZZINESS: 0
JOINT SWELLING: 1
HEMATOCHEZIA: 0
EYE PAIN: 0
CHILLS: 0
NAUSEA: 0
NERVOUS/ANXIOUS: 1
ARTHRALGIAS: 1
DYSURIA: 0
PARESTHESIAS: 0
HEADACHES: 0
WEAKNESS: 0
SHORTNESS OF BREATH: 0
FREQUENCY: 0
DIARRHEA: 0
FEVER: 0
PALPITATIONS: 0
CONSTIPATION: 0
ABDOMINAL PAIN: 0
BREAST MASS: 0
HEMATURIA: 0
MYALGIAS: 1
SORE THROAT: 0
COUGH: 0
HEARTBURN: 0

## 2023-11-13 ASSESSMENT — PAIN SCALES - GENERAL: PAINLEVEL: NO PAIN (0)

## 2023-11-13 NOTE — PATIENT INSTRUCTIONS
PLAN:   1.   Symptomatic therapy suggested: Increase calcium to 1000mg and 800iu Vit D .  2.  Orders Placed This Encounter   Medications    triamcinolone (KENALOG) 0.5 % external ointment     Sig: Apply in thin layer to affected areas twice a day.  Use for no longer than two weeks at a time. Patient will call when needed     Dispense:  80 g     Refill:  1     Orders Placed This Encounter   Procedures    REVIEW OF HEALTH MAINTENANCE PROTOCOL ORDERS    COVID-19 12+ (2023-24) (PFIZER)    Lipid panel reflex to direct LDL Fasting    CBC with platelets    Comprehensive metabolic panel    TSH with free T4 reflex    Ferritin    Vitamin B12    Thyroid peroxidase antibody    Vitamin D Deficiency     3. Will follow up and/or notify patient of  results via My Chart to determine further need for followup  Follow up office visit in one year for annual health maintenance exam, sooner PRN.   Patient needs to follow up in if no improvement,or sooner if worsening of symptoms or other symptoms develop.          Preventive Health Recommendations  Female Ages 40 to 49    Yearly exam:   See your health care provider every year in order to  Review health changes.   Discuss preventive care.    Review your medicines if your doctor prescribed any.    Get a Pap test every three years (unless you have an abnormal result and your provider advises testing more often).    If you get Pap tests with HPV test, you only need to test every 5 years, unless you have an abnormal result. You do not need a Pap test if your uterus was removed (hysterectomy) and you have not had cancer.    You should be tested each year for STDs (sexually transmitted diseases), if you're at risk.   Ask your doctor if you should have a mammogram.    Have a colonoscopy (test for colon cancer) beginning at age 45.  Ask your provider about other options like a yearly FIT test or Cologuard test every 3 years (stool tests)      Have a cholesterol test every 5 years.     Have a  diabetes test (fasting glucose) after age 45. If you are at risk for diabetes, you should have this test every 3 years.    Shots: Get a flu shot each year. Get a tetanus shot every 10 years.     Nutrition:   Eat at least 5 servings of fruits and vegetables each day.  Eat whole-grain bread, whole-wheat pasta and brown rice instead of white grains and rice.  Get adequate Calcium and Vitamin D.      Lifestyle  Exercise at least 150 minutes a week (an average of 30 minutes a day, 5 days a week). This will help you control your weight and prevent disease.  Limit alcohol to one drink per day.  No smoking.   Wear sunscreen to prevent skin cancer.  See your dentist every six months for an exam and cleaning.

## 2023-11-13 NOTE — RESULT ENCOUNTER NOTE
Neena Lee,    Attached are your test results.  -Normal red blood cell (hgb) levels, normal white blood cell count and normal platelet levels.   Please contact us if you have any questions.    Holley Noe, CNP

## 2023-11-13 NOTE — PROGRESS NOTES
Saeed Amador is a 38 year old, presenting for the following health issues:  Thyroid Problem (Check and ferritin) and Imm/Inj (Would like Covid shot)      11/13/2023    10:48 AM   Additional Questions   Roomed by Kev read   Accompanied by self         11/13/2023    10:48 AM   Patient Reported Additional Medications   Patient reports taking the following new medications none       Healthy Habits:     Getting at least 3 servings of Calcium per day:  Yes    Bi-annual eye exam:  NO    Dental care twice a year:  Yes    Sleep apnea or symptoms of sleep apnea:  None    Diet:  Regular (no restrictions)    Frequency of exercise:  6-7 days/week    Duration of exercise:  Greater than 60 minutes    Taking medications regularly:  Yes    Medication side effects:  Not applicable    Additional concerns today:  No     Check thyroid and ferritin      Lab work is in process  Labs reviewed in EPIC  BP Readings from Last 3 Encounters:   11/13/23 117/70   07/17/23 121/79   12/02/22 115/71    Wt Readings from Last 3 Encounters:   11/13/23 57.7 kg (127 lb 4.8 oz)   07/17/23 57.2 kg (126 lb)   12/02/22 56.2 kg (124 lb)                  Patient Active Problem List   Diagnosis    IUD (intrauterine device) in place    Abnormal colonoscopy    Rectal bleeding    Piriformis syndrome, right    Urge incontinence of urine    Somatic dysfunction of pelvis region    Ischemic colitis (H24)     Past Surgical History:   Procedure Laterality Date    COLONOSCOPY N/A 09/03/2019    Procedure: Colonoscopy, With Polypectomy And Biopsy;  Surgeon: Radha Neri DO;  Location: [facility]    COLONOSCOPY N/A 03/05/2020    Procedure: Colonoscopy, With Polypectomy And Biopsy;  Surgeon: Radha Neri DO;  Location: [facility]    COLONOSCOPY WITH CO2 INSUFFLATION N/A 09/03/2019    Procedure: COLONOSCOPY, WITH CO2 INSUFFLATION;  Surgeon: Radha Neri DO;  Location: [facility]    COLONOSCOPY WITH CO2 INSUFFLATION N/A 03/05/2020    Procedure: COLONOSCOPY, WITH CO2  INSUFFLATION;  Surgeon: Radha Neri DO;  Location: [facility]    EYE SURGERY      chalazion removal       Social History     Tobacco Use    Smoking status: Never     Passive exposure: Never    Smokeless tobacco: Never   Substance Use Topics    Alcohol use: Yes     Comment: occ     Family History   Problem Relation Age of Onset    Osteoarthritis Mother     Hypertension Mother     Hyperlipidemia Mother     Osteoarthritis Father     Hypertension Father     Breast Cancer Maternal Aunt     Other - See Comments Paternal Uncle         glioblastoma    Other - See Comments Paternal Uncle         glioblastoma    Colon Cancer Maternal Grandmother     Diabetes Maternal Grandmother     Obesity Maternal Grandmother     Colon Cancer Paternal Grandmother     Diabetes Other         maternal uncle    Coronary Artery Disease Other         maternal uncle    Obesity Other     Diabetes Other         maternal aunt    Breast Cancer Other     Substance Abuse Brother         alcohol abuse    Asthma Niece     Asthma Sister          Current Outpatient Medications   Medication Sig Dispense Refill    levonorgestrel (MIRENA) 20 MCG/24HR IUD 1 each (20 mcg) by Intrauterine route continuous      Multiple Vitamins-Minerals (MULTIVITAMIN PO) Take 1 tablet by mouth daily       triamcinolone (KENALOG) 0.5 % external ointment Apply in thin layer to affected areas twice a day.  Use for no longer than two weeks at a time. Patient will call when needed 80 g 1     Allergies   Allergen Reactions    No Known Allergies          Review of Systems   Constitutional:  Negative for chills and fever.   HENT:  Negative for congestion, ear pain, hearing loss and sore throat.    Eyes:  Negative for pain and visual disturbance.   Respiratory:  Negative for cough and shortness of breath.    Cardiovascular:  Negative for chest pain, palpitations and peripheral edema.   Gastrointestinal:  Negative for abdominal pain, constipation, diarrhea, heartburn, hematochezia and  "nausea.   Breasts:  Negative for tenderness, breast mass and discharge.   Genitourinary:  Negative for dysuria, frequency, genital sores, hematuria, pelvic pain, urgency, vaginal bleeding and vaginal discharge.   Musculoskeletal:  Positive for arthralgias, joint swelling and myalgias.        Some intermittent joint pain but usually trigger by some type of physical activity   Brief and goes away    Skin:  Negative for rash.   Neurological:  Negative for dizziness, weakness, headaches and paresthesias.   Psychiatric/Behavioral:  Negative for mood changes. The patient is nervous/anxious.         Sleeps well   Not an issue   Has good support             Objective    /70 (BP Location: Right arm, Patient Position: Sitting, Cuff Size: Adult Regular)   Pulse 61   Temp 98.7  F (37.1  C) (Oral)   Ht 1.633 m (5' 4.3\")   Wt 57.7 kg (127 lb 4.8 oz)   SpO2 99%   BMI 21.65 kg/m    Body mass index is 21.65 kg/m .  Physical Exam   GENERAL: healthy, alert and no distress  EYES: Eyes grossly normal to inspection and conjunctivae and sclerae normal  HENT: ear canals and TM's normal, nose and mouth without ulcers or lesions  NECK: no adenopathy, no asymmetry, masses, or scars and thyroid normal to palpation  RESP: lungs clear to auscultation - no rales, rhonchi or wheezes  BREAST: normal without masses, tenderness or nipple discharge and no palpable axillary masses or adenopathy  CV: regular rates and rhythm, no murmur, click or rub, peripheral pulses strong, and no peripheral edema  ABDOMEN: soft, nontender, no hepatosplenomegaly, no masses and bowel sounds normal   (female): normal female external genitalia, normal urethral meatus, vaginal mucosa pink, moist, well rugated, and normal cervix/adnexa/uterus without masses or discharge  MS: no gross musculoskeletal defects noted, no edema  SKIN: no suspicious lesions or rashes  NEURO: Normal strength and tone, mentation intact and speech normal  PSYCH: mentation appears " normal, affect normal/bright  LYMPH: no cervical, supraclavicular, axillary, or inguinal adenopathy    Results for orders placed or performed in visit on 11/13/23   Lipid panel reflex to direct LDL Fasting     Status: Normal   Result Value Ref Range    Cholesterol 167 <200 mg/dL    Triglycerides 54 <150 mg/dL    Direct Measure HDL 69 >=50 mg/dL    LDL Cholesterol Calculated 87 <=100 mg/dL    Non HDL Cholesterol 98 <130 mg/dL    Narrative    Cholesterol  Desirable:  <200 mg/dL    Triglycerides  Normal:  Less than 150 mg/dL  Borderline High:  150-199 mg/dL  High:  200-499 mg/dL  Very High:  Greater than or equal to 500 mg/dL    Direct Measure HDL  Female:  Greater than or equal to 50 mg/dL   Male:  Greater than or equal to 40 mg/dL    LDL Cholesterol  Desirable:  <100mg/dL  Above Desirable:  100-129 mg/dL   Borderline High:  130-159 mg/dL   High:  160-189 mg/dL   Very High:  >= 190 mg/dL    Non HDL Cholesterol  Desirable:  130 mg/dL  Above Desirable:  130-159 mg/dL  Borderline High:  160-189 mg/dL  High:  190-219 mg/dL  Very High:  Greater than or equal to 220 mg/dL   CBC with platelets     Status: Normal   Result Value Ref Range    WBC Count 5.7 4.0 - 11.0 10e3/uL    RBC Count 4.01 3.80 - 5.20 10e6/uL    Hemoglobin 12.5 11.7 - 15.7 g/dL    Hematocrit 38.1 35.0 - 47.0 %    MCV 95 78 - 100 fL    MCH 31.2 26.5 - 33.0 pg    MCHC 32.8 31.5 - 36.5 g/dL    RDW 12.8 10.0 - 15.0 %    Platelet Count 208 150 - 450 10e3/uL   Comprehensive metabolic panel     Status: Normal   Result Value Ref Range    Sodium 138 135 - 145 mmol/L    Potassium 3.7 3.4 - 5.3 mmol/L    Carbon Dioxide (CO2) 24 22 - 29 mmol/L    Anion Gap 10 7 - 15 mmol/L    Urea Nitrogen 10.2 6.0 - 20.0 mg/dL    Creatinine 0.70 0.51 - 0.95 mg/dL    GFR Estimate >90 >60 mL/min/1.73m2    Calcium 8.9 8.6 - 10.0 mg/dL    Chloride 104 98 - 107 mmol/L    Glucose 87 70 - 99 mg/dL    Alkaline Phosphatase 63 35 - 104 U/L    AST 29 0 - 45 U/L    ALT 30 0 - 50 U/L    Protein  Total 7.7 6.4 - 8.3 g/dL    Albumin 4.4 3.5 - 5.2 g/dL    Bilirubin Total 0.4 <=1.2 mg/dL   TSH with free T4 reflex     Status: Abnormal   Result Value Ref Range    TSH 4.78 (H) 0.30 - 4.20 uIU/mL   Ferritin     Status: Normal   Result Value Ref Range    Ferritin 46 6 - 175 ng/mL   Vitamin B12     Status: Normal   Result Value Ref Range    Vitamin B12 998 232 - 1,245 pg/mL   Thyroid peroxidase antibody     Status: Normal   Result Value Ref Range    Thyroid Peroxidase Antibody 10 <35 IU/mL   Vitamin D Deficiency     Status: Normal   Result Value Ref Range    Vitamin D, Total (25-Hydroxy) 47 20 - 50 ng/mL    Narrative    Season, race, dietary intake, and treatment affect the concentration of 25-hydroxy-Vitamin D. Values may decrease during winter months and increase during summer months.    Vitamin D determination is routinely performed by an immunoassay specific for 25 hydroxyvitamin D3.  If an individual is on vitamin D2(ergocalciferol) supplementation, please specify 25 OH vitamin D2 and D3 level determination by LCMSMS test VITD23.     T4 free     Status: Normal   Result Value Ref Range    Free T4 1.09 0.90 - 1.70 ng/dL     Assessment & Plan     Routine general medical examination at a health care facility    CARDIOVASCULAR SCREENING; LDL GOAL LESS THAN 160   Lipid panel reflex to direct LDL Fasting    Screening for diabetes mellitus (DM)  - Comprehensive metabolic panel    Screening for disorder of blood and blood-forming organs  - CBC with platelets  - Ferritin  - Vitamin B12    Screening for thyroid disorder  - TSH with free T4 reflex  - Thyroid peroxidase antibody  - T4 free    High priority for 2019-nCoV vaccine  - COVID-19 12+ (2023-24) (PFIZER)    Dermatitis  Refill completed.   - triamcinolone (KENALOG) 0.5 % external ointment  Dispense: 80 g; Refill: 1    Encounter for vitamin deficiency screening  - Vitamin D Deficiency    History of ischemic colitis  Several years ago due to running       Ordering of  each unique test  Prescription drug management   Time spent by me doing chart review, history and exam, documentation and further activities per the note       See Patient Instructions  Patient Instructions   PLAN:   1.   Symptomatic therapy suggested: Increase calcium to 1000mg and 800iu Vit D .  2.  Orders Placed This Encounter   Medications    triamcinolone (KENALOG) 0.5 % external ointment     Sig: Apply in thin layer to affected areas twice a day.  Use for no longer than two weeks at a time. Patient will call when needed     Dispense:  80 g     Refill:  1     Orders Placed This Encounter   Procedures    REVIEW OF HEALTH MAINTENANCE PROTOCOL ORDERS    COVID-19 12+ (2023-24) (PFIZER)    Lipid panel reflex to direct LDL Fasting    CBC with platelets    Comprehensive metabolic panel    TSH with free T4 reflex    Ferritin    Vitamin B12    Thyroid peroxidase antibody    Vitamin D Deficiency     3. Will follow up and/or notify patient of  results via My Chart to determine further need for followup  Follow up office visit in one year for annual health maintenance exam, sooner PRN.   Patient needs to follow up in if no improvement,or sooner if worsening of symptoms or other symptoms develop.          Preventive Health Recommendations  Female Ages 40 to 49    Yearly exam:   See your health care provider every year in order to  Review health changes.   Discuss preventive care.    Review your medicines if your doctor prescribed any.    Get a Pap test every three years (unless you have an abnormal result and your provider advises testing more often).    If you get Pap tests with HPV test, you only need to test every 5 years, unless you have an abnormal result. You do not need a Pap test if your uterus was removed (hysterectomy) and you have not had cancer.    You should be tested each year for STDs (sexually transmitted diseases), if you're at risk.   Ask your doctor if you should have a mammogram.    Have a colonoscopy  (test for colon cancer) beginning at age 45.  Ask your provider about other options like a yearly FIT test or Cologuard test every 3 years (stool tests)      Have a cholesterol test every 5 years.     Have a diabetes test (fasting glucose) after age 45. If you are at risk for diabetes, you should have this test every 3 years.    Shots: Get a flu shot each year. Get a tetanus shot every 10 years.     Nutrition:   Eat at least 5 servings of fruits and vegetables each day.  Eat whole-grain bread, whole-wheat pasta and brown rice instead of white grains and rice.  Get adequate Calcium and Vitamin D.      Lifestyle  Exercise at least 150 minutes a week (an average of 30 minutes a day, 5 days a week). This will help you control your weight and prevent disease.  Limit alcohol to one drink per day.  No smoking.   Wear sunscreen to prevent skin cancer.  See your dentist every six months for an exam and cleaning.    MARCY Hinojosa [facility]

## 2023-11-14 LAB
T4 FREE SERPL-MCNC: 1.09 NG/DL (ref 0.9–1.7)
THYROPEROXIDASE AB SERPL-ACNC: 10 IU/ML

## 2023-11-19 PROBLEM — Z87.19 HISTORY OF ISCHEMIC COLITIS: Status: ACTIVE | Noted: 2022-11-07

## 2023-11-28 NOTE — RESULT ENCOUNTER NOTE
Neena Lee,    Attached are your test results.  -Cholesterol levels (LDL,HDL, Triglycerides) are normal.  ADVISE: rechecking in 1 year.   -Liver and gallbladder tests are normal (ALT,AST, Alk phos, bilirubin), kidney function is normal (Cr, GFR), sodium is normal, potassium is normal, calcium is normal, glucose is normal.  Thyroid antibody is normal   -TSH (thyroid stimulating hormone) level mildly elevated  ADVISE:rechecking your TSH in 6 month.   -Vitamin D level is normal and getting 1000 IU daily in your diet or supplements is recommended.   -Ferritin (iron) level is normal.   Please contact us if you have any questions.    Holley Noe, CNP

## 2024-01-08 ENCOUNTER — OFFICE VISIT (OUTPATIENT)
Dept: FAMILY MEDICINE | Facility: CLINIC | Age: 39
End: 2024-01-08
Payer: COMMERCIAL

## 2024-01-08 ENCOUNTER — ANCILLARY PROCEDURE (OUTPATIENT)
Dept: GENERAL RADIOLOGY | Facility: CLINIC | Age: 39
End: 2024-01-08
Payer: COMMERCIAL

## 2024-01-08 VITALS
SYSTOLIC BLOOD PRESSURE: 136 MMHG | RESPIRATION RATE: 20 BRPM | DIASTOLIC BLOOD PRESSURE: 80 MMHG | HEIGHT: 64 IN | HEART RATE: 61 BPM | BODY MASS INDEX: 22.47 KG/M2 | TEMPERATURE: 98.1 F | OXYGEN SATURATION: 98 % | WEIGHT: 131.6 LBS

## 2024-01-08 DIAGNOSIS — M25.551 BILATERAL HIP PAIN: ICD-10-CM

## 2024-01-08 DIAGNOSIS — M25.552 BILATERAL HIP PAIN: ICD-10-CM

## 2024-01-08 DIAGNOSIS — M25.551 BILATERAL HIP PAIN: Primary | ICD-10-CM

## 2024-01-08 DIAGNOSIS — M25.552 BILATERAL HIP PAIN: Primary | ICD-10-CM

## 2024-01-08 PROCEDURE — 99213 OFFICE O/P EST LOW 20 MIN: CPT

## 2024-01-08 PROCEDURE — 73522 X-RAY EXAM HIPS BI 3-4 VIEWS: CPT | Mod: TC | Performed by: RADIOLOGY

## 2024-01-08 RX ORDER — CYCLOBENZAPRINE HCL 5 MG
5-10 TABLET ORAL 3 TIMES DAILY PRN
Qty: 30 TABLET | Refills: 3 | Status: SHIPPED | OUTPATIENT
Start: 2024-01-08

## 2024-01-08 ASSESSMENT — PAIN SCALES - GENERAL: PAINLEVEL: MILD PAIN (2)

## 2024-01-08 NOTE — PROGRESS NOTES
Assessment & Plan     Bilateral hip pain  - Discussed supportive cares- ice, tylenol and ibuprofen as needed, stretching exercises.  - Will evaluate XR for signs of osteoarthritis.   - Recommended flexeril PRN during flare ups.  - Recommend checking inflammatory markers during next pain flare.   - Follow up with orthopedics.   - cyclobenzaprine (FLEXERIL) 5 MG tablet  Dispense: 30 tablet; Refill: 3  - XR Pelvis w 1 View Each Hip    MARCY Bettencourt CNP [facility] is a 38 year old, presenting for the following health issues:  hip pain (bilateral)      1/8/2024     1:53 PM   Additional Questions   Roomed by veronica         1/8/2024     1:53 PM   Patient Reported Additional Medications   Patient reports taking the following new medications none     History of Present Illness       Reason for visit:  Recurrent hip pain and buttock pain that flares about once every month for the past 1.5 years.  Symptom onset:  More than a month  Symptoms include:  Hip pain, buttock pain, SI joint pain.  During flares (a flare will last a few days to up to a week) pain is to the point I am limping.  Symptom intensity:  Moderate  Symptom progression:  Staying the same  Had these symptoms before:  Yes  Has tried/received treatment for these symptoms:  Yes  Previous treatment was successful:  Yes  Prior treatment description:  Previously seen by Erinn Lange 5/2022.  Treatments included rest and PT.  Recently did 3 chiropractic sessions in 11/2022.  I still have recurrent flares of hip pain.  Would like  X-rays today and Labs to check for inflammatory arthritis work-up.  What makes it worse:  Prolonged inactivity, certain exercise can flare but this is inconsistent.  What makes it better:  Inconsistent response - for example rest sometimes makes it better but sometimes worse.  Mobility helps but other times makes it worse.  In general, flares will last a few days to up to a week.   I would like to talk about trying Gabapentin.    She eats 4 or more servings of fruits and vegetables daily.She consumes 1 sweetened beverage(s) daily.She exercises with enough effort to increase her heart rate 60 or more minutes per day.  She exercises with enough effort to increase her heart rate 5 days per week.   She is taking medications regularly.     Most recent flare up of pain started last week Wednesday in left hip after exercise, then pain felt in right hip within the week. Improves with rest, walking. Has adjusted exercise as a result of pain. Has seen ortho, physical therapy. Dry needling was helpful. Ibuprofen helps but patient limits intake due to history of ischemic colitis.      Review of Systems   Constitutional, HEENT, cardiovascular, pulmonary, gi and gu systems are negative, except as otherwise noted.      Objective    /80 (BP Location: Left arm, Patient Position: Sitting, Cuff Size: Adult Regular)   Pulse 61   Temp 98.1  F (36.7  C) (Tympanic)   Resp 20   Wt 59.7 kg (131 lb 9.6 oz)   LMP 12/25/2023 (Approximate)   SpO2 98%   BMI 22.38 kg/m    Body mass index is 22.38 kg/m .    Physical Exam   GENERAL: healthy, alert and no distress  RESP: No cough or audible wheezing, normal work of breathing.   MS: no gross musculoskeletal defects noted, no edema  SKIN: no suspicious lesions or rashes  NEURO: Normal strength and tone, mentation intact and speech normal  PSYCH: mentation appears normal, affect normal/bright    Xray - Reviewed and interpreted by me. Negative for fracture, dislocation, or osteoarthritis.

## 2024-01-22 ENCOUNTER — OFFICE VISIT (OUTPATIENT)
Dept: ORTHOPEDICS | Facility: CLINIC | Age: 39
End: 2024-01-22
Payer: COMMERCIAL

## 2024-01-22 DIAGNOSIS — M25.552 PAIN IN LEFT HIP: Primary | ICD-10-CM

## 2024-01-22 PROCEDURE — 99213 OFFICE O/P EST LOW 20 MIN: CPT | Performed by: FAMILY MEDICINE

## 2024-01-22 NOTE — PROGRESS NOTES
[facility] CLINICS AND SURGERY CENTER  SPORTS & ORTHOPEDIC CLINIC VISIT     Jan 22, 2024        ASSESSMENT & PLAN    38-year-old with chronic intermittent left lateral hip pain, occasional symptoms on the right,.  Hip suspected gluteal etiology in the past, possibly piriformis.    Reviewed imaging and assessment with patient in detail  At this time given the refractory nature of this pain to time, activity modification, physical therapy we will pursue an MRI for further evaluation of the muscular tissue.    Miguel Lange MD  [facility] SPORTS MEDICINE CLINIC [place]    -----  Chief Complaint   Patient presents with    Left Hip - Follow Up       SUBJECTIVE  Marjorie Lee is a/an 38 year old female who is seen for follow up of left hip pain. Most pain is lateral side of the hip. All activities and non activities bother her about 1 time a month.     The patient is seen by themselves.    Date of injury: May 2022  Date of Last Visit: 5/19/22   Symptoms: NA  Worsened by: Aqua jogging, biking, standing 1 legged  Better with: Rest, ice   Treatment to date: Rest, avoidance  Associated symptoms: no distal numbness or tingling; denies swelling or warmth        REVIEW OF SYSTEMS:  See HPI     OBJECTIVE:  LMP 12/25/2023 (Approximate)      Exam:  Patient is alert, in no acute distress, pleasant and conversational.    Gait: Nonantalgic.  Normal heel toe gait      left Hip:  Supine PROM:  Flexion: Approximately 115 , no tenderness.  External rotation: approximately 60 , no tenderness.  Internal rotation: Approximately 30 , no tenderness  No subjective pain reported with range of motion testing.     Strength Testing:  Hip flexion: 5/5.  Hip adduction: 5/5.  Hip abduction: 4+/5.  No subjective pain reported with strength testing.     Palpation:  negative tenderness to palpation over the greater trochanter.    negative tenderness to palpation along the piriformis.  negative tenderness to palpation of the SI  joint    Special Tests:  negative Sofie's test.   negative YARI's test  negative FADIR's test  negative Scour test  negative Reported pain with resisted hip flexion to opposite shoulder     Neurovascularly intact in bilateral lower extremities      RADIOLOGY:    2 view xrays of bilateral hips performed 1/8/2024 and reviewed independently demonstrating no acute fracture or dislocation.  No significant DJD. See EMR for formal radiology report.

## 2024-01-22 NOTE — LETTER
1/22/2024      RE: Marjorie Lee  [address]  [facility] [address]     Dear Colleague,    Thank you for referring your patient, Marjorie Lee, to the [facility] SPORTS MEDICINE [facility]. Please see a copy of my visit note below.      [facility] CLINICS AND SURGERY CENTER  SPORTS & ORTHOPEDIC CLINIC VISIT     Jan 22, 2024        ASSESSMENT & PLAN    38-year-old with chronic intermittent left lateral hip pain, occasional symptoms on the right,.  Hip suspected gluteal etiology in the past, possibly piriformis.    Reviewed imaging and assessment with patient in detail  At this time given the refractory nature of this pain to time, activity modification, physical therapy we will pursue an MRI for further evaluation of the muscular tissue.    Miguel Lange MD  [facility] SPORTS MEDICINE [facility]    -----  Chief Complaint   Patient presents with     Left Hip - Follow Up       SUBJECTIVE  Marjorie Lee is a/an 38 year old female who is seen for follow up of left hip pain. Most pain is lateral side of the hip. All activities and non activities bother her about 1 time a month.     The patient is seen by themselves.    Date of injury: May 2022  Date of Last Visit: 5/19/22   Symptoms: NA  Worsened by: Aqua jogging, biking, standing 1 legged  Better with: Rest, ice   Treatment to date: Rest, avoidance  Associated symptoms: no distal numbness or tingling; denies swelling or warmth        REVIEW OF SYSTEMS:  See HPI     OBJECTIVE:  LMP 12/25/2023 (Approximate)      Exam:  Patient is alert, in no acute distress, pleasant and conversational.    Gait: Nonantalgic.  Normal heel toe gait      left Hip:  Supine PROM:  Flexion: Approximately 115 , no tenderness.  External rotation: approximately 60 , no tenderness.  Internal rotation: Approximately 30 , no tenderness  No subjective pain reported with range of motion testing.     Strength Testing:  Hip flexion: 5/5.  Hip adduction: 5/5.  Hip  abduction: 4+/5.  No subjective pain reported with strength testing.     Palpation:  negative tenderness to palpation over the greater trochanter.    negative tenderness to palpation along the piriformis.  negative tenderness to palpation of the SI joint    Special Tests:  negative Sofie's test.   negative YARI's test  negative FADIR's test  negative Scour test  negative Reported pain with resisted hip flexion to opposite shoulder     Neurovascularly intact in bilateral lower extremities      RADIOLOGY:    2 view xrays of bilateral hips performed 1/8/2024 and reviewed independently demonstrating no acute fracture or dislocation.  No significant DJD. See EMR for formal radiology report.              Again, thank you for allowing me to participate in the care of your patient.      Sincerely,    Miguel Lange MD

## 2024-02-01 ENCOUNTER — HOSPITAL ENCOUNTER (OUTPATIENT)
Dept: MRI IMAGING | Facility: HOSPITAL | Age: 39
Discharge: HOME | End: 2024-02-01
Attending: FAMILY MEDICINE | Admitting: FAMILY MEDICINE
Payer: COMMERCIAL

## 2024-02-01 DIAGNOSIS — M25.552 PAIN IN LEFT HIP: ICD-10-CM

## 2024-02-01 PROCEDURE — 72195 MRI PELVIS W/O DYE: CPT

## 2024-03-11 ENCOUNTER — MYC MEDICAL ADVICE (OUTPATIENT)
Dept: FAMILY MEDICINE | Facility: CLINIC | Age: 39
End: 2024-03-11
Payer: COMMERCIAL

## 2024-03-11 ENCOUNTER — HOSPITAL ENCOUNTER (OUTPATIENT)
Dept: MAMMOGRAPHY | Facility: CLINIC | Age: 39
Discharge: HOME | End: 2024-03-11
Attending: NURSE PRACTITIONER | Admitting: NURSE PRACTITIONER
Payer: COMMERCIAL

## 2024-03-11 DIAGNOSIS — M25.552 HIP PAIN, LEFT: Primary | ICD-10-CM

## 2024-03-11 DIAGNOSIS — Z12.31 VISIT FOR SCREENING MAMMOGRAM: ICD-10-CM

## 2024-03-11 PROCEDURE — 77063 BREAST TOMOSYNTHESIS BI: CPT

## 2024-07-19 ENCOUNTER — E-VISIT (OUTPATIENT)
Dept: FAMILY MEDICINE | Facility: CLINIC | Age: 39
End: 2024-07-19
Payer: COMMERCIAL

## 2024-07-19 DIAGNOSIS — H60.502 ACUTE OTITIS EXTERNA OF LEFT EAR, UNSPECIFIED TYPE: Primary | ICD-10-CM

## 2024-07-19 PROCEDURE — 99421 OL DIG E/M SVC 5-10 MIN: CPT | Performed by: NURSE PRACTITIONER

## 2024-07-19 RX ORDER — NEOMYCIN SULFATE, POLYMYXIN B SULFATE AND HYDROCORTISONE 10; 3.5; 1 MG/ML; MG/ML; [USP'U]/ML
3 SUSPENSION/ DROPS AURICULAR (OTIC) 3 TIMES DAILY
Qty: 10 ML | Refills: 0 | Status: SHIPPED | OUTPATIENT
Start: 2024-07-19 | End: 2024-07-26

## 2024-11-10 SDOH — HEALTH STABILITY: PHYSICAL HEALTH: ON AVERAGE, HOW MANY DAYS PER WEEK DO YOU ENGAGE IN MODERATE TO STRENUOUS EXERCISE (LIKE A BRISK WALK)?: 6 DAYS

## 2024-11-10 SDOH — HEALTH STABILITY: PHYSICAL HEALTH: ON AVERAGE, HOW MANY MINUTES DO YOU ENGAGE IN EXERCISE AT THIS LEVEL?: 60 MIN

## 2024-11-10 ASSESSMENT — SOCIAL DETERMINANTS OF HEALTH (SDOH): HOW OFTEN DO YOU GET TOGETHER WITH FRIENDS OR RELATIVES?: TWICE A WEEK

## 2024-11-15 ENCOUNTER — OFFICE VISIT (OUTPATIENT)
Dept: FAMILY MEDICINE | Facility: CLINIC | Age: 39
End: 2024-11-15
Attending: NURSE PRACTITIONER
Payer: COMMERCIAL

## 2024-11-15 VITALS
SYSTOLIC BLOOD PRESSURE: 127 MMHG | HEIGHT: 64 IN | OXYGEN SATURATION: 97 % | RESPIRATION RATE: 14 BRPM | BODY MASS INDEX: 22.2 KG/M2 | WEIGHT: 130 LBS | TEMPERATURE: 97.7 F | HEART RATE: 60 BPM | DIASTOLIC BLOOD PRESSURE: 79 MMHG

## 2024-11-15 DIAGNOSIS — Z13.21 ENCOUNTER FOR VITAMIN DEFICIENCY SCREENING: ICD-10-CM

## 2024-11-15 DIAGNOSIS — Z00.00 ROUTINE GENERAL MEDICAL EXAMINATION AT A HEALTH CARE FACILITY: Primary | ICD-10-CM

## 2024-11-15 DIAGNOSIS — Z13.6 CARDIOVASCULAR SCREENING; LDL GOAL LESS THAN 160: ICD-10-CM

## 2024-11-15 DIAGNOSIS — Z13.29 SCREENING FOR THYROID DISORDER: ICD-10-CM

## 2024-11-15 DIAGNOSIS — Z13.0 SCREENING FOR DISORDER OF BLOOD AND BLOOD-FORMING ORGANS: ICD-10-CM

## 2024-11-15 DIAGNOSIS — Z13.1 SCREENING FOR DIABETES MELLITUS (DM): ICD-10-CM

## 2024-11-15 DIAGNOSIS — Z12.31 ENCOUNTER FOR SCREENING MAMMOGRAM FOR BREAST CANCER: ICD-10-CM

## 2024-11-15 DIAGNOSIS — Z01.84 IMMUNITY STATUS TESTING: ICD-10-CM

## 2024-11-15 ASSESSMENT — PAIN SCALES - GENERAL: PAINLEVEL_OUTOF10: NO PAIN (0)

## 2024-11-15 NOTE — PROGRESS NOTES
Preventive Care Visit  [facility]  Holley MARCY Irby CNP, Family Medicine  Nov 15, 2024  {Provider  Link to [facility] :[id number]}    Assessment & Plan     Routine general medical examination at a health care facility  ***  - REVIEW OF HEALTH MAINTENANCE PROTOCOL ORDERS    CARDIOVASCULAR SCREENING; LDL GOAL LESS THAN 160  ***  - Lipid panel reflex to direct LDL Fasting    Screening for diabetes mellitus (DM)  ***  - Comprehensive metabolic panel    Screening for disorder of blood and blood-forming organs  ***  - CBC with platelets  - Ferritin    Screening for thyroid disorder  ***  - TSH with free T4 reflex    Encounter for screening mammogram for breast cancer  ***  - MA Screen Bilateral w/William    Encounter for vitamin deficiency screening  ***  - Vitamin D Deficiency      Patient has been advised of split billing requirements and indicates understanding: Yes        Counseling  Appropriate preventive services were addressed with this patient via screening, questionnaire, or discussion as appropriate for fall prevention, nutrition, physical activity, Tobacco-use cessation, social engagement, weight loss and cognition.  Checklist reviewing preventive services available has been given to the patient.  Reviewed patient's diet, addressing concerns and/or questions.   She is at risk for psychosocial distress and has been provided with information to reduce risk.       FUTURE APPOINTMENTS:       - Follow-up for annual visit or as needed  See Patient Instructions    Saeed Amador is a 39 year old, presenting for the following:  Physical        11/15/2024    11:31 AM   Additional Questions   Roomed by Kev   Accompanied by self         11/15/2024    11:31 AM   Patient Reported Additional Medications   Patient reports taking the following new medications no          HPI    Health Care Directive  Patient does not have a Health Care Directive: Patient states has Advance Directive and will  bring in a copy to clinic.      11/10/2024   General Health   How would you rate your overall physical health? Good   Feel stress (tense, anxious, or unable to sleep) To some extent      (!) STRESS CONCERN      11/10/2024   Nutrition   Three or more servings of calcium each day? Yes   Diet: Regular (no restrictions)   How many servings of fruit and vegetables per day? (!) 2-3   How many sweetened beverages each day? 0-1            11/10/2024   Exercise   Days per week of moderate/strenous exercise 6 days   Average minutes spent exercising at this level 60 min            11/10/2024   Social Factors   Frequency of gathering with friends or relatives Twice a week   Worry food won't last until get money to buy more No   Food not last or not have enough money for food? No   Do you have housing? (Housing is defined as stable permanent housing and does not include staying ouside in a car, in a tent, in an abandoned building, in an overnight shelter, or couch-surfing.) Yes   Are you worried about losing your housing? No   Lack of transportation? No   Unable to get utilities (heat,electricity)? No            11/10/2024   Dental   Dentist two times every year? Yes            11/10/2024   TB Screening   Were you born outside of the [place]? No        Today's PHQ-2 Score:       1/8/2024     9:32 AM   PHQ-2 ( 1999 [employer])   Q1: Little interest or pleasure in doing things 0    Q2: Feeling down, depressed or hopeless 1    PHQ-2 Score 1   Q1: Little interest or pleasure in doing things Not at all   Q2: Feeling down, depressed or hopeless Several days   PHQ-2 Score 1       Patient-reported         11/10/2024   Substance Use   Alcohol more than 3/day or more than 7/wk No   Do you use any other substances recreationally? No        Social History     Tobacco Use    Smoking status: Never     Passive exposure: Never    Smokeless tobacco: Never   Vaping Use    Vaping status: Never Used   Substance Use Topics    Alcohol use: Yes     Comment:  occ    Drug use: No           3/11/2024   LAST FHS-7 RESULTS   1st degree relative breast or ovarian cancer No   Any relative bilateral breast cancer No   Any male have breast cancer No   Any ONE woman have BOTH breast AND ovarian cancer No   Any woman with breast cancer before 50yrs No   2 or more relatives with breast AND/OR ovarian cancer No   2 or more relatives with breast AND/OR bowel cancer --           Mammogram Screening - Mammogram every 1-2 years updated in Health Maintenance based on mutual decision making        11/10/2024   STI Screening   New sexual partner(s) since last STI/HIV test? No        History of abnormal Pap smear: No        Latest Ref Rng & Units 11/7/2022    10:17 AM 11/1/2017    12:00 AM   PAP / HPV   PAP  Negative for Intraepithelial Lesion or Malignancy (NILM)     HPV 16 DNA Negative Negative     HPV 18 DNA Negative Negative     Other HR HPV Negative Negative     PAP-ABSTRACT   See Scanned Document           This result is from an external source.     ASCVD Risk   The 10-year ASCVD risk score (Rhina ESCOBEDO, et al., 2019) is: 0.4%    Values used to calculate the score:      Age: 39 years      Sex: Female      Is Non- : No      Diabetic: No      Tobacco smoker: No      Systolic Blood Pressure: 158 mmHg      Is BP treated: No      HDL Cholesterol: 69 mg/dL      Total Cholesterol: 167 mg/dL        11/10/2024   Contraception/Family Planning   Questions about contraception or family planning No           Reviewed and updated as needed this visit by Provider                    Past Medical History:   Diagnosis Date    Ischemic colitis (H) 11/07/2022    From running    NO ACTIVE PROBLEMS      Past Surgical History:   Procedure Laterality Date    COLONOSCOPY N/A 09/03/2019    Procedure: Colonoscopy, With Polypectomy And Biopsy;  Surgeon: Radha Neri DO;  Location: [facility] OR    COLONOSCOPY N/A 03/05/2020    Procedure: Colonoscopy, With Polypectomy And Biopsy;   Surgeon: Radha Neri DO;  Location: [facility] OR    COLONOSCOPY WITH CO2 INSUFFLATION N/A 09/03/2019    Procedure: COLONOSCOPY, WITH CO2 INSUFFLATION;  Surgeon: Radha Neri DO;  Location: [facility] OR    COLONOSCOPY WITH CO2 INSUFFLATION N/A 03/05/2020    Procedure: COLONOSCOPY, WITH CO2 INSUFFLATION;  Surgeon: Radha Neri DO;  Location: [facility] OR    EYE SURGERY      chalazion removal     Lab work is in process  Labs reviewed in EPIC  BP Readings from Last 3 Encounters:   11/15/24 127/79   01/08/24 136/80   11/13/23 117/70    Wt Readings from Last 3 Encounters:   11/15/24 59 kg (130 lb)   01/08/24 59.7 kg (131 lb 9.6 oz)   11/13/23 57.7 kg (127 lb 4.8 oz)                  Patient Active Problem List   Diagnosis    IUD (intrauterine device) in place    Abnormal colonoscopy    Rectal bleeding    Piriformis syndrome, right    Urge incontinence of urine    Somatic dysfunction of pelvis region    History of ischemic colitis     Past Surgical History:   Procedure Laterality Date    COLONOSCOPY N/A 09/03/2019    Procedure: Colonoscopy, With Polypectomy And Biopsy;  Surgeon: Radha Neri DO;  Location: [facility] OR    COLONOSCOPY N/A 03/05/2020    Procedure: Colonoscopy, With Polypectomy And Biopsy;  Surgeon: Radha Neri DO;  Location: [facility]    COLONOSCOPY WITH CO2 INSUFFLATION N/A 09/03/2019    Procedure: COLONOSCOPY, WITH CO2 INSUFFLATION;  Surgeon: Radha Neri DO;  Location: [facility]    COLONOSCOPY WITH CO2 INSUFFLATION N/A 03/05/2020    Procedure: COLONOSCOPY, WITH CO2 INSUFFLATION;  Surgeon: Radha Neri DO;  Location: [facility] OR    EYE SURGERY      chalazion removal       Social History     Tobacco Use    Smoking status: Never     Passive exposure: Never    Smokeless tobacco: Never   Substance Use Topics    Alcohol use: Yes     Comment: occ     Family History   Problem Relation Age of Onset    Osteoarthritis Mother     Hypertension Mother     Hyperlipidemia Mother     Osteoarthritis Father     Hypertension  Father     Breast Cancer Maternal Aunt     Other - See Comments Paternal Uncle         glioblastoma    Other - See Comments Paternal Uncle         glioblastoma    Colon Cancer Maternal Grandmother     Diabetes Maternal Grandmother     Obesity Maternal Grandmother     Colon Cancer Paternal Grandmother     Diabetes Other         maternal uncle    Coronary Artery Disease Other         maternal uncle    Obesity Other     Diabetes Other         maternal aunt    Breast Cancer Other     Substance Abuse Brother         alcohol abuse    Asthma Niece     Asthma Sister          Current Outpatient Medications   Medication Sig Dispense Refill    cyclobenzaprine (FLEXERIL) 5 MG tablet Take 1-2 tablets (5-10 mg) by mouth 3 times daily as needed for muscle spasms 30 tablet 3    levonorgestrel (MIRENA) 20 MCG/24HR IUD 1 each (20 mcg) by Intrauterine route continuous      Multiple Vitamins-Minerals (MULTIVITAMIN PO) Take 1 tablet by mouth daily       triamcinolone (KENALOG) 0.5 % external ointment Apply in thin layer to affected areas twice a day.  Use for no longer than two weeks at a time. Patient will call when needed 80 g 1     Allergies   Allergen Reactions    No Known Allergies        CONSTITUTIONAL:NEGATIVE for fever, chills, change in weight  INTEGUMENTARY/SKIN: NEGATIVE for worrisome rashes, moles or lesions  EYES: NEGATIVE for vision changes or irritation  ENT: NEGATIVE for ear, mouth and throat problems  RESP:NEGATIVE for significant cough or SOB  BREAST: NEGATIVE for masses, tenderness or discharge  CV: NEGATIVE for chest pain, palpitations or peripheral edema  GI: NEGATIVE for nausea, abdominal pain, heartburn, or change in bowel habits   female: no unusual urinary symptoms, no unusual vaginal symptoms, and menses: occasional menses  MUSCULOSKELETAL:NEGATIVE for significant arthralgias or myalgia  NEURO: NEGATIVE for weakness, dizziness or paresthesias  ENDOCRINE: NEGATIVE for temperature intolerance, skin/hair  "changes  HEME/ALLERGY/IMMUNE: NEGATIVE for bleeding problems  PSYCHIATRIC: NEGATIVE for changes in mood or affect            Objective    Exam  /79 (BP Location: Right arm, Patient Position: Sitting, Cuff Size: Adult Regular)   Pulse 60   Temp 97.7  F (36.5  C) (Temporal)   Resp 14   Ht 1.613 m (5' 3.5\")   Wt 59 kg (130 lb)   SpO2 97%   BMI 22.67 kg/m     Estimated body mass index is 22.67 kg/m  as calculated from the following:    Height as of this encounter: 1.613 m (5' 3.5\").    Weight as of this encounter: 59 kg (130 lb). Second BP: 127/79   BP Readings from Last 6 Encounters:   11/15/24 127/79   01/08/24 136/80   11/13/23 117/70   07/17/23 121/79   12/02/22 115/71   11/07/22 138/81      Physical Exam  {FEMALE - complete :[id number]}        Signed Electronically by: MARCY Hinojosa CNP  {Email feedback regarding this note to [email or web address]   :[id number]}  "

## 2024-11-15 NOTE — PATIENT INSTRUCTIONS
PLAN:   1.   Symptomatic therapy suggested: Increase calcium to 1000mg and 1000 international unit(s) Vit D .  2.  Orders Placed This Encounter   Procedures    REVIEW OF HEALTH MAINTENANCE PROTOCOL ORDERS    MA Screen Bilateral w/William    Lipid panel reflex to direct LDL Fasting    CBC with platelets    Comprehensive metabolic panel    TSH with free T4 reflex    Ferritin    Vitamin D Deficiency    Hepatitis B Surface Antibody    HEPATITIS B CORE ANTIBODY    HEPATITIS B SURFACE ANTIGEN       3.  FURTHER TESTING:       - mammogram  FUTURE LABS:       - Schedule a fasting blood draw   Will follow up and/or notify patient of  results via My Chart to determine further need for followup   Follow up office visit in one year for annual health maintenance exam, sooner PRN.   Patient needs to follow up in if no improvement,or sooner if worsening of symptoms or other symptoms develop.          Patient Education   Preventive Care Advice   This is general advice given by our system to help you stay healthy. However, your care team may have specific advice just for you. Please talk to your care team about your preventive care needs.  Nutrition  Eat 5 or more servings of fruits and vegetables each day.  Try wheat bread, brown rice and whole grain pasta (instead of white bread, rice, and pasta).  Get enough calcium and vitamin D. Check the label on foods and aim for 100% of the RDA (recommended daily allowance).  Lifestyle  Exercise at least 150 minutes each week  (30 minutes a day, 5 days a week).  Do muscle strengthening activities 2 days a week. These help control your weight and prevent disease.  No smoking.  Wear sunscreen to prevent skin cancer.  Have a dental exam and cleaning every 6 months.  Yearly exams  See your health care team every year to talk about:  Any changes in your health.  Any medicines your care team has prescribed.  Preventive care, family planning, and ways to prevent chronic diseases.  Shots (vaccines)    HPV shots (up to age 26), if you've never had them before.  Hepatitis B shots (up to age 59), if you've never had them before.  COVID-19 shot: Get this shot when it's due.  Flu shot: Get a flu shot every year.  Tetanus shot: Get a tetanus shot every 10 years.  Pneumococcal, hepatitis A, and RSV shots: Ask your care team if you need these based on your risk.  Shingles shot (for age 50 and up)  General health tests  Diabetes screening:  Starting at age 35, Get screened for diabetes at least every 3 years.  If you are younger than age 35, ask your care team if you should be screened for diabetes.  Cholesterol test: At age 39, start having a cholesterol test every 5 years, or more often if advised.  Bone density scan (DEXA): At age 50, ask your care team if you should have this scan for osteoporosis (brittle bones).  Hepatitis C: Get tested at least once in your life.  STIs (sexually transmitted infections)  Before age 24: Ask your care team if you should be screened for STIs.  After age 24: Get screened for STIs if you're at risk. You are at risk for STIs (including HIV) if:  You are sexually active with more than one person.  You don't use condoms every time.  You or a partner was diagnosed with a sexually transmitted infection.  If you are at risk for HIV, ask about PrEP medicine to prevent HIV.  Get tested for HIV at least once in your life, whether you are at risk for HIV or not.  Cancer screening tests  Cervical cancer screening: If you have a cervix, begin getting regular cervical cancer screening tests starting at age 21.  Breast cancer scan (mammogram): If you've ever had breasts, begin having regular mammograms starting at age 40. This is a scan to check for breast cancer.  Colon cancer screening: It is important to start screening for colon cancer at age 45.  Have a colonoscopy test every 10 years (or more often if you're at risk) Or, ask your provider about stool tests like a FIT test every year or  Cologuard test every 3 years.  To learn more about your testing options, visit:   .  For help making a decision, visit:   https://bit.ly/pp51883.  Prostate cancer screening test: If you have a prostate, ask your care team if a prostate cancer screening test (PSA) at age 55 is right for you.  Lung cancer screening: If you are a current or former smoker ages 50 to 80, ask your care team if ongoing lung cancer screenings are right for you.  For informational purposes only. Not to replace the advice of your health care provider. Copyright   2023 [employer]. All rights reserved. Clinically reviewed by the [facility] Transitions Program. [employer] 156130 - REV 01/24.  Learning About Stress  What is stress?     Stress is your body's response to a hard situation. Your body can have a physical, emotional, or mental response. Stress is a fact of life for most people, and it affects everyone differently. What causes stress for you may not be stressful for someone else.  A lot of things can cause stress. You may feel stress when you go on a job interview, take a test, or run a race. This kind of short-term stress is normal and even useful. It can help you if you need to work hard or react quickly. For example, stress can help you finish an important job on time.  Long-term stress is caused by ongoing stressful situations or events. Examples of long-term stress include long-term health problems, ongoing problems at work, or conflicts in your family. Long-term stress can harm your health.  How does stress affect your health?  When you are stressed, your body responds as though you are in danger. It makes hormones that speed up your heart, make you breathe faster, and give you a burst of energy. This is called the fight-or-flight stress response. If the stress is over quickly, your body goes back to normal and no harm is done.  But if stress happens too often or lasts too long, it can have bad effects.  Long-term stress can make you more likely to get sick, and it can make symptoms of some diseases worse. If you tense up when you are stressed, you may develop neck, shoulder, or low back pain. Stress is linked to high blood pressure and heart disease.  Stress also harms your emotional health. It can make you israel, tense, or depressed. Your relationships may suffer, and you may not do well at work or school.  What can you do to manage stress?  You can try these things to help manage stress:   Do something active. Exercise or activity can help reduce stress. Walking is a great way to get started. Even everyday activities such as housecleaning or yard work can help.  Try yoga or cesar chi. These techniques combine exercise and meditation. You may need some training at first to learn them.  Do something you enjoy. For example, listen to music or go to a movie. Practice your hobby or do volunteer work.  Meditate. This can help you relax, because you are not worrying about what happened before or what may happen in the future.  Do guided imagery. Imagine yourself in any setting that helps you feel calm. You can use online videos, books, or a teacher to guide you.  Do breathing exercises. For example:  From a standing position, bend forward from the waist with your knees slightly bent. Let your arms dangle close to the floor.  Breathe in slowly and deeply as you return to a standing position. Roll up slowly and lift your head last.  Hold your breath for just a few seconds in the standing position.  Breathe out slowly and bend forward from the waist.  Let your feelings out. Talk, laugh, cry, and express anger when you need to. Talking with supportive friends or family, a counselor, or a ze leader about your feelings is a healthy way to relieve stress. Avoid discussing your feelings with people who make you feel worse.  Write. It may help to write about things that are bothering you. This helps you find out how much stress  "you feel and what is causing it. When you know this, you can find better ways to cope.  What can you do to prevent stress?  You might try some of these things to help prevent stress:  Manage your time. This helps you find time to do the things you want and need to do.  Get enough sleep. Your body recovers from the stresses of the day while you are sleeping.  Get support. Your family, friends, and community can make a difference in how you experience stress.  Limit your news feed. Avoid or limit time on social media or news that may make you feel stressed.  Do something active. Exercise or activity can help reduce stress. Walking is a great way to get started.  Where can you learn more?  Go to https://www.[employer].net/patiented  Enter N032 in the search box to learn more about \"Learning About Stress.\"  Current as of: October 24, 2023  Content Version: 14.2 2024 [email or web address].   Care instructions adapted under license by your healthcare professional. If you have questions about a medical condition or this instruction, always ask your healthcare professional. [employer] disclaims any warranty or liability for your use of this information.       "

## 2024-11-16 ENCOUNTER — LAB (OUTPATIENT)
Dept: LAB | Facility: CLINIC | Age: 39
End: 2024-11-16
Payer: COMMERCIAL

## 2024-11-16 DIAGNOSIS — Z13.29 SCREENING FOR THYROID DISORDER: ICD-10-CM

## 2024-11-16 DIAGNOSIS — Z13.21 ENCOUNTER FOR VITAMIN DEFICIENCY SCREENING: ICD-10-CM

## 2024-11-16 DIAGNOSIS — Z13.0 SCREENING FOR DISORDER OF BLOOD AND BLOOD-FORMING ORGANS: ICD-10-CM

## 2024-11-16 DIAGNOSIS — Z13.6 CARDIOVASCULAR SCREENING; LDL GOAL LESS THAN 160: ICD-10-CM

## 2024-11-16 DIAGNOSIS — Z13.1 SCREENING FOR DIABETES MELLITUS (DM): ICD-10-CM

## 2024-11-16 LAB
ALBUMIN SERPL BCG-MCNC: 4.4 G/DL (ref 3.5–5.2)
ALP SERPL-CCNC: 64 U/L (ref 40–150)
ALT SERPL W P-5'-P-CCNC: 22 U/L (ref 0–50)
ANION GAP SERPL CALCULATED.3IONS-SCNC: 10 MMOL/L (ref 7–15)
AST SERPL W P-5'-P-CCNC: 27 U/L (ref 0–45)
BILIRUB SERPL-MCNC: 0.5 MG/DL
BUN SERPL-MCNC: 15 MG/DL (ref 6–20)
CALCIUM SERPL-MCNC: 9.1 MG/DL (ref 8.8–10.4)
CHLORIDE SERPL-SCNC: 103 MMOL/L (ref 98–107)
CHOLEST SERPL-MCNC: 181 MG/DL
CREAT SERPL-MCNC: 0.75 MG/DL (ref 0.51–0.95)
EGFRCR SERPLBLD CKD-EPI 2021: >90 ML/MIN/1.73M2
ERYTHROCYTE [DISTWIDTH] IN BLOOD BY AUTOMATED COUNT: 12.5 % (ref 10–15)
FASTING STATUS PATIENT QL REPORTED: NORMAL
FASTING STATUS PATIENT QL REPORTED: NORMAL
FERRITIN SERPL-MCNC: 36 NG/ML (ref 6–175)
GLUCOSE SERPL-MCNC: 88 MG/DL (ref 70–99)
HBV CORE AB SERPL QL IA: NONREACTIVE
HBV SURFACE AB SERPL IA-ACNC: >1000 M[IU]/ML
HBV SURFACE AB SERPL IA-ACNC: REACTIVE M[IU]/ML
HBV SURFACE AG SERPL QL IA: NONREACTIVE
HCO3 SERPL-SCNC: 26 MMOL/L (ref 22–29)
HCT VFR BLD AUTO: 40.4 % (ref 35–47)
HDLC SERPL-MCNC: 76 MG/DL
HGB BLD-MCNC: 13.6 G/DL (ref 11.7–15.7)
LDLC SERPL CALC-MCNC: 96 MG/DL
MCH RBC QN AUTO: 31 PG (ref 26.5–33)
MCHC RBC AUTO-ENTMCNC: 33.7 G/DL (ref 31.5–36.5)
MCV RBC AUTO: 92 FL (ref 78–100)
NONHDLC SERPL-MCNC: 105 MG/DL
PLATELET # BLD AUTO: 213 10E3/UL (ref 150–450)
POTASSIUM SERPL-SCNC: 4 MMOL/L (ref 3.4–5.3)
PROT SERPL-MCNC: 7.7 G/DL (ref 6.4–8.3)
RBC # BLD AUTO: 4.39 10E6/UL (ref 3.8–5.2)
SODIUM SERPL-SCNC: 139 MMOL/L (ref 135–145)
TRIGL SERPL-MCNC: 45 MG/DL
TSH SERPL DL<=0.005 MIU/L-ACNC: 4.16 UIU/ML (ref 0.3–4.2)
VIT D+METAB SERPL-MCNC: 39 NG/ML (ref 20–50)
WBC # BLD AUTO: 5 10E3/UL (ref 4–11)

## 2024-11-16 PROCEDURE — 36415 COLL VENOUS BLD VENIPUNCTURE: CPT | Performed by: NURSE PRACTITIONER

## 2024-11-16 PROCEDURE — 82728 ASSAY OF FERRITIN: CPT | Performed by: PATHOLOGY

## 2024-11-16 PROCEDURE — 84443 ASSAY THYROID STIM HORMONE: CPT | Performed by: PATHOLOGY

## 2024-11-16 PROCEDURE — 86704 HEP B CORE ANTIBODY TOTAL: CPT | Performed by: NURSE PRACTITIONER

## 2024-11-16 PROCEDURE — 99000 SPECIMEN HANDLING OFFICE-LAB: CPT | Performed by: PATHOLOGY

## 2024-11-16 PROCEDURE — 86706 HEP B SURFACE ANTIBODY: CPT | Performed by: NURSE PRACTITIONER

## 2024-11-16 PROCEDURE — 80061 LIPID PANEL: CPT | Performed by: PATHOLOGY

## 2024-11-16 PROCEDURE — 82306 VITAMIN D 25 HYDROXY: CPT | Performed by: NURSE PRACTITIONER

## 2024-11-16 PROCEDURE — 80053 COMPREHEN METABOLIC PANEL: CPT | Performed by: PATHOLOGY

## 2024-11-16 PROCEDURE — 87340 HEPATITIS B SURFACE AG IA: CPT | Performed by: NURSE PRACTITIONER

## 2024-11-16 PROCEDURE — 85027 COMPLETE CBC AUTOMATED: CPT | Performed by: PATHOLOGY

## 2024-11-18 ENCOUNTER — PATIENT OUTREACH (OUTPATIENT)
Dept: CARE COORDINATION | Facility: CLINIC | Age: 39
End: 2024-11-18

## 2024-11-18 NOTE — RESULT ENCOUNTER NOTE
Neena Lee,    Attached are your test results.  You have immunity to Hepatitis B it appears from immunization    Please contact us if you have any questions.    Holley Noe, CNP

## 2024-11-18 NOTE — RESULT ENCOUNTER NOTE
Neena Lee,    Attached are your test results.  -Normal red blood cell (hgb) levels, normal white blood cell count and normal platelet levels.  -Cholesterol levels (LDL,HDL, Triglycerides) are normal.  ADVISE: rechecking in 1 year.   -Liver and gallbladder tests are normal (ALT,AST, Alk phos, bilirubin), kidney function is normal (Cr, GFR), sodium is normal, potassium is normal, calcium is normal, glucose is normal.  -TSH (thyroid stimulating hormone) level is normal which indicates normal thyroid function.  -Vitamin D level is normal and getting 1000 IU daily in your diet or supplements is recommended.   -Ferritin (iron) level is normal.   Please contact us if you have any questions.    Holley Noe, CNP

## 2024-12-04 ENCOUNTER — TELEPHONE (OUTPATIENT)
Dept: GASTROENTEROLOGY | Facility: CLINIC | Age: 39
End: 2024-12-04
Payer: COMMERCIAL

## 2024-12-04 NOTE — TELEPHONE ENCOUNTER
"Endoscopy Scheduling Screen    Have you had any respiratory illness or flu-like symptoms in the last 10 days?  No    What is your communication preference for Instructions and/or Bowel Prep?   MyChart    What insurance is in the chart?  Other:  [facility] [employer]    Ordering/Referring Provider: BINDU PATTON    (If ordering provider performs procedure, schedule with ordering provider unless otherwise instructed. )    BMI: Estimated body mass index is 22.67 kg/m  as calculated from the following:    Height as of 11/15/24: 1.613 m (5' 3.5\").    Weight as of 11/15/24: 59 kg (130 lb).     Sedation Ordered  moderate sedation.   If patient BMI > 50 do not schedule in [facility].    If patient BMI > 45 [facility]    Are you taking methadone or Suboxone?  NO, No RN review required.    Have you been diagnosed and are being treated for severe PTSD or severe anxiety?  NO, No RN review required.    Are you taking any prescription medications for pain 3 or more times per week?   NO, No RN review required.    Do you have a history of malignant hyperthermia?  No    (Females) Are you currently pregnant?   No     Have you been diagnosed or told you have pulmonary hypertension?   No    Do you have an LVAD?  No    Have you been told you have moderate to severe sleep apnea?  No.    Have you been told you have COPD, asthma, or any other lung disease?  No    Do you have any heart conditions?  No     Have you ever had or are you waiting for an organ transplant?  No. Continue scheduling, no site restrictions.    Have you had a stroke or transient ischemic attack (TIA aka \"mini stroke\" in the last 6 months?   No    Have you been diagnosed with or been told you have cirrhosis of the liver?   No.    Are you currently on dialysis?   No    Do you need assistance transferring?   No    BMI: Estimated body mass index is 22.67 kg/m  as calculated from the following:    Height as of 11/15/24: 1.613 m (5' 3.5\").    Weight as of 11/15/24: 59 kg " (130 lb).     Is patients BMI > 40 and scheduling location [facility]?  No    Do you take an injectable or oral medication for weight loss or diabetes (excluding insulin)?  No    Do you take the medication Naltrexone?  No    Do you take blood thinners?  No       Prep   Are you currently on dialysis or do you have chronic kidney disease?  No    Do you have a diagnosis of diabetes?  No    Do you have a diagnosis of cystic fibrosis (CF)?  No    On a regular basis do you go 3 -5 days between bowel movements?  No    BMI > 40?  No    Preferred Pharmacy:    [id number] - [place], [place] - [address] AT [facility] [address]  [address]  [facility] [address]  Phone: [phone] Fax: [phone]        Final Scheduling Details     Procedure scheduled  Colonoscopy    Surgeon:  LYNNE     Date of procedure:  3/14/25     Pre-OP / PAC:   No - Not required for this site.    Location  [facility] - [facility] - Patient preference.    Sedation   Moderate Sedation - Per order.      Patient Reminders:   You will receive a call from a Nurse to review instructions and health history.  This assessment must be completed prior to your procedure.  Failure to complete the Nurse assessment may result in the procedure being cancelled.      On the day of your procedure, please designate an adult(s) who can drive you home stay with you for the next 24 hours. The medicines used in the exam will make you sleepy. You will not be able to drive.      You cannot take public transportation, ride share services, or non-medical taxi service without a responsible caregiver.  Medical transport services are allowed with the requirement that a responsible caregiver will receive you at your destination.  We require that drivers and caregivers are confirmed prior to your procedure.

## 2025-03-28 ENCOUNTER — E-VISIT (OUTPATIENT)
Dept: FAMILY MEDICINE | Facility: CLINIC | Age: 40
End: 2025-03-28
Payer: COMMERCIAL

## 2025-03-28 DIAGNOSIS — M62.838 SPASM OF MUSCLE: ICD-10-CM

## 2025-03-28 DIAGNOSIS — Z13.29 SCREENING FOR THYROID DISORDER: ICD-10-CM

## 2025-03-28 DIAGNOSIS — Z13.21 ENCOUNTER FOR VITAMIN DEFICIENCY SCREENING: ICD-10-CM

## 2025-03-28 DIAGNOSIS — Z13.0 SCREENING FOR DISORDER OF BLOOD AND BLOOD-FORMING ORGANS: Primary | ICD-10-CM

## 2025-03-28 DIAGNOSIS — M25.552 BILATERAL HIP PAIN: ICD-10-CM

## 2025-03-28 DIAGNOSIS — M25.551 BILATERAL HIP PAIN: ICD-10-CM

## 2025-03-28 RX ORDER — METHOCARBAMOL 500 MG/1
500 TABLET, FILM COATED ORAL 3 TIMES DAILY PRN
Qty: 30 TABLET | Refills: 1 | Status: SHIPPED | OUTPATIENT
Start: 2025-03-28

## 2025-03-29 ENCOUNTER — LAB (OUTPATIENT)
Dept: LAB | Facility: CLINIC | Age: 40
End: 2025-03-29
Payer: COMMERCIAL

## 2025-03-29 DIAGNOSIS — Z13.0 SCREENING FOR DISORDER OF BLOOD AND BLOOD-FORMING ORGANS: ICD-10-CM

## 2025-03-29 DIAGNOSIS — Z13.21 ENCOUNTER FOR VITAMIN DEFICIENCY SCREENING: ICD-10-CM

## 2025-03-29 DIAGNOSIS — Z13.29 SCREENING FOR THYROID DISORDER: ICD-10-CM

## 2025-03-29 LAB
ERYTHROCYTE [DISTWIDTH] IN BLOOD BY AUTOMATED COUNT: 12.3 % (ref 10–15)
FERRITIN SERPL-MCNC: 85 NG/ML (ref 6–175)
HCT VFR BLD AUTO: 39.8 % (ref 35–47)
HGB BLD-MCNC: 13.5 G/DL (ref 11.7–15.7)
IRON BINDING CAPACITY (ROCHE): 221 UG/DL (ref 240–430)
IRON SATN MFR SERPL: 16 % (ref 15–46)
IRON SERPL-MCNC: 35 UG/DL (ref 37–145)
MCH RBC QN AUTO: 30.8 PG (ref 26.5–33)
MCHC RBC AUTO-ENTMCNC: 33.9 G/DL (ref 31.5–36.5)
MCV RBC AUTO: 91 FL (ref 78–100)
PLATELET # BLD AUTO: 164 10E3/UL (ref 150–450)
RBC # BLD AUTO: 4.38 10E6/UL (ref 3.8–5.2)
T4 FREE SERPL-MCNC: 1.02 NG/DL (ref 0.9–1.7)
TSH SERPL DL<=0.005 MIU/L-ACNC: 8.99 UIU/ML (ref 0.3–4.2)
VIT B12 SERPL-MCNC: 927 PG/ML (ref 232–1245)
VIT D+METAB SERPL-MCNC: 75 NG/ML (ref 20–50)
WBC # BLD AUTO: 5.7 10E3/UL (ref 4–11)

## 2025-03-29 PROCEDURE — 82728 ASSAY OF FERRITIN: CPT | Performed by: PATHOLOGY

## 2025-03-29 PROCEDURE — 84443 ASSAY THYROID STIM HORMONE: CPT | Performed by: PATHOLOGY

## 2025-03-29 PROCEDURE — 36415 COLL VENOUS BLD VENIPUNCTURE: CPT | Performed by: PATHOLOGY

## 2025-03-29 PROCEDURE — 83540 ASSAY OF IRON: CPT | Performed by: PATHOLOGY

## 2025-03-29 PROCEDURE — 82306 VITAMIN D 25 HYDROXY: CPT | Performed by: NURSE PRACTITIONER

## 2025-03-29 PROCEDURE — 84439 ASSAY OF FREE THYROXINE: CPT | Performed by: PATHOLOGY

## 2025-03-29 PROCEDURE — 99000 SPECIMEN HANDLING OFFICE-LAB: CPT | Performed by: PATHOLOGY

## 2025-03-29 PROCEDURE — 83550 IRON BINDING TEST: CPT | Performed by: PATHOLOGY

## 2025-03-29 PROCEDURE — 85027 COMPLETE CBC AUTOMATED: CPT | Performed by: PATHOLOGY

## 2025-03-29 PROCEDURE — 82607 VITAMIN B-12: CPT | Performed by: NURSE PRACTITIONER

## 2025-04-30 ENCOUNTER — LAB (OUTPATIENT)
Dept: LAB | Facility: CLINIC | Age: 40
End: 2025-04-30
Payer: COMMERCIAL

## 2025-04-30 DIAGNOSIS — Z13.29 SCREENING FOR THYROID DISORDER: ICD-10-CM

## 2025-04-30 LAB
T4 FREE SERPL-MCNC: 1.15 NG/DL (ref 0.9–1.7)
TSH SERPL DL<=0.005 MIU/L-ACNC: 3.23 UIU/ML (ref 0.3–4.2)

## 2025-04-30 PROCEDURE — 36415 COLL VENOUS BLD VENIPUNCTURE: CPT

## 2025-04-30 PROCEDURE — 84443 ASSAY THYROID STIM HORMONE: CPT

## 2025-04-30 PROCEDURE — 84439 ASSAY OF FREE THYROXINE: CPT

## 2025-04-30 PROCEDURE — 86376 MICROSOMAL ANTIBODY EACH: CPT

## 2025-05-01 LAB — THYROPEROXIDASE AB SERPL-ACNC: 30 IU/ML

## 2025-05-01 NOTE — RESULT ENCOUNTER NOTE
Neena Lee,    Attached are your test results.  -TSH (thyroid stimulating hormone) level is normal which indicates normal thyroid function.   Please contact us if you have any questions.    Holley Noe, CNP

## 2025-05-01 NOTE — RESULT ENCOUNTER NOTE
Neena Lee,    Attached are your test results.  Thyroid antibody is normal    Please contact us if you have any questions.    Holley Noe, CNP

## 2025-07-30 ENCOUNTER — TELEPHONE (OUTPATIENT)
Dept: GASTROENTEROLOGY | Facility: CLINIC | Age: 40
End: 2025-07-30
Payer: COMMERCIAL

## 2025-07-30 NOTE — TELEPHONE ENCOUNTER
Pre visit planning completed.      Procedure details:    Patient scheduled for Colonoscopy on 8/15/25.     Arrival time: 1030. Procedure time 1130    Facility location: [facility]; [address] [place] [address], [address] Floor, [place] [address]. Check in location: 5th Floor.    Sedation type: MAC    Pre op exam needed? No.    Indication for procedure: screening colonoscopy, family history colonic polyps      Chart review:     Electronic implanted devices? No    Recent diagnosis of diverticulitis within the last 6 weeks? No      Medication review:    Diabetic? No    Anticoagulants? No    Weight loss medication/injectable? No GLP-1 medication per patient's medication list. Nursing to verify with pre-assessment call.    Other medication HOLDING recommendations:  Ferrous sulfate (iron supplements): HOLD 7 days before procedure.      Prep for procedure:     Bowel prep recommendation: Standard Miralax.   Due to: standard bowel prep    Procedure information and instructions sent via aide Kelley RN  Endoscopy Procedure Pre Assessment   [phone] option 3

## 2025-07-30 NOTE — TELEPHONE ENCOUNTER
Bowel Prep Review:  Disclaimer: No call was made to the patient.     Standard Miralax bowel prep.    Recommended due to standard bowel prep.   Instructions were sent via aide Morris LPN  Endoscopy Procedure Pre Assessment

## 2025-07-30 NOTE — TELEPHONE ENCOUNTER
Pre assessment completed for upcoming procedure.   (Please see previous telephone encounter notes for complete details)    Patient returned call.       Procedure details:    Procedure date 8/15/25, arrival time 1030 and facility location reviewed.    Pre op exam needed? No.    Designated  policy reviewed. Instructed to have someone stay 24  hours post procedure.       Medication review:    Ferrous Sulfate (iron supplement): HOLD 7 days before procedure.      Prep for procedure:     Procedure prep instructions reviewed.        Any additional information needed:  N/A      Patient verbalized understanding and had no questions or concerns at this time.      Natalya Rock RN  Endoscopy Procedure Pre Assessment   [phone] option 3

## 2025-07-30 NOTE — TELEPHONE ENCOUNTER
Attempted to contact patient in order to complete pre assessment questions.     No answer. Left message to return call to [phone] option 3    Callback communication sent via [employer].      Jenifer Morris LPN  Endoscopy Procedure Pre Assessment

## 2025-08-15 ENCOUNTER — HOSPITAL ENCOUNTER (OUTPATIENT)
Facility: AMBULATORY SURGERY CENTER | Age: 40
Discharge: HOME | End: 2025-08-15
Attending: SURGERY | Admitting: SURGERY
Payer: COMMERCIAL

## 2025-08-15 VITALS
DIASTOLIC BLOOD PRESSURE: 67 MMHG | WEIGHT: 125 LBS | OXYGEN SATURATION: 98 % | TEMPERATURE: 97.2 F | RESPIRATION RATE: 19 BRPM | BODY MASS INDEX: 22.15 KG/M2 | HEIGHT: 63 IN | SYSTOLIC BLOOD PRESSURE: 100 MMHG | HEART RATE: 78 BPM

## 2025-08-15 LAB
COLONOSCOPY: NORMAL
HCG UR QL: NEGATIVE
INTERNAL QC OK POCT: NORMAL
POCT KIT EXPIRATION DATE: NORMAL
POCT KIT LOT NUMBER: NORMAL

## 2025-08-15 PROCEDURE — 81025 URINE PREGNANCY TEST: CPT | Performed by: PATHOLOGY

## 2025-08-15 PROCEDURE — 88305 TISSUE EXAM BY PATHOLOGIST: CPT | Mod: 26 | Performed by: PATHOLOGY

## 2025-08-15 PROCEDURE — 88305 TISSUE EXAM BY PATHOLOGIST: CPT | Mod: TC | Performed by: SURGERY

## 2025-08-15 RX ORDER — NALOXONE HYDROCHLORIDE 0.4 MG/ML
0.2 INJECTION, SOLUTION INTRAMUSCULAR; INTRAVENOUS; SUBCUTANEOUS
Status: DISCONTINUED | OUTPATIENT
Start: 2025-08-15 | End: 2025-08-16 | Stop reason: HOSPADM

## 2025-08-15 RX ORDER — ONDANSETRON 4 MG/1
4 TABLET, ORALLY DISINTEGRATING ORAL EVERY 6 HOURS PRN
Status: DISCONTINUED | OUTPATIENT
Start: 2025-08-15 | End: 2025-08-16 | Stop reason: HOSPADM

## 2025-08-15 RX ORDER — ONDANSETRON 2 MG/ML
4 INJECTION INTRAMUSCULAR; INTRAVENOUS
Status: DISCONTINUED | OUTPATIENT
Start: 2025-08-15 | End: 2025-08-16 | Stop reason: HOSPADM

## 2025-08-15 RX ORDER — PROCHLORPERAZINE MALEATE 10 MG
10 TABLET ORAL EVERY 6 HOURS PRN
Status: DISCONTINUED | OUTPATIENT
Start: 2025-08-15 | End: 2025-08-16 | Stop reason: HOSPADM

## 2025-08-15 RX ORDER — NALOXONE HYDROCHLORIDE 0.4 MG/ML
0.4 INJECTION, SOLUTION INTRAMUSCULAR; INTRAVENOUS; SUBCUTANEOUS
Status: DISCONTINUED | OUTPATIENT
Start: 2025-08-15 | End: 2025-08-16 | Stop reason: HOSPADM

## 2025-08-15 RX ORDER — ONDANSETRON 2 MG/ML
4 INJECTION INTRAMUSCULAR; INTRAVENOUS EVERY 6 HOURS PRN
Status: DISCONTINUED | OUTPATIENT
Start: 2025-08-15 | End: 2025-08-16 | Stop reason: HOSPADM

## 2025-08-15 RX ORDER — LIDOCAINE 40 MG/G
CREAM TOPICAL
Status: DISCONTINUED | OUTPATIENT
Start: 2025-08-15 | End: 2025-08-16 | Stop reason: HOSPADM

## 2025-08-15 RX ORDER — FERROUS SULFATE 325(65) MG
325 TABLET ORAL
COMMUNITY

## 2025-08-15 RX ORDER — FLUMAZENIL 0.1 MG/ML
0.2 INJECTION, SOLUTION INTRAVENOUS
Status: DISCONTINUED | OUTPATIENT
Start: 2025-08-15 | End: 2025-08-16 | Stop reason: HOSPADM

## 2025-08-18 ENCOUNTER — PATIENT OUTREACH (OUTPATIENT)
Dept: CARE COORDINATION | Facility: CLINIC | Age: 40
End: 2025-08-18
Payer: COMMERCIAL

## 2025-08-18 LAB
PATH REPORT.COMMENTS IMP SPEC: NORMAL
PATH REPORT.FINAL DX SPEC: NORMAL
PATH REPORT.GROSS SPEC: NORMAL
PATH REPORT.MICROSCOPIC SPEC OTHER STN: NORMAL
PATH REPORT.RELEVANT HX SPEC: NORMAL
PHOTO IMAGE: NORMAL

## 2025-08-20 ENCOUNTER — PATIENT OUTREACH (OUTPATIENT)
Dept: CARE COORDINATION | Facility: CLINIC | Age: 40
End: 2025-08-20
Payer: COMMERCIAL

## (undated) DEVICE — PREP CHLORAPREP 26ML TINTED ORANGE  260815

## (undated) DEVICE — ENDO FORCEP BX CAPTURA PRO SPIKE G50696

## (undated) DEVICE — SPECIMEN CONTAINER 3OZ W/FORMALIN 59901

## (undated) DEVICE — GOWN IMPERVIOUS 2XL BLUE

## (undated) DEVICE — TUBING SUCTION MEDI-VAC 1/4"X20' N620A

## (undated) DEVICE — SOL WATER IRRIG 1000ML BOTTLE 07139-09

## (undated) DEVICE — PAD CHUX UNDERPAD 23X24" 7136

## (undated) DEVICE — SOL WATER IRRIG 500ML BOTTLE 2F7113

## (undated) DEVICE — PAD CHUX UNDERPAD 30X36" P3036C

## (undated) DEVICE — KIT ENDO FIRST STEP DISINFECTANT 200ML W/POUCH EP-4

## (undated) DEVICE — KIT ENDO TURNOVER/PROCEDURE CARRY-ON 101822

## (undated) DEVICE — SUCTION MANIFOLD NEPTUNE 2 SYS 1 PORT 702-025-000

## (undated) RX ORDER — FENTANYL CITRATE 50 UG/ML
INJECTION, SOLUTION INTRAMUSCULAR; INTRAVENOUS
Status: DISPENSED
Start: 2019-09-03

## (undated) RX ORDER — SIMETHICONE 40MG/0.6ML
SUSPENSION, DROPS(FINAL DOSAGE FORM)(ML) ORAL
Status: DISPENSED
Start: 2019-09-03